# Patient Record
Sex: MALE | Race: WHITE | NOT HISPANIC OR LATINO | Employment: OTHER | URBAN - METROPOLITAN AREA
[De-identification: names, ages, dates, MRNs, and addresses within clinical notes are randomized per-mention and may not be internally consistent; named-entity substitution may affect disease eponyms.]

---

## 2020-02-14 ENCOUNTER — APPOINTMENT (OUTPATIENT)
Dept: RADIOLOGY | Facility: MEDICAL CENTER | Age: 70
End: 2020-02-14
Attending: INTERNAL MEDICINE
Payer: MEDICARE

## 2020-02-18 ENCOUNTER — HOSPITAL ENCOUNTER (OUTPATIENT)
Dept: RADIOLOGY | Facility: MEDICAL CENTER | Age: 70
End: 2020-02-18
Attending: INTERNAL MEDICINE
Payer: MEDICARE

## 2020-02-18 DIAGNOSIS — C20 RECTAL CANCER (HCC): ICD-10-CM

## 2020-02-18 PROCEDURE — A9576 INJ PROHANCE MULTIPACK: HCPCS | Performed by: INTERNAL MEDICINE

## 2020-02-18 PROCEDURE — 72197 MRI PELVIS W/O & W/DYE: CPT

## 2020-02-18 PROCEDURE — 700117 HCHG RX CONTRAST REV CODE 255: Performed by: INTERNAL MEDICINE

## 2020-02-18 PROCEDURE — 700111 HCHG RX REV CODE 636 W/ 250 OVERRIDE (IP): Mod: JG | Performed by: RADIOLOGY

## 2020-02-18 RX ORDER — MULTIVIT WITH MINERALS/LUTEIN
TABLET ORAL DAILY
COMMUNITY
End: 2020-12-11

## 2020-02-18 RX ORDER — LOSARTAN POTASSIUM 100 MG/1
100 TABLET ORAL EVERY EVENING
Status: ON HOLD | COMMUNITY
Start: 2020-02-09 | End: 2020-12-29

## 2020-02-18 RX ADMIN — GADOTERIDOL 20 ML: 279.3 INJECTION, SOLUTION INTRAVENOUS at 09:30

## 2020-02-18 RX ADMIN — GLUCAGON 1 MG: 1 INJECTION, POWDER, LYOPHILIZED, FOR SOLUTION INTRAMUSCULAR; INTRAVENOUS at 08:20

## 2020-02-18 NOTE — OR NURSING
"Preadmit appointment: \" Preparing for your Procedure information\" sheet reviewed with patient with verbal instructions. Patient instructed to continue prescribed medications through the day before surgery, instructed to take the following medications the day of surgery per anesthesia protocol: none.  Pt states he has not been given any instructions for prep or diet prior to procedure.  Pt has not eaten yet today because he just had an MRI, advised pt to be on clear liquids (like when he had his colonoscopy 2/4/20) until he hears from Dr. Gonsalez's office.  I informed pt I would call Dr. Gonsalez's office and have them call him with instructions.  Call placed to Dr. Gonsalez's office and spoke to Aylin procedure  for Dr. Gonsalez and informed her pt states he has not been instructed on diet or prep for tomorrow.  She said she will follow up and call patient  "

## 2020-02-19 ENCOUNTER — ANESTHESIA (OUTPATIENT)
Dept: SURGERY | Facility: MEDICAL CENTER | Age: 70
End: 2020-02-19
Payer: MEDICARE

## 2020-02-19 ENCOUNTER — ANESTHESIA EVENT (OUTPATIENT)
Dept: SURGERY | Facility: MEDICAL CENTER | Age: 70
End: 2020-02-19
Payer: MEDICARE

## 2020-02-19 ENCOUNTER — HOSPITAL ENCOUNTER (OUTPATIENT)
Facility: MEDICAL CENTER | Age: 70
End: 2020-02-19
Attending: INTERNAL MEDICINE | Admitting: INTERNAL MEDICINE
Payer: MEDICARE

## 2020-02-19 VITALS
BODY MASS INDEX: 28.44 KG/M2 | RESPIRATION RATE: 18 BRPM | TEMPERATURE: 97.3 F | WEIGHT: 192.02 LBS | OXYGEN SATURATION: 95 % | HEIGHT: 69 IN | DIASTOLIC BLOOD PRESSURE: 84 MMHG | SYSTOLIC BLOOD PRESSURE: 163 MMHG | HEART RATE: 65 BPM

## 2020-02-19 PROCEDURE — 160209 HCHG ENDO MINUTES - EA ADDL 1 MIN LEVEL 5: Performed by: INTERNAL MEDICINE

## 2020-02-19 PROCEDURE — 160035 HCHG PACU - 1ST 60 MINS PHASE I: Performed by: INTERNAL MEDICINE

## 2020-02-19 PROCEDURE — 160009 HCHG ANES TIME/MIN: Performed by: INTERNAL MEDICINE

## 2020-02-19 PROCEDURE — 160204 HCHG ENDO MINUTES - 1ST 30 MINS LEVEL 5: Performed by: INTERNAL MEDICINE

## 2020-02-19 PROCEDURE — 160046 HCHG PACU - 1ST 60 MINS PHASE II: Performed by: INTERNAL MEDICINE

## 2020-02-19 PROCEDURE — 160002 HCHG RECOVERY MINUTES (STAT): Performed by: INTERNAL MEDICINE

## 2020-02-19 PROCEDURE — 700101 HCHG RX REV CODE 250: Performed by: INTERNAL MEDICINE

## 2020-02-19 PROCEDURE — 700105 HCHG RX REV CODE 258: Performed by: INTERNAL MEDICINE

## 2020-02-19 PROCEDURE — 700111 HCHG RX REV CODE 636 W/ 250 OVERRIDE (IP): Performed by: ANESTHESIOLOGY

## 2020-02-19 PROCEDURE — 700101 HCHG RX REV CODE 250: Performed by: ANESTHESIOLOGY

## 2020-02-19 PROCEDURE — 160048 HCHG OR STATISTICAL LEVEL 1-5: Performed by: INTERNAL MEDICINE

## 2020-02-19 PROCEDURE — 160025 RECOVERY II MINUTES (STATS): Performed by: INTERNAL MEDICINE

## 2020-02-19 RX ORDER — HYDROMORPHONE HYDROCHLORIDE 1 MG/ML
0.1 INJECTION, SOLUTION INTRAMUSCULAR; INTRAVENOUS; SUBCUTANEOUS
Status: DISCONTINUED | OUTPATIENT
Start: 2020-02-19 | End: 2020-02-19 | Stop reason: HOSPADM

## 2020-02-19 RX ORDER — MEPERIDINE HYDROCHLORIDE 25 MG/ML
12.5 INJECTION INTRAMUSCULAR; INTRAVENOUS; SUBCUTANEOUS
Status: DISCONTINUED | OUTPATIENT
Start: 2020-02-19 | End: 2020-02-19 | Stop reason: HOSPADM

## 2020-02-19 RX ORDER — METOPROLOL TARTRATE 1 MG/ML
INJECTION, SOLUTION INTRAVENOUS PRN
Status: DISCONTINUED | OUTPATIENT
Start: 2020-02-19 | End: 2020-02-19 | Stop reason: SURG

## 2020-02-19 RX ORDER — MIDAZOLAM HYDROCHLORIDE 1 MG/ML
1 INJECTION INTRAMUSCULAR; INTRAVENOUS
Status: DISCONTINUED | OUTPATIENT
Start: 2020-02-19 | End: 2020-02-19 | Stop reason: HOSPADM

## 2020-02-19 RX ORDER — HYDROMORPHONE HYDROCHLORIDE 1 MG/ML
0.4 INJECTION, SOLUTION INTRAMUSCULAR; INTRAVENOUS; SUBCUTANEOUS
Status: DISCONTINUED | OUTPATIENT
Start: 2020-02-19 | End: 2020-02-19 | Stop reason: HOSPADM

## 2020-02-19 RX ORDER — ONDANSETRON 2 MG/ML
4 INJECTION INTRAMUSCULAR; INTRAVENOUS
Status: DISCONTINUED | OUTPATIENT
Start: 2020-02-19 | End: 2020-02-19 | Stop reason: HOSPADM

## 2020-02-19 RX ORDER — SODIUM CHLORIDE, SODIUM LACTATE, POTASSIUM CHLORIDE, CALCIUM CHLORIDE 600; 310; 30; 20 MG/100ML; MG/100ML; MG/100ML; MG/100ML
INJECTION, SOLUTION INTRAVENOUS CONTINUOUS
Status: DISCONTINUED | OUTPATIENT
Start: 2020-02-19 | End: 2020-02-19 | Stop reason: HOSPADM

## 2020-02-19 RX ORDER — OXYCODONE HCL 5 MG/5 ML
10 SOLUTION, ORAL ORAL
Status: DISCONTINUED | OUTPATIENT
Start: 2020-02-19 | End: 2020-02-19 | Stop reason: HOSPADM

## 2020-02-19 RX ORDER — HYDRALAZINE HYDROCHLORIDE 20 MG/ML
5 INJECTION INTRAMUSCULAR; INTRAVENOUS
Status: DISCONTINUED | OUTPATIENT
Start: 2020-02-19 | End: 2020-02-19 | Stop reason: HOSPADM

## 2020-02-19 RX ORDER — HYDROMORPHONE HYDROCHLORIDE 1 MG/ML
0.2 INJECTION, SOLUTION INTRAMUSCULAR; INTRAVENOUS; SUBCUTANEOUS
Status: DISCONTINUED | OUTPATIENT
Start: 2020-02-19 | End: 2020-02-19 | Stop reason: HOSPADM

## 2020-02-19 RX ORDER — DIPHENHYDRAMINE HYDROCHLORIDE 50 MG/ML
12.5 INJECTION INTRAMUSCULAR; INTRAVENOUS
Status: DISCONTINUED | OUTPATIENT
Start: 2020-02-19 | End: 2020-02-19 | Stop reason: HOSPADM

## 2020-02-19 RX ORDER — OXYCODONE HCL 5 MG/5 ML
5 SOLUTION, ORAL ORAL
Status: DISCONTINUED | OUTPATIENT
Start: 2020-02-19 | End: 2020-02-19 | Stop reason: HOSPADM

## 2020-02-19 RX ORDER — METOPROLOL TARTRATE 1 MG/ML
1 INJECTION, SOLUTION INTRAVENOUS
Status: DISCONTINUED | OUTPATIENT
Start: 2020-02-19 | End: 2020-02-19 | Stop reason: HOSPADM

## 2020-02-19 RX ORDER — HALOPERIDOL 5 MG/ML
1 INJECTION INTRAMUSCULAR
Status: DISCONTINUED | OUTPATIENT
Start: 2020-02-19 | End: 2020-02-19 | Stop reason: HOSPADM

## 2020-02-19 RX ADMIN — SODIUM CHLORIDE, POTASSIUM CHLORIDE, SODIUM LACTATE AND CALCIUM CHLORIDE 1000 ML: 600; 310; 30; 20 INJECTION, SOLUTION INTRAVENOUS at 14:28

## 2020-02-19 RX ADMIN — PROPOFOL 192 MCG/KG/MIN: 10 INJECTION, EMULSION INTRAVENOUS at 16:07

## 2020-02-19 RX ADMIN — METOPROLOL TARTRATE 2 MG: 5 INJECTION INTRAVENOUS at 16:12

## 2020-02-19 RX ADMIN — FENTANYL CITRATE 100 MCG: 50 INJECTION, SOLUTION INTRAMUSCULAR; INTRAVENOUS at 16:07

## 2020-02-19 RX ADMIN — METOPROLOL TARTRATE 1 MG: 5 INJECTION INTRAVENOUS at 16:25

## 2020-02-19 RX ADMIN — LIDOCAINE HYDROCHLORIDE 0.5 ML: 10 INJECTION, SOLUTION INFILTRATION; PERINEURAL at 14:28

## 2020-02-19 RX ADMIN — SODIUM CHLORIDE, POTASSIUM CHLORIDE, SODIUM LACTATE AND CALCIUM CHLORIDE: 600; 310; 30; 20 INJECTION, SOLUTION INTRAVENOUS at 15:19

## 2020-02-19 RX ADMIN — PROPOFOL 50 MG: 10 INJECTION, EMULSION INTRAVENOUS at 16:07

## 2020-02-19 RX ADMIN — MIDAZOLAM HYDROCHLORIDE 2 MG: 1 INJECTION, SOLUTION INTRAMUSCULAR; INTRAVENOUS at 16:07

## 2020-02-19 ASSESSMENT — PAIN SCALES - GENERAL: PAIN_LEVEL: 1

## 2020-02-19 NOTE — ANESTHESIA PREPROCEDURE EVALUATION
Relevant Problems   No relevant active problems       Physical Exam    Airway   Mallampati: II  TM distance: >3 FB  Neck ROM: full       Cardiovascular - normal exam  Rhythm: regular  Rate: normal  (-) murmur     Dental - normal exam         Pulmonary - normal exam  Breath sounds clear to auscultation     Abdominal    Neurological - normal exam                 Anesthesia Plan    ASA 2       Plan - general       Airway plan will be natural airway        Induction: intravenous    Postoperative Plan: Postoperative administration of opioids is intended.    Pertinent diagnostic labs and testing reviewed    Informed Consent:    Anesthetic plan and risks discussed with patient.

## 2020-02-19 NOTE — ANESTHESIA TIME REPORT
Anesthesia Start and Stop Event Times     Date Time Event    2/19/2020 1554 Ready for Procedure        Responsible Staff    No responsible staff documented.       Preop Diagnosis (Free Text):  Pre-op Diagnosis     MALIGNANT NEOPLASM OF RECTUM        Preop Diagnosis (Codes):    Post op Diagnosis  Malignant neoplasm of rectum      Premium Reason  Non-Premium    Comments:

## 2020-02-20 ENCOUNTER — APPOINTMENT (OUTPATIENT)
Dept: RADIOLOGY | Facility: MEDICAL CENTER | Age: 70
End: 2020-02-20
Attending: INTERNAL MEDICINE
Payer: MEDICARE

## 2020-02-20 NOTE — OP REPORT
Date of Procedure:  2/19/20   Referring Physicians:   1. Dr.sarmad ROMERO   2. Lulu Astudillo M.D.    Indication:   Rectal mass   ====================================  Procedure performed:  1.Flexible sigmoidoscopy with radial EUS evaluation    ====================================  Anesthesiologist: Giuseppe Paige M.D.  Surgeon: Carlyle Gonsalez M.D.  -----------------------------------------------------------------  Preoperative diagnosis:   1. Rectal mass     Postoperative diagnosis:  1. 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  Propofol sedation was administered under discretion of anesthesiology.  Please refer to their notes for more details.    Universal Precautions:  [place] Precautions were followed throughout the entire procedure.  The patient was continuously monitored on Blood pressure, pulse oximetry, and heart rate monitoring though out the procedure and afterwards.        A time out was taken prior to the start of the procedure to identify the patient's name, date of birth, ID badge, and appropriate procedure site/procedure type.    Informed Consent:  Informed consent was obtained from the patient after the risks, benefits, and indications were explained  to the patient in detail.  The alternatives to the procedure were explained as well.  Risks such as bleeding, infection, perforation, complications with sedation, and need for open surgery for complications were explained in detail prior to obtaining consent.  The patient was given opportunity to ask questions.  If need be these same risks, benefits, indication, and complications were explained to the patients family member prior to obtaining consent for the procedure.    Description of procedure:    Patient was brought to endoscopy operating room and met by staff and physicians.  After procedure was confirmed and appropriate procedure was identified preparations were made for sedation.  A time out was taken to identify the patients name, procedure site/type, and ID badge.  The patient was then sedated, and placed in the left lateral position.  When the patient was appropriately sedated the GIF Olympus colonoscope was used to perform the procedure.    The colonoscope was passed under direct visualization to the level of the cecum.  The cecum was identified by the ileocecal valve and the appendiceal orifice.  Photographs were obtained.        PREP QUALITY: Good     After examination of the rectum  the colonoscope was placed iat 30 cm from the anal verge ,and carefully withdrawn with attention directed to all folds, haustra, and mucosa.    FINDINGS:     The rectum was then evaluated in forward and retroflexion.      Rectum:  A carpeted polypoid lesion with a central ulceration extending from the 11- 16 cm from the anal verge                   The lesion involved 1/3rd of the circumference and is no obstructive      --The mucosa and evaluation was otherwise unremarkable aside from above.      After attempted retroflexion in the rectum, the insufflated air was remove from the colon.  The colonoscope was removed.  The Olympus colonoscope was taken for processing and cleaning    Patient remained in left lateral position  under sedation.  The Olympus UCT- radial echoendoscope was attached to the processor and prepped for endoscopic evaluation of the lesion.    ====Flexible sigmoidoscopy with Radial Endoscopic Ultrasound=======    -The Echoendoscope was advanced as proximal as possible up to 25-30cm max, and then carefully withdrawn with continuous ultrasound imaging.    Images were obtained beginning at the iliac vessels and then through to the anal sphincter.  He region of the lesion was carefully examined in order to   obtain definitve T-staging.  Additional attention was paid to the surrounding regions to evaluate for lymph nodes.    -Findings:     EUS revealed No enlarged perirectal Lymph nodes      EUS revealed Tstage to be T3      The tumor breaks in to perirectal fat         There was No  involvement of the anal sphincters or prostate

## 2020-02-20 NOTE — DISCHARGE INSTRUCTIONS
ENDOSCOPY HOME CARE INSTRUCTIONS      COLONOSCOPY OR FLEXIBLE SIGMOIDOSCOPY  1. If you received a barium enema, take a mild laxative such as dulcolax, Sushila's M.O., or Milk of Magnesia to clean out the barium.  2. Drink plenty of fluids. Eat a diet high in fiber (whole-grain breads, fresh fruit and vegetables).  3. You may notice a few drops of blood with your first bowel movement. If you develop any large amount of bleeding, black stools, a fever, or abdominal pain, call your doctor right away.  4. Don't drive or drink alcohol for 24 hours. The medication you received will make you too drowsy.  5. Additional instructions: Follow with Oncology for neoadjuvant chemoradiation     Dr. 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  If any questions arise, call your doctor. If your doctor is not available, please feel free to call [phone]. You can also call the HEALTH HOTLINE open 24 hours/day, 7 days/week and speak to a nurse at [phone], or toll free [phone].    Depression / Suicide Risk    As you are discharged from this [facility] facility, it is important to learn how to keep safe from harming yourself.    Recognize the warning signs:  · Abrupt changes in personality, positive or negative- including increase in energy   · Giving away possessions  · Change in eating patterns- significant weight changes-  positive or negative  · Change in sleeping  patterns- unable to sleep or sleeping all the time   · Unwillingness or inability to communicate  · Depression  · Unusual sadness, discouragement and loneliness  · Talk of wanting to die  · Neglect of personal appearance   · Rebelliousness- reckless behavior  · Withdrawal from people/activities they love  · Confusion- inability to concentrate     If you or a loved one observes any of these behaviors or has concerns about self-harm, here's what you can do:  · Talk about it- your feelings and reasons for harming yourself  · Remove any means that you might use to hurt yourself (examples: pills, rope, extension cords, firearm)  · Get professional help from the community (Mental Health, Substance Abuse, psychological counseling)  · Do not be alone:Call your Safe Contact- someone whom you trust who will be there for you.  · Call your local CRISIS HOTLINE 963-8310 or 999-680-8563  · Call your local Children's Mobile Crisis Response Team [place] (231) 970-3311 or www.[employer]  · Call the toll free National Suicide Prevention Hotlines   · National Suicide Prevention Lifeline 986-412-UNKC (1552)  · National Hope Line Network 800-SUICIDE (927-6294)    I acknowledge receipt and understanding of these Home Care Instructions.

## 2020-02-20 NOTE — ANESTHESIA QCDR
2019 [facility] Clinical Data Registry (for Quality Improvement)     Postoperative nausea/vomiting risk protocol (Adult = 18 yrs and Pediatric 3-17 yrs)- (430 and 463)  General inhalation anesthetic (NOT TIVA) with PONV risk factors: No  Provision of anti-emetic therapy with at least 2 different classes of agents: N/A  Patient DID NOT receive anti-emetic therapy and reason is documented in Medical Record: N/A    Multimodal Pain Management- (477)  Non-emergent surgery AND patient age >= 18: Yes  Use of Multimodal Pain Management, two or more drugs and/or interventions, NOT including systemic opioids: No  Exception: Documented allergy to multiple classes of analgesics: No       Smoking Abstinence (404)  Patient is current smoker (cigarette, pipe, e-cig, marijuanna): No  Elective Surgery:   Abstinence instructions provided prior to day of surgery:   Patient abstained from smoking on day of surgery:     Pre-Op Beta-Blocker in Isolated CABG (44)  Isolated CABG AND patient age >= 18: No  Beta-blocker admin within 24 hours of surgical incision:   Exception:of medical reason(s) for not administering beta blocker within 24 hours prior to surgical incision (e.g., not  indicated,other medical reason):     PACU assessment of acute postoperative pain prior to Anesthesia Care End- Applies to Patients Age = 18- (ABG7)  Initial PACU pain score is which of the following: < 7/10  Patient unable to report pain score: N/A    Post-anesthetic transfer of care checklist/protocol to PACU/ICU- (426 and 427)  Upon conclusion of case, patient transferred to which of the following locations: PACU/Non-ICU  Use of transfer checklist/protocol: Yes  Exclusion: Service Performed in Patient Hospital Room (and thus did not require transfer): N/A  Unplanned admission to ICU related to anesthesia service up through end of PACU care- (MD51)  Unplanned admission to ICU (not initially anticipated at anesthesia start time): No

## 2020-02-20 NOTE — OR NURSING
1637: Pt arrived to PACU s/p colonoscopy performed by Dr. Gonsalez in left lateral position in stretcher. Nasal airway in oral cavity. Pt aroused to verbal stimulation. Nasal airway removed and 3L NC in place. Sinus arrhythmia with occasional PVCs.  Dr. Paige aware of rhythm. No action requested at this time. Abdomen soft, non-tender. Pt oriented to self. Denies pain or nausea.     1647: Pt transferred to supine position independently. HOB increased to 30 degrees. Pt AOx4. Room air. Continues to deny pain or nausea. Pt declines sips of water at this time.     1655: Pt's wife Kaleb called and updated on pt's status. Pt resting comfortably in stretcher at this time.   1701: Pt tolerating cranberry juice without nausea.   1710: Pt denies pain or nausea. Abdomen remains soft. Tolerating PO. Pt meets phase I d/c criteria.   1715: Gave report to DEYSI Troy  1718: Dr. Gonsalez at bedside explaining procedure to pt.

## 2020-02-20 NOTE — ANESTHESIA POSTPROCEDURE EVALUATION
Patient: Casey Arroyo    Procedure Summary     Date:  02/19/20 Room / Location:   ENDOSCOPIC ULTRASOUND ROOM / SURGERY [facility]    Anesthesia Start:  1604 Anesthesia Stop:  1639    Procedures:       COLONOSCOPY      COLONOSCOPY, FLEXIBLE, WITH ENDOSCOPIC US-LOWER RADIAL Diagnosis:       Malignant neoplasm of rectum      (MALIGNANT NEOPLASM OF RECTUM)    Surgeon:  Carlyle Gonsalez M.D. Responsible Provider:  Giuseppe Paige M.D.    Anesthesia Type:  general ASA Status:  2          Final Anesthesia Type: general  Last vitals  BP   Blood Pressure : 154/78    Temp   36.4 °C (97.5 °F)    Pulse   Pulse: 65   Resp   18    SpO2   96 %      Anesthesia Post Evaluation    Patient location during evaluation: PACU  Patient participation: complete - patient participated  Level of consciousness: awake and alert  Pain score: 1    Airway patency: patent  Anesthetic complications: no  Cardiovascular status: hemodynamically stable  Respiratory status: acceptable  Hydration status: euvolemic    PONV: none           Nurse Pain Score: 0 (NPRS)

## 2020-09-16 ENCOUNTER — HOSPITAL ENCOUNTER (OUTPATIENT)
Dept: RADIOLOGY | Facility: MEDICAL CENTER | Age: 70
End: 2020-09-16
Attending: SURGERY
Payer: MEDICARE

## 2020-09-16 DIAGNOSIS — C20 RECTAL CANCER (HCC): ICD-10-CM

## 2020-09-16 PROCEDURE — A9576 INJ PROHANCE MULTIPACK: HCPCS | Performed by: SURGERY

## 2020-09-16 PROCEDURE — 72197 MRI PELVIS W/O & W/DYE: CPT

## 2020-09-16 PROCEDURE — 700111 HCHG RX REV CODE 636 W/ 250 OVERRIDE (IP): Mod: JG | Performed by: RADIOLOGY

## 2020-09-16 PROCEDURE — 700117 HCHG RX CONTRAST REV CODE 255: Performed by: SURGERY

## 2020-09-16 RX ORDER — HEPARIN SODIUM (PORCINE) LOCK FLUSH IV SOLN 100 UNIT/ML 100 UNIT/ML
300-500 SOLUTION INTRAVENOUS PRN
Status: DISCONTINUED | OUTPATIENT
Start: 2020-09-16 | End: 2020-09-17 | Stop reason: HOSPADM

## 2020-09-16 RX ADMIN — HEPARIN 500 UNITS: 100 SYRINGE at 16:00

## 2020-09-16 RX ADMIN — GADOTERIDOL 20 ML: 279.3 INJECTION, SOLUTION INTRAVENOUS at 16:43

## 2020-09-16 RX ADMIN — GLUCAGON 1 MG: 1 INJECTION, POWDER, LYOPHILIZED, FOR SOLUTION INTRAMUSCULAR; INTRAVENOUS at 14:50

## 2020-09-16 NOTE — PROGRESS NOTES
Port located right chest. Site clean, dry, non-tender. Port accessed using sterile technique, flushes easily with good blood return. After MRI, port flushed with 20 ml N.S. and heparin and de-accessed. No bleeding noted, band-aid applied.

## 2020-10-19 ENCOUNTER — APPOINTMENT (OUTPATIENT)
Dept: WOUND CARE | Facility: MEDICAL CENTER | Age: 70
End: 2020-10-19
Attending: SURGERY
Payer: MEDICARE

## 2020-10-20 ENCOUNTER — NON-PROVIDER VISIT (OUTPATIENT)
Dept: WOUND CARE | Facility: MEDICAL CENTER | Age: 70
End: 2020-10-20
Attending: SURGERY
Payer: MEDICARE

## 2020-10-20 PROCEDURE — 99211 OFF/OP EST MAY X REQ PHY/QHP: CPT

## 2020-10-20 NOTE — CERTIFICATION
"[facility]  [address], [address]  [place]  [address]  [phone] Fax#    Pre-surgical Ostomy Marking    Patient Name:  Casey Arroyo  Date of Marking:10/20/20    Date of Surgery:10/21/20  Surgeon:Dr. Aquino  Type of Surgery: Robotic assisted LAR with diverting ileostomy  Diagnosis:Rectal ca      SUBJECTIVE: \"The surgeon said I can get this reversed in about a month\"  HPI:Pt is a 69 year old gentleman with a hx of rectal ca, underwent chemo and radiation, referred by surgeon for pre-surgical stoma marking, undergoing LAR with diverting ileostomy at [facility] on 10/21/2020.       Medications: (.med)     Allergies:  Patient has no known allergies.      OBJECTIVE   Assist patient to identify potential stoma site within his/her line of site on a flat pouching surface, within the rectus muscle, away from belt line, bony prominences, existing scars and umbilicus.        Patient teaching:    __x__ Reviewed basic anatomy and function of the GI and/or  tract  __x__ Reviewed nature of surgical procedure.  __x__ Patient to view Ostomy videos on YouTube at home.  __x__ Pouching system demonstrated.      Reviewed/Provided patient with literature pertaining to:    ____ Colostomy   __x__ Ileostomy   ____ Urostomy   ____ Other__________________________________________________________.    Check marks indicated completion of the following:  __x__ Procedure explained to patient.  __x__ Rectus muscle identified with patient in supine position.  __x__ Appropriate abdominal quadrant for planned surgical procedure identified.  __x__ Existing scars identified.  ___x_ Potential sites evaluated with patient:  __x__ Sitting.  __x__ Bending forward/twisting at waist.  __x__ Site(s) selected with respect to skin folds, creases, abdominal contours & belt line.     Special considerations:   ____ Wheel chair bound  ____ Child  ____ Continent procedure  ____ Patient with multiple stomas  __x__ " "Ambulatory    Potential site (s) marked with an \"X\":   Skin prepped with skin protectant wipe.   Catarino applied with indelible marker.    Covered with a Tegaderm and picture framed with tape.            Abdomen Quadrant Marked:  _x___ URQ  ____ ULQ    ____ LRQ  ____ LLQ    Other: ___________________________________________________________________    ___________________________________________________________________      [facility] Team notified (ext. [phone]) :   N/A - surgery is at [facility]______       ASSESSMENT/PLAN  -Inpatient ostomy nurses to see patient while hospitalized.   -Patient to return to [facility] to see ostomy RN post-discharge        Clinician Signature:  _________________________________ Date:  __________    Physician Signature:  ________________________________ Date:  __________  "

## 2020-10-21 NOTE — PATIENT INSTRUCTIONS
Ostomy marking appointment today.   Change urostomies and ileostomies every 3-5 days. Change colostomies every 5-7 days. Change appliance immediately if it is leaking or peristomal skin feels irritated, has itching, or  burning.   To change the appliance, remove previous appliance, cleanse peristomal skin with warm water/washcloth, pat dry, make an ostomy template or use cardboard measuring guide and trace ostomy shape onto back of barrier, cut out barrier, apply a paste ring around barrier opening and apply appliance. Empty pouches when no more than ½ full. Check contents every 2 hours or as needed. Do not leave soap residue on tissue and do not use baby wipes or skin prep wipes.     Should you experience any significant changes in your condition, such as infection (redness, swelling, localized heat, increased pain, fever > 101 F, chills) or have any questions regarding your home care instructions, please contact the wound center at [phone]. If after hours, contact your primary care physician or go to the hospital emergency room.

## 2020-11-03 ENCOUNTER — NON-PROVIDER VISIT (OUTPATIENT)
Dept: WOUND CARE | Facility: MEDICAL CENTER | Age: 70
End: 2020-11-03
Attending: SURGERY
Payer: MEDICARE

## 2020-11-03 PROCEDURE — 99211 OFF/OP EST MAY X REQ PHY/QHP: CPT

## 2020-11-04 NOTE — PATIENT INSTRUCTIONS
Change ileostomies every 3-5 days.  Change appliance immediately if it is leaking or peristomal skin feels irritated, has itching, or  burning.   To change the appliance, remove previous appliance, cleanse peristomal skin with warm water/washcloth, pat dry, make an ostomy template or use cardboard measuring guide and trace ostomy shape onto back of barrier, cut out barrier, apply a paste ring around barrier opening and apply appliance. Empty pouches when no more than ½ full. Check contents every 2 hours or as needed. Do not leave soap residue on tissue and do not use baby wipes or skin prep wipes.     Should you experience any significant changes in your condition, such as infection (redness, swelling, localized heat, increased pain, fever > 101 F, chills) or have any questions regarding your home care instructions, please contact the wound center at [phone]. If after hours, contact your primary care physician or go to the hospital emergency room.

## 2020-11-10 ENCOUNTER — NON-PROVIDER VISIT (OUTPATIENT)
Dept: WOUND CARE | Facility: MEDICAL CENTER | Age: 70
End: 2020-11-10
Attending: SURGERY
Payer: MEDICARE

## 2020-11-10 PROCEDURE — 99211 OFF/OP EST MAY X REQ PHY/QHP: CPT

## 2020-11-11 NOTE — PATIENT INSTRUCTIONS
Change urostomies and ileostomies every 3-5 days. Change colostomies every 5-7 days. Change appliance immediately if it is leaking or peristomal skin feels irritated, has itching, or  burning.   To change the appliance, remove previous appliance, cleanse peristomal skin with warm water/washcloth, pat dry, make an ostomy template or use cardboard measuring guide and trace ostomy shape onto back of barrier, cut out barrier, apply a paste ring around barrier opening and apply appliance. Empty pouches when no more than ½ full. Check contents every 2 hours or as needed. Do not leave soap residue on tissue and do not use baby wipes or skin prep wipes.     Should you experience any significant changes in your condition, such as infection (redness, swelling, localized heat, increased pain, fever > 101 F, chills) or have any questions regarding your home care instructions, please contact the wound center at [phone]. If after hours, contact your primary care physician or go to the hospital emergency room.

## 2020-11-11 NOTE — NON-PROVIDER
"Advanced Wound Care  [facility]  [address]  [place] [address]  [phone] Fax#    Ostomy Evaluation  For 90 Day Certification Period:  11/03/2020 - 02/03/2021    Referring Provider:  Dr. Miles Peña MD  Primary Provider: Isidro Maldonado MD  Consulting Providers:  Surgeon: Dr. Miles Peña MD      Start of Care: 11/03/2020  Reason for referral: Post surgical f/u for teaching, support, supply/product selection      SUBJECTIVE:  \"We changed it over the weekend together\".    HPI: Pt is a 69 year old gentleman with a hx of rectal ca, underwent chemo and radiation, referred by surgeon s/p LAR with diverting ileostomy at [facility] on 10/21/2020.     Past Medical Hx:  Past Medical History:   Diagnosis Date   • Cancer (HCC) 02/04/2020    rectal cancer   • Hypertension      Surgical Hx:   Past Surgical History:   Procedure Laterality Date   • PB COLONOSCOPY,DIAGNOSTIC  2/19/2020    Procedure: COLONOSCOPY;  Surgeon: Carlyle Gonsalez M.D.;  Location: SURGERY [facility];  Service: Gastroenterology   • COLONOSCOPY FLEX W/ENDO US  2/19/2020    Procedure: COLONOSCOPY, FLEXIBLE, WITH ENDOSCOPIC US-LOWER RADIAL;  Surgeon: Carlyle Gonsalez M.D.;  Location: SURGERY [facility];  Service: Gastroenterology   • COLONOSCOPY  02/04/2020   • APPENDECTOMY  1966     Medications:   Current Outpatient Medications:   •  losartan (COZAAR) 100 MG Tab, Take  by mouth every evening. Take 1 tablet by mouth every day, Disp: , Rfl:   •  Ascorbic Acid (VITAMIN C) 1000 MG Tab, Take  by mouth every day., Disp: , Rfl:   Allergies: Patient has no known allergies.    Social Hx:  Social History     Socioeconomic History   • Marital status:      Spouse name: Not on file   • Number of children: Not on file   • Years of education: Not on file   • Highest education level: Not on file   Occupational History   • Not on file   Social Needs   • Financial resource strain: Not on file   • Food " "insecurity     Worry: Not on file     Inability: Not on file   • Transportation needs     Medical: Not on file     Non-medical: Not on file   Tobacco Use   • Smoking status: Never Smoker   • Smokeless tobacco: Never Used   Substance and Sexual Activity   • Alcohol use: Yes     Comment: 8 shots per day   • Drug use: Never   • Sexual activity: Not on file   Lifestyle   • Physical activity     Days per week: Not on file     Minutes per session: Not on file   • Stress: Not on file   Relationships   • Social connections     Talks on phone: Not on file     Gets together: Not on file     Attends [employer] service: Not on file     Active member of club or organization: Not on file     Attends meetings of clubs or organizations: Not on file     Relationship status: Not on file   • Intimate partner violence     Fear of current or ex partner: Not on file     Emotionally abused: Not on file     Physically abused: Not on file     Forced sexual activity: Not on file   Other Topics Concern   • Not on file   Social History Narrative   • Not on file         OBJECTIVE:     STOMA ASSESSMENT:   Type:  __x   loop__Ileostomy     ____Colostomy    ____Urostomy    ____Other     Location:  RUQ    Size:1 3/4\"    Shape:oval   Color:red, moist   Protrudes:      ___ >1 inch               __x_ <1 inch   [place]:  Patent, superior os   Mucocutaneous Junction:  separation at 6-10 o'clock   Skin indentations, creases or scar tissue:none noted   Effie-stomal Skin Problems: resolved with scar tissue    Effluent / Flatus: soft brown   Photo  __x__ Yes ____ No            Pouching Procedure:   Old appliance removed using [place] medical adhesive spray.    Peristomal skin cleansed with: warm water on a washcloth   Peristomal skin treated with: stoma powder to separation sealed with no sting skin prep   Ostomy appliance used: [place] flat 70mm CTF with a 2\" paste ring and corresponding 70mm pouch       Patient Education: Change urostomies and ileostomies " every 3-5 days. Change colostomies every 5-7 days. Change appliance immediately if it is leaking or peristomal skin feels irritated, has itching, or  burning.   To change the appliance, remove previous appliance, cleanse peristomal skin with warm water/washcloth, pat dry, make an ostomy template or use cardboard measuring guide and trace ostomy shape onto back of barrier, cut out barrier, apply a paste ring around barrier opening and apply appliance. Empty pouches when no more than ½ full. Check contents every 2 hours or as needed. Do not leave soap residue on tissue and do not use baby wipes or skin prep wipes.     Should you experience any significant changes in your condition, such as infection (redness, swelling, localized heat, increased pain, fever > 101 F, chills) or have any questions regarding your home care instructions, please contact the wound center at [phone]. If after hours, contact your primary care physician or go to the hospital emergency room.       Verbal/demonstration instructions of above pouching procedure provided to patient/family.      Response: pt and spouse with good comprehension of instr today      PLAN: see pt in 1 week, he and spouse will change appliance in the meantime.     Supplies Needed:   Appliance type:    Arsenio - as above             Other:      Accessories:         Supplier:  [employer]    Frequency:  1 x week       Professional Collaboration:  Placed order with [employer]      At the time of each visit, a thorough assessment of the patient is completed to assure appropriateness of our plan of care.  The plan of care may need to be adapted from the original plan of care to address any significant changes in patient status.    Clinician Signature:  _________________________________  Date:  ____________    Physician Signature:  ________________________________  Date:  ____________

## 2020-11-16 ENCOUNTER — NON-PROVIDER VISIT (OUTPATIENT)
Dept: WOUND CARE | Facility: MEDICAL CENTER | Age: 70
End: 2020-11-16
Attending: SURGERY
Payer: MEDICARE

## 2020-11-16 PROCEDURE — 99211 OFF/OP EST MAY X REQ PHY/QHP: CPT

## 2020-11-16 NOTE — PATIENT INSTRUCTIONS
Change urostomies and ileostomies every 3-5 days. Change colostomies every 5-7 days. Change appliance immediately if it is leaking or peristomal skin feels irritated, has itching, or  burning.   To change the appliance, remove previous appliance, cleanse peristomal skin with warm water/washcloth, pat dry, make an ostomy template or use cardboard measuring guide and trace ostomy shape onto back of barrier, cut out barrier, apply a paste ring around barrier opening and apply appliance. Empty pouches when no more than ½ full. Check contents every 2 hours or as needed. Do not leave soap residue on tissue and do not use baby wipes or skin prep wipes.     Should you experience any significant changes in your condition, such as infection (redness, swelling, localized heat, increased pain, fever > 101 F, chills) or have any questions regarding your home care instructions, please contact the wound center at [phone]. If after hours, contact your primary care physician or go to the hospital emergency room.

## 2020-11-16 NOTE — NON-PROVIDER
"Advanced Wound Care  [facility]  [address]  [place] [address]  [phone] Fax#    Ostomy Evaluation  For 90 Day Certification Period:  11/03/2020 - 02/03/2021    Referring Provider:  Dr. Miles Peña MD  Primary Provider: Isidro Maldonado MD  Consulting Providers:  Surgeon: Dr. Miles Peña MD      Start of Care: 11/03/2020  Reason for referral: Post surgical f/u for teaching, support, supply/product selection      SUBJECTIVE:  \"Its' the same one you put on\".    HPI: Pt is a 70 year old gentleman with a hx of rectal ca, underwent chemo and radiation, referred by surgeon s/p LAR with diverting ileostomy at [facility] on 10/21/2020.     Past Medical Hx:  Past Medical History:   Diagnosis Date   • Cancer (HCC) 02/04/2020    rectal cancer   • Hypertension      Surgical Hx:   Past Surgical History:   Procedure Laterality Date   • PB COLONOSCOPY,DIAGNOSTIC  2/19/2020    Procedure: COLONOSCOPY;  Surgeon: Carlyle Gonsalez M.D.;  Location: SURGERY [facility];  Service: Gastroenterology   • COLONOSCOPY FLEX W/ENDO US  2/19/2020    Procedure: COLONOSCOPY, FLEXIBLE, WITH ENDOSCOPIC US-LOWER RADIAL;  Surgeon: Carlyle Gonsalez M.D.;  Location: SURGERY [facility];  Service: Gastroenterology   • COLONOSCOPY  02/04/2020   • APPENDECTOMY  1966     Medications:   Current Outpatient Medications:   •  losartan (COZAAR) 100 MG Tab, Take  by mouth every evening. Take 1 tablet by mouth every day, Disp: , Rfl:   •  Ascorbic Acid (VITAMIN C) 1000 MG Tab, Take  by mouth every day., Disp: , Rfl:   Allergies: Patient has no known allergies.    Social Hx:  Social History     Socioeconomic History   • Marital status:      Spouse name: Not on file   • Number of children: Not on file   • Years of education: Not on file   • Highest education level: Not on file   Occupational History   • Not on file   Social Needs   • Financial resource strain: Not on file   • Food insecurity     " "Worry: Not on file     Inability: Not on file   • Transportation needs     Medical: Not on file     Non-medical: Not on file   Tobacco Use   • Smoking status: Never Smoker   • Smokeless tobacco: Never Used   Substance and Sexual Activity   • Alcohol use: Yes     Comment: 8 shots per day   • Drug use: Never   • Sexual activity: Not on file   Lifestyle   • Physical activity     Days per week: Not on file     Minutes per session: Not on file   • Stress: Not on file   Relationships   • Social connections     Talks on phone: Not on file     Gets together: Not on file     Attends Sikhism service: Not on file     Active member of club or organization: Not on file     Attends meetings of clubs or organizations: Not on file     Relationship status: Not on file   • Intimate partner violence     Fear of current or ex partner: Not on file     Emotionally abused: Not on file     Physically abused: Not on file     Forced sexual activity: Not on file   Other Topics Concern   • Not on file   Social History Narrative   • Not on file         OBJECTIVE:     STOMA ASSESSMENT:   Type:  __x   loop__Ileostomy     ____Colostomy    ____Urostomy    ____Other     Location:  RUQ    Size:1 3/4\"    Shape:oval   Color:red, moist   Protrudes:      ___ >1 inch               __x_ <1 inch   [place]:  Patent, superior os   Mucocutaneous Junction:  separation at 6-10 o'clock   Skin indentations, creases or scar tissue:none noted   Effie-stomal Skin Problems: resolved with scar tissue; small crust post possible blister lateral & inferior   Effluent / Flatus: soft brown   Photo  __x__ Yes ____ No            Pouching Procedure:   Old appliance removed using [place] medical adhesive spray.    Peristomal skin cleansed with: warm water on a washcloth   Peristomal skin treated with: stoma powder to separation sealed with no sting skin prep   Ostomy appliance used: [place] flat 70mm CTF with a 2\" paste ring and corresponding 70mm pouch, caulked with stoma " paste      Patient Education: Change urostomies and ileostomies every 3-5 days. Change colostomies every 5-7 days. Change appliance immediately if it is leaking or peristomal skin feels irritated, has itching, or  burning.   To change the appliance, remove previous appliance, cleanse peristomal skin with warm water/washcloth, pat dry, make an ostomy template or use cardboard measuring guide and trace ostomy shape onto back of barrier, cut out barrier, apply a paste ring around barrier opening and apply appliance. Empty pouches when no more than ½ full. Check contents every 2 hours or as needed. Do not leave soap residue on tissue and do not use baby wipes or skin prep wipes.     Should you experience any significant changes in your condition, such as infection (redness, swelling, localized heat, increased pain, fever > 101 F, chills) or have any questions regarding your home care instructions, please contact the wound center at [phone]. If after hours, contact your primary care physician or go to the hospital emergency room.       Verbal/demonstration instructions of above pouching procedure provided to patient/family.      Response: pt and spouse with good comprehension of instr today      PLAN: see pt in 1 week, he and spouse will change appliance in the meantime.     Supplies Needed:   Appliance type:    Arsenio - as above             Other:      Accessories:         Supplier:  [employer]    Frequency:  1 x week       Professional Collaboration:  order with [employer] received      At the time of each visit, a thorough assessment of the patient is completed to assure appropriateness of our plan of care.  The plan of care may need to be adapted from the original plan of care to address any significant changes in patient status.    Clinician Signature:  _________________________________  Date:  ____________    Physician Signature:  ________________________________  Date:  ____________

## 2020-11-24 ENCOUNTER — NON-PROVIDER VISIT (OUTPATIENT)
Dept: WOUND CARE | Facility: MEDICAL CENTER | Age: 70
End: 2020-11-24
Attending: SURGERY
Payer: MEDICARE

## 2020-11-24 PROCEDURE — 99211 OFF/OP EST MAY X REQ PHY/QHP: CPT

## 2020-11-24 NOTE — NON-PROVIDER
"Advanced Wound Care  [facility]  [address]  [place] [address]  [phone] Fax#    Ostomy Evaluation  For 90 Day Certification Period:  11/03/2020 - 02/03/2021    Referring Provider:  Dr. Milse Peña MD  Primary Provider: Isidro Maldonado MD  Consulting Providers:  Surgeon: Dr. Miles Peña MD      Start of Care: 11/03/2020  Reason for referral: Post surgical f/u for teaching, support, supply/product selection      SUBJECTIVE:  \"We put it on on Saturday and it hasn't leaked (Day 4)\".    HPI: Pt is a 70 year old gentleman with a hx of rectal ca, underwent chemo and radiation, referred by surgeon s/p LAR with diverting ileostomy at [facility] on 10/21/2020.     Past Medical Hx:  Past Medical History:   Diagnosis Date   • Cancer (HCC) 02/04/2020    rectal cancer   • Hypertension      Surgical Hx:   Past Surgical History:   Procedure Laterality Date   • PB COLONOSCOPY,DIAGNOSTIC  2/19/2020    Procedure: COLONOSCOPY;  Surgeon: Carlyle Gonsalez M.D.;  Location: SURGERY [facility];  Service: Gastroenterology   • COLONOSCOPY FLEX W/ENDO US  2/19/2020    Procedure: COLONOSCOPY, FLEXIBLE, WITH ENDOSCOPIC US-LOWER RADIAL;  Surgeon: Carlyle Gonsalez M.D.;  Location: SURGERY [facility];  Service: Gastroenterology   • COLONOSCOPY  02/04/2020   • APPENDECTOMY  1966     Medications:   Current Outpatient Medications:   •  losartan (COZAAR) 100 MG Tab, Take  by mouth every evening. Take 1 tablet by mouth every day, Disp: , Rfl:   •  Ascorbic Acid (VITAMIN C) 1000 MG Tab, Take  by mouth every day., Disp: , Rfl:   Allergies: Patient has no known allergies.    Social Hx:  Social History     Socioeconomic History   • Marital status:      Spouse name: Not on file   • Number of children: Not on file   • Years of education: Not on file   • Highest education level: Not on file   Occupational History   • Not on file   Social Needs   • Financial resource strain: Not on file   • " "Food insecurity     Worry: Not on file     Inability: Not on file   • Transportation needs     Medical: Not on file     Non-medical: Not on file   Tobacco Use   • Smoking status: Never Smoker   • Smokeless tobacco: Never Used   Substance and Sexual Activity   • Alcohol use: Yes     Comment: 8 shots per day   • Drug use: Never   • Sexual activity: Not on file   Lifestyle   • Physical activity     Days per week: Not on file     Minutes per session: Not on file   • Stress: Not on file   Relationships   • Social connections     Talks on phone: Not on file     Gets together: Not on file     Attends [employer] service: Not on file     Active member of club or organization: Not on file     Attends meetings of clubs or organizations: Not on file     Relationship status: Not on file   • Intimate partner violence     Fear of current or ex partner: Not on file     Emotionally abused: Not on file     Physically abused: Not on file     Forced sexual activity: Not on file   Other Topics Concern   • Not on file   Social History Narrative   • Not on file         OBJECTIVE:     STOMA ASSESSMENT:   Type:  __x   loop__Ileostomy     ____Colostomy    ____Urostomy    ____Other     Location:  RUQ    Size:1 3/4\"    Shape:oval   Color:red, moist   Protrudes:      ___ >1 inch               __x_ <1 inch   [place]:  Patent, superior os   Mucocutaneous Junction:  separation at 6-10 o'clock has granulated in to surface   Skin indentations, creases or scar tissue: none noted   Effie-stomal Skin Problems: new small intact blister just superior to naval, and dermatitis to outer areas, apparently contact dermatitis from tape border on ostomy flange.   Effluent / Flatus: soft brown   Photo  __x__ Yes ____ No                Pouching Procedure:   Old appliance removed using [place] medical adhesive spray.    Peristomal skin cleansed with: warm water on a washcloth   Peristomal skin treated with: stoma powder/ no sting skin prep crusting to MCJ open areas. " "Brava strips to dermatitis/blistered areas before placing flange   Ostomy appliance used: [place] flat 70mm CTF with a 2\" paste ring and corresponding 70mm pouch      Patient Education: Change urostomies and ileostomies every 3-5 days. Change colostomies every 5-7 days. Change appliance immediately if it is leaking or peristomal skin feels irritated, has itching, or  burning.   To change the appliance, remove previous appliance, cleanse peristomal skin with warm water/washcloth, pat dry, make an ostomy template or use cardboard measuring guide and trace ostomy shape onto back of barrier, cut out barrier, apply a paste ring around barrier opening and apply appliance. Empty pouches when no more than ½ full. Check contents every 2 hours or as needed. Do not leave soap residue on tissue and do not use baby wipes or skin prep wipes.     Should you experience any significant changes in your condition, such as infection (redness, swelling, localized heat, increased pain, fever > 101 F, chills) or have any questions regarding your home care instructions, please contact the wound center at [phone]. If after hours, contact your primary care physician or go to the hospital emergency room.       Verbal/demonstration instructions of above pouching procedure provided to patient/family.      Response: pt and spouse with good comprehension of instr today      PLAN: see pt in 1 week, he and spouse will change appliance in the meantime.     Supplies Needed:   Appliance type:    [place] - as above             Other:      Accessories:         Supplier:  [employer]    Frequency:  1 x week       Professional Collaboration:  None. Pt had questions about COVID-19 vaccine - I recommended he make an appointment with his PCP for this      At the time of each visit, a thorough assessment of the patient is completed to assure appropriateness of our plan of care.  The plan of care may need to be adapted from the original plan of care to " address any significant changes in patient status.    Clinician Signature:  _________________________________  Date:  ____________    Physician Signature:  ________________________________  Date:  ____________

## 2020-11-24 NOTE — PATIENT INSTRUCTIONS
Change urostomies and ileostomies every 3-5 days. Change colostomies every 5-7 days. Change appliance immediately if it is leaking or peristomal skin feels irritated, has itching, or  burning.   To change the appliance, remove previous appliance, cleanse peristomal skin with warm water/washcloth, pat dry, make an ostomy template or use cardboard measuring guide and trace ostomy shape onto back of barrier, cut out barrier, apply a paste ring around barrier opening and apply appliance. Empty pouches when no more than ½ full. Check contents every 2 hours or as needed. Do not leave soap residue on tissue and do not use baby wipes or skin prep wipes.     Should you experience any significant changes in your condition, such as infection (redness, swelling, localized heat, increased pain, fever > 101 F, chills) or have any questions regarding your home care instructions, please contact the wound center at [phone]. If after hours, contact your primary care physician or go to the hospital emergency room.

## 2020-12-01 ENCOUNTER — NON-PROVIDER VISIT (OUTPATIENT)
Dept: WOUND CARE | Facility: MEDICAL CENTER | Age: 70
End: 2020-12-01
Attending: SURGERY
Payer: MEDICARE

## 2020-12-01 PROCEDURE — 99211 OFF/OP EST MAY X REQ PHY/QHP: CPT

## 2020-12-01 NOTE — PATIENT INSTRUCTIONS
Change urostomies and ileostomies every 3-5 days. Change colostomies every 5-7 days. Change appliance immediately if it is leaking or peristomal skin feels irritated, has itching, or  burning.   To change the appliance, remove previous appliance, cleanse peristomal skin with warm water/washcloth, pat dry, make an ostomy template or use cardboard measuring guide and trace ostomy shape onto back of barrier, cut out barrier, apply a paste ring around barrier opening and apply appliance. Empty pouches when no more than ½ full. Check contents every 2 hours or as needed. Do not leave soap residue on tissue and do not use baby wipes or skin prep wipes.     Should you experience any significant changes in your condition, such as infection (redness, swelling, localized heat, increased pain, fever > 101 F, chills) or have any questions regarding your home care instructions, please contact the wound center at [phone]. If after hours, contact your primary care physician or go to the hospital emergency room.

## 2020-12-01 NOTE — NON-PROVIDER
"Advanced Wound Care  [facility]  [address]  [place] [address]  [phone] Fax#    Ostomy Evaluation  For 90 Day Certification Period:  11/03/2020 - 02/03/2021    Referring Provider:  Dr. Miles Peña MD  Primary Provider: Isidro Maldonado MD  Consulting Providers:  Surgeon: Dr. Miles Peña MD      Start of Care: 11/03/2020  Reason for referral: Post surgical f/u for teaching, support, supply/product selection      SUBJECTIVE:  \"We put it on on Saturday and it hasn't leaked (Day 4). I'm scheduled for reversal surgery on 12/15/2020\".    HPI: Pt is a 70 year old gentleman with a hx of rectal ca, underwent chemo and radiation, referred by surgeon s/p LAR with diverting ileostomy at [facility] on 10/21/2020.     Past Medical Hx:  Past Medical History:   Diagnosis Date   • Cancer (HCC) 02/04/2020    rectal cancer   • Hypertension      Surgical Hx:   Past Surgical History:   Procedure Laterality Date   • PB COLONOSCOPY,DIAGNOSTIC  2/19/2020    Procedure: COLONOSCOPY;  Surgeon: Carlyle Gonsalez M.D.;  Location: SURGERY [facility];  Service: Gastroenterology   • COLONOSCOPY FLEX W/ENDO US  2/19/2020    Procedure: COLONOSCOPY, FLEXIBLE, WITH ENDOSCOPIC US-LOWER RADIAL;  Surgeon: Carlyle Gonsalez M.D.;  Location: SURGERY [facility];  Service: Gastroenterology   • COLONOSCOPY  02/04/2020   • APPENDECTOMY  1966     Medications:   Current Outpatient Medications:   •  losartan (COZAAR) 100 MG Tab, Take  by mouth every evening. Take 1 tablet by mouth every day, Disp: , Rfl:   •  Ascorbic Acid (VITAMIN C) 1000 MG Tab, Take  by mouth every day., Disp: , Rfl:   Allergies: Patient has no known allergies.    Social Hx:  Social History     Socioeconomic History   • Marital status:      Spouse name: Not on file   • Number of children: Not on file   • Years of education: Not on file   • Highest education level: Not on file   Occupational History   • Not on file   Social " "Needs   • Financial resource strain: Not on file   • Food insecurity     Worry: Not on file     Inability: Not on file   • Transportation needs     Medical: Not on file     Non-medical: Not on file   Tobacco Use   • Smoking status: Never Smoker   • Smokeless tobacco: Never Used   Substance and Sexual Activity   • Alcohol use: Yes     Comment: 8 shots per day   • Drug use: Never   • Sexual activity: Not on file   Lifestyle   • Physical activity     Days per week: Not on file     Minutes per session: Not on file   • Stress: Not on file   Relationships   • Social connections     Talks on phone: Not on file     Gets together: Not on file     Attends Hindu service: Not on file     Active member of club or organization: Not on file     Attends meetings of clubs or organizations: Not on file     Relationship status: Not on file   • Intimate partner violence     Fear of current or ex partner: Not on file     Emotionally abused: Not on file     Physically abused: Not on file     Forced sexual activity: Not on file   Other Topics Concern   • Not on file   Social History Narrative   • Not on file         OBJECTIVE:     STOMA ASSESSMENT:   Type:  __x   loop__Ileostomy     ____Colostomy    ____Urostomy    ____Other     Location:  RUQ    Size:1 3/4\"    Shape:oval   Color:red, moist   Protrudes:      ___ >1 inch               __x_ <1 inch   [place]:  Patent, superior os   Mucocutaneous Junction:  separation at 6-10 o'clock has granulated in to surface; small granuloma developed which is bleeding - cauterized with silver nitrate   Skin indentations, creases or scar tissue: none noted   Effie-stomal Skin Problems: blister and dermatitis resolved   Effluent / Flatus: soft brown   Photo  __x__ Yes ____ No                    Pouching Procedure:   Old appliance removed using Arsenio medical adhesive spray.    Peristomal skin cleansed with: warm water on a washcloth   Peristomal skin treated with:  Granuloma cauterized with silver " "nitrate, flushed with NSS. Brava strips to fragile epithelial tissue at previously blistered areas before placing flange   Ostomy appliance used: Arsenio flat 70mm CTF with a 2\" paste ring and corresponding 70mm pouch      Patient Education: Change urostomies and ileostomies every 3-5 days. Change colostomies every 5-7 days. Change appliance immediately if it is leaking or peristomal skin feels irritated, has itching, or  burning.   To change the appliance, remove previous appliance, cleanse peristomal skin with warm water/washcloth, pat dry, make an ostomy template or use cardboard measuring guide and trace ostomy shape onto back of barrier, cut out barrier, apply a paste ring around barrier opening and apply appliance. Empty pouches when no more than ½ full. Check contents every 2 hours or as needed. Do not leave soap residue on tissue and do not use baby wipes or skin prep wipes.     Should you experience any significant changes in your condition, such as infection (redness, swelling, localized heat, increased pain, fever > 101 F, chills) or have any questions regarding your home care instructions, please contact the wound center at [phone]. If after hours, contact your primary care physician or go to the hospital emergency room.       Verbal/demonstration instructions of above pouching procedure provided to patient/family.      Response: pt and spouse with good comprehension of instr today      PLAN: see pt in 1 week to assess granuloma, he and spouse will change appliance in the meantime.     Supplies Needed:   Appliance type:    Cec[place] - as above             Other:      Accessories:         Supplier:  [employer]    Frequency:  1 x week       Professional Collaboration:  None.     At the time of each visit, a thorough assessment of the patient is completed to assure appropriateness of our plan of care.  The plan of care may need to be adapted from the original plan of care to address any significant " changes in patient status.    Clinician Signature:  _________________________________  Date:  ____________    Physician Signature:  ________________________________  Date:  ____________

## 2020-12-03 ENCOUNTER — HOSPITAL ENCOUNTER (OUTPATIENT)
Dept: RADIOLOGY | Facility: MEDICAL CENTER | Age: 70
End: 2020-12-03
Attending: SURGERY
Payer: MEDICARE

## 2020-12-03 DIAGNOSIS — Z93.2 ILEOSTOMY STATUS (HCC): ICD-10-CM

## 2020-12-03 PROCEDURE — 700117 HCHG RX CONTRAST REV CODE 255: Performed by: STUDENT IN AN ORGANIZED HEALTH CARE EDUCATION/TRAINING PROGRAM

## 2020-12-03 PROCEDURE — 74270 X-RAY XM COLON 1CNTRST STD: CPT

## 2020-12-03 PROCEDURE — 700117 HCHG RX CONTRAST REV CODE 255: Performed by: SURGERY

## 2020-12-03 RX ADMIN — IOHEXOL 200 ML: 300 INJECTION, SOLUTION INTRAVENOUS at 10:00

## 2020-12-08 ENCOUNTER — NON-PROVIDER VISIT (OUTPATIENT)
Dept: WOUND CARE | Facility: MEDICAL CENTER | Age: 70
End: 2020-12-08
Attending: SURGERY
Payer: MEDICARE

## 2020-12-08 PROCEDURE — 99211 OFF/OP EST MAY X REQ PHY/QHP: CPT

## 2020-12-08 NOTE — NON-PROVIDER
"Advanced Wound Care  [facility]  [address]  [place] [address]  [phone] Fax#    Ostomy Evaluation  For 90 Day Certification Period:  11/03/2020 - 02/03/2021    Referring Provider:  Dr. Miles Peña MD  Primary Provider: Isidro Maldonado MD  Consulting Providers:  Surgeon: Dr. Miles Peña MD      Start of Care: 11/03/2020  Reason for referral: Post surgical f/u for teaching, support, supply/product selection      SUBJECTIVE:  \"We put it on on Sunday and it hasn't leaked (Day 3). I'm scheduled for reversal surgery on 12/15/2020\".    HPI: Pt is a 70 year old gentleman with a hx of rectal ca, underwent chemo and radiation, referred by surgeon s/p LAR with diverting ileostomy at [facility] on 10/21/2020.     Past Medical Hx:  Past Medical History:   Diagnosis Date   • Cancer (HCC) 02/04/2020    rectal cancer   • Hypertension      Surgical Hx:   Past Surgical History:   Procedure Laterality Date   • PB COLONOSCOPY,DIAGNOSTIC  2/19/2020    Procedure: COLONOSCOPY;  Surgeon: Carlyle Gonsalez M.D.;  Location: SURGERY [facility];  Service: Gastroenterology   • COLONOSCOPY FLEX W/ENDO US  2/19/2020    Procedure: COLONOSCOPY, FLEXIBLE, WITH ENDOSCOPIC US-LOWER RADIAL;  Surgeon: Carlyle Gonsalez M.D.;  Location: SURGERY [facility];  Service: Gastroenterology   • COLONOSCOPY  02/04/2020   • APPENDECTOMY  1966     Medications:   Current Outpatient Medications:   •  losartan (COZAAR) 100 MG Tab, Take  by mouth every evening. Take 1 tablet by mouth every day, Disp: , Rfl:   •  Ascorbic Acid (VITAMIN C) 1000 MG Tab, Take  by mouth every day., Disp: , Rfl:   Allergies: Patient has no known allergies.    Social Hx:  Social History     Socioeconomic History   • Marital status:      Spouse name: Not on file   • Number of children: Not on file   • Years of education: Not on file   • Highest education level: Not on file   Occupational History   • Not on file   Social Needs " "  • Financial resource strain: Not on file   • Food insecurity     Worry: Not on file     Inability: Not on file   • Transportation needs     Medical: Not on file     Non-medical: Not on file   Tobacco Use   • Smoking status: Never Smoker   • Smokeless tobacco: Never Used   Substance and Sexual Activity   • Alcohol use: Yes     Comment: 8 shots per day   • Drug use: Never   • Sexual activity: Not on file   Lifestyle   • Physical activity     Days per week: Not on file     Minutes per session: Not on file   • Stress: Not on file   Relationships   • Social connections     Talks on phone: Not on file     Gets together: Not on file     Attends Orthodox service: Not on file     Active member of club or organization: Not on file     Attends meetings of clubs or organizations: Not on file     Relationship status: Not on file   • Intimate partner violence     Fear of current or ex partner: Not on file     Emotionally abused: Not on file     Physically abused: Not on file     Forced sexual activity: Not on file   Other Topics Concern   • Not on file   Social History Narrative   • Not on file         OBJECTIVE:     STOMA ASSESSMENT:   Type:  __x   loop__Ileostomy     ____Colostomy    ____Urostomy    ____Other     Location:  RU    Size:1 3/4\"    Shape:oval   Color:red, moist   Protrudes:      ___ >1 inch               __x_ <1 inch   [place]:  Patent, superior os   Mucocutaneous Junction:  separation at 6-10 o'clock has granulated in to surface; small granuloma has resolved   Skin indentations, creases or scar tissue: none noted   Effie-stomal Skin Problems: blister and dermatitis resolved   Effluent / Flatus: soft brown   Photo  __x__ Yes ____ No            Pouching Procedure:   Old appliance removed using Magnolia medical adhesive spray.    Peristomal skin cleansed with: warm water on a washcloth   Peristomal skin treated with:  Luxiq foam to inflamed areas peristoma   Ostomy appliance used: Magnolia soft convex 70mm CTF with " "a 2\" paste ring and corresponding 70mm pouch      Patient Education: Instr pt to call on Friday after he hears from surgery scheduling that the surgery is going to go ahead, to cx his further appointments. He understands and says he will do so.  Change urostomies and ileostomies every 3-5 days. Change colostomies every 5-7 days. Change appliance immediately if it is leaking or peristomal skin feels irritated, has itching, or  burning.   To change the appliance, remove previous appliance, cleanse peristomal skin with warm water/washcloth, pat dry, make an ostomy template or use cardboard measuring guide and trace ostomy shape onto back of barrier, cut out barrier, apply a paste ring around barrier opening and apply appliance. Empty pouches when no more than ½ full. Check contents every 2 hours or as needed. Do not leave soap residue on tissue and do not use baby wipes or skin prep wipes.     Should you experience any significant changes in your condition, such as infection (redness, swelling, localized heat, increased pain, fever > 101 F, chills) or have any questions regarding your home care instructions, please contact the wound center at [phone]. If after hours, contact your primary care physician or go to the hospital emergency room.       Verbal/demonstration instructions of above pouching procedure provided to patient/family.      Response: pt and spouse with good comprehension of instr today      PLAN: see pt in 1 week if surgery is cx, otherwise pt will call this Friday 12/11/2020 to cx his further appointments.     Supplies Needed:   Appliance type:    [place] - as above             Other:      Accessories:         Supplier:  [employer]    Frequency:  1 x week       Professional Collaboration:  None.     At the time of each visit, a thorough assessment of the patient is completed to assure appropriateness of our plan of care.  The plan of care may need to be adapted from the original plan of care to " address any significant changes in patient status.    Clinician Signature:  _________________________________  Date:  ____________    Physician Signature:  ________________________________  Date:  ____________

## 2020-12-08 NOTE — PATIENT INSTRUCTIONS
Change urostomies and ileostomies every 3-5 days. Change colostomies every 5-7 days. Change appliance immediately if it is leaking or peristomal skin feels irritated, has itching, or  burning.   To change the appliance, remove previous appliance, cleanse peristomal skin with warm water/washcloth, pat dry, make an ostomy template or use cardboard measuring guide and trace ostomy shape onto back of barrier, cut out barrier, apply a paste ring around barrier opening and apply appliance. Empty pouches when no more than ½ full. Check contents every 2 hours or as needed. Do not leave soap residue on tissue and do not use baby wipes or skin prep wipes.     Should you experience any significant changes in your condition, such as infection (redness, swelling, localized heat, increased pain, fever > 101 F, chills) or have any questions regarding your home care instructions, please contact the wound center at [phone]. If after hours, contact your primary care physician or go to the hospital emergency room.

## 2020-12-11 ENCOUNTER — PRE-ADMISSION TESTING (OUTPATIENT)
Dept: ADMISSIONS | Facility: MEDICAL CENTER | Age: 70
DRG: 331 | End: 2020-12-11
Attending: SURGERY
Payer: MEDICARE

## 2020-12-11 DIAGNOSIS — Z01.810 PRE-OPERATIVE CARDIOVASCULAR EXAMINATION: ICD-10-CM

## 2020-12-11 DIAGNOSIS — Z01.812 PRE-OPERATIVE LABORATORY EXAMINATION: ICD-10-CM

## 2020-12-11 LAB
ANION GAP SERPL CALC-SCNC: 6 MMOL/L (ref 7–16)
BUN SERPL-MCNC: 17 MG/DL (ref 8–22)
CALCIUM SERPL-MCNC: 10.1 MG/DL (ref 8.5–10.5)
CHLORIDE SERPL-SCNC: 100 MMOL/L (ref 96–112)
CO2 SERPL-SCNC: 28 MMOL/L (ref 20–33)
COVID ORDER STATUS COVID19: NORMAL
CREAT SERPL-MCNC: 1.13 MG/DL (ref 0.5–1.4)
EKG IMPRESSION: NORMAL
ERYTHROCYTE [DISTWIDTH] IN BLOOD BY AUTOMATED COUNT: 49.6 FL (ref 35.9–50)
GLUCOSE SERPL-MCNC: 90 MG/DL (ref 65–99)
HCT VFR BLD AUTO: 43.1 % (ref 42–52)
HGB BLD-MCNC: 14.1 G/DL (ref 14–18)
MCH RBC QN AUTO: 34.3 PG (ref 27–33)
MCHC RBC AUTO-ENTMCNC: 32.7 G/DL (ref 33.7–35.3)
MCV RBC AUTO: 104.9 FL (ref 81.4–97.8)
PLATELET # BLD AUTO: 126 K/UL (ref 164–446)
PMV BLD AUTO: 9.8 FL (ref 9–12.9)
POTASSIUM SERPL-SCNC: 4.4 MMOL/L (ref 3.6–5.5)
RBC # BLD AUTO: 4.11 M/UL (ref 4.7–6.1)
SARS-COV-2 RNA RESP QL NAA+PROBE: NOTDETECTED
SODIUM SERPL-SCNC: 134 MMOL/L (ref 135–145)
SPECIMEN SOURCE: NORMAL
WBC # BLD AUTO: 6.5 K/UL (ref 4.8–10.8)

## 2020-12-11 PROCEDURE — 80048 BASIC METABOLIC PNL TOTAL CA: CPT

## 2020-12-11 PROCEDURE — 85027 COMPLETE CBC AUTOMATED: CPT

## 2020-12-11 PROCEDURE — 93010 ELECTROCARDIOGRAM REPORT: CPT | Performed by: INTERNAL MEDICINE

## 2020-12-11 PROCEDURE — 93005 ELECTROCARDIOGRAM TRACING: CPT

## 2020-12-11 PROCEDURE — 36415 COLL VENOUS BLD VENIPUNCTURE: CPT

## 2020-12-11 PROCEDURE — U0003 INFECTIOUS AGENT DETECTION BY NUCLEIC ACID (DNA OR RNA); SEVERE ACUTE RESPIRATORY SYNDROME CORONAVIRUS 2 (SARS-COV-2) (CORONAVIRUS DISEASE [COVID-19]), AMPLIFIED PROBE TECHNIQUE, MAKING USE OF HIGH THROUGHPUT TECHNOLOGIES AS DESCRIBED BY CMS-2020-01-R: HCPCS

## 2020-12-11 RX ORDER — GABAPENTIN 100 MG/1
200 CAPSULE ORAL 3 TIMES DAILY
Status: ON HOLD | COMMUNITY
End: 2020-12-29

## 2020-12-11 RX ORDER — HYDROXYZINE PAMOATE 25 MG/1
25 CAPSULE ORAL EVERY EVENING
COMMUNITY
End: 2022-10-11

## 2020-12-11 SDOH — HEALTH STABILITY: MENTAL HEALTH: HOW OFTEN DO YOU HAVE A DRINK CONTAINING ALCOHOL?: 4 OR MORE TIMES A WEEK

## 2020-12-11 SDOH — HEALTH STABILITY: MENTAL HEALTH: HOW MANY STANDARD DRINKS CONTAINING ALCOHOL DO YOU HAVE ON A TYPICAL DAY?: 3 OR 4

## 2020-12-15 ENCOUNTER — APPOINTMENT (OUTPATIENT)
Dept: WOUND CARE | Facility: MEDICAL CENTER | Age: 70
End: 2020-12-15
Attending: SURGERY
Payer: MEDICARE

## 2020-12-15 ENCOUNTER — HOSPITAL ENCOUNTER (INPATIENT)
Facility: MEDICAL CENTER | Age: 70
LOS: 1 days | DRG: 331 | End: 2020-12-16
Attending: SURGERY | Admitting: SURGERY
Payer: MEDICARE

## 2020-12-15 ENCOUNTER — ANESTHESIA EVENT (OUTPATIENT)
Dept: SURGERY | Facility: MEDICAL CENTER | Age: 70
DRG: 331 | End: 2020-12-15
Payer: MEDICARE

## 2020-12-15 ENCOUNTER — ANESTHESIA (OUTPATIENT)
Dept: SURGERY | Facility: MEDICAL CENTER | Age: 70
DRG: 331 | End: 2020-12-15
Payer: MEDICARE

## 2020-12-15 LAB
GLUCOSE BLD-MCNC: 90 MG/DL (ref 65–99)
PATHOLOGY CONSULT NOTE: NORMAL

## 2020-12-15 PROCEDURE — 160041 HCHG SURGERY MINUTES - EA ADDL 1 MIN LEVEL 4: Performed by: SURGERY

## 2020-12-15 PROCEDURE — 700105 HCHG RX REV CODE 258: Performed by: SURGERY

## 2020-12-15 PROCEDURE — 502704 HCHG DEVICE, LIGASURE IMPACT: Performed by: SURGERY

## 2020-12-15 PROCEDURE — 82962 GLUCOSE BLOOD TEST: CPT

## 2020-12-15 PROCEDURE — 770006 HCHG ROOM/CARE - MED/SURG/GYN SEMI*

## 2020-12-15 PROCEDURE — A9270 NON-COVERED ITEM OR SERVICE: HCPCS | Performed by: ANESTHESIOLOGY

## 2020-12-15 PROCEDURE — 501452 HCHG STAPLES, GIA MULTIFIRE 60/80: Performed by: SURGERY

## 2020-12-15 PROCEDURE — 160009 HCHG ANES TIME/MIN: Performed by: SURGERY

## 2020-12-15 PROCEDURE — 0DBB0ZZ EXCISION OF ILEUM, OPEN APPROACH: ICD-10-PCS | Performed by: SURGERY

## 2020-12-15 PROCEDURE — 700105 HCHG RX REV CODE 258: Performed by: ANESTHESIOLOGY

## 2020-12-15 PROCEDURE — 700101 HCHG RX REV CODE 250: Performed by: SURGERY

## 2020-12-15 PROCEDURE — 160029 HCHG SURGERY MINUTES - 1ST 30 MINS LEVEL 4: Performed by: SURGERY

## 2020-12-15 PROCEDURE — 700111 HCHG RX REV CODE 636 W/ 250 OVERRIDE (IP): Performed by: ANESTHESIOLOGY

## 2020-12-15 PROCEDURE — 500380 HCHG DRAIN, PENROSE 1/4X12: Performed by: SURGERY

## 2020-12-15 PROCEDURE — 160002 HCHG RECOVERY MINUTES (STAT): Performed by: SURGERY

## 2020-12-15 PROCEDURE — 160036 HCHG PACU - EA ADDL 30 MINS PHASE I: Performed by: SURGERY

## 2020-12-15 PROCEDURE — C1765 ADHESION BARRIER: HCPCS | Performed by: SURGERY

## 2020-12-15 PROCEDURE — 501838 HCHG SUTURE GENERAL: Performed by: SURGERY

## 2020-12-15 PROCEDURE — 700101 HCHG RX REV CODE 250: Performed by: ANESTHESIOLOGY

## 2020-12-15 PROCEDURE — A9270 NON-COVERED ITEM OR SERVICE: HCPCS | Performed by: SURGERY

## 2020-12-15 PROCEDURE — 88304 TISSUE EXAM BY PATHOLOGIST: CPT

## 2020-12-15 PROCEDURE — 700102 HCHG RX REV CODE 250 W/ 637 OVERRIDE(OP): Performed by: ANESTHESIOLOGY

## 2020-12-15 PROCEDURE — 160048 HCHG OR STATISTICAL LEVEL 1-5: Performed by: SURGERY

## 2020-12-15 PROCEDURE — 160035 HCHG PACU - 1ST 60 MINS PHASE I: Performed by: SURGERY

## 2020-12-15 PROCEDURE — 700102 HCHG RX REV CODE 250 W/ 637 OVERRIDE(OP): Performed by: SURGERY

## 2020-12-15 PROCEDURE — 501435 HCHG STAPLER, LINEAR 60: Performed by: SURGERY

## 2020-12-15 PROCEDURE — 0JPT3WZ REMOVAL OF TOTALLY IMPLANTABLE VASCULAR ACCESS DEVICE FROM TRUNK SUBCUTANEOUS TISSUE AND FASCIA, PERCUTANEOUS APPROACH: ICD-10-PCS | Performed by: SURGERY

## 2020-12-15 PROCEDURE — 501433 HCHG STAPLER, GIA MULTIFIRE 60/80: Performed by: SURGERY

## 2020-12-15 RX ORDER — DEXAMETHASONE SODIUM PHOSPHATE 4 MG/ML
INJECTION, SOLUTION INTRA-ARTICULAR; INTRALESIONAL; INTRAMUSCULAR; INTRAVENOUS; SOFT TISSUE PRN
Status: DISCONTINUED | OUTPATIENT
Start: 2020-12-15 | End: 2020-12-15 | Stop reason: SURG

## 2020-12-15 RX ORDER — ROCURONIUM BROMIDE 10 MG/ML
INJECTION, SOLUTION INTRAVENOUS PRN
Status: DISCONTINUED | OUTPATIENT
Start: 2020-12-15 | End: 2020-12-15 | Stop reason: SURG

## 2020-12-15 RX ORDER — CALCIUM CARBONATE 500 MG/1
500 TABLET, CHEWABLE ORAL
Status: DISCONTINUED | OUTPATIENT
Start: 2020-12-15 | End: 2020-12-16 | Stop reason: HOSPADM

## 2020-12-15 RX ORDER — SCOLOPAMINE TRANSDERMAL SYSTEM 1 MG/1
1 PATCH, EXTENDED RELEASE TRANSDERMAL
Status: DISCONTINUED | OUTPATIENT
Start: 2020-12-15 | End: 2020-12-16 | Stop reason: HOSPADM

## 2020-12-15 RX ORDER — OXYCODONE HCL 5 MG/5 ML
5 SOLUTION, ORAL ORAL
Status: COMPLETED | OUTPATIENT
Start: 2020-12-15 | End: 2020-12-15

## 2020-12-15 RX ORDER — CEFOTETAN DISODIUM 2 G/20ML
INJECTION, POWDER, FOR SOLUTION INTRAMUSCULAR; INTRAVENOUS PRN
Status: DISCONTINUED | OUTPATIENT
Start: 2020-12-15 | End: 2020-12-15 | Stop reason: SURG

## 2020-12-15 RX ORDER — CELECOXIB 200 MG/1
200 CAPSULE ORAL 2 TIMES DAILY
Status: DISCONTINUED | OUTPATIENT
Start: 2020-12-15 | End: 2020-12-16 | Stop reason: HOSPADM

## 2020-12-15 RX ORDER — LIDOCAINE HYDROCHLORIDE 40 MG/ML
SOLUTION TOPICAL PRN
Status: DISCONTINUED | OUTPATIENT
Start: 2020-12-15 | End: 2020-12-15 | Stop reason: SURG

## 2020-12-15 RX ORDER — ONDANSETRON 2 MG/ML
INJECTION INTRAMUSCULAR; INTRAVENOUS PRN
Status: DISCONTINUED | OUTPATIENT
Start: 2020-12-15 | End: 2020-12-15 | Stop reason: SURG

## 2020-12-15 RX ORDER — MAGNESIUM SULFATE HEPTAHYDRATE 500 MG/ML
INJECTION, SOLUTION INTRAMUSCULAR; INTRAVENOUS PRN
Status: DISCONTINUED | OUTPATIENT
Start: 2020-12-15 | End: 2020-12-15 | Stop reason: SURG

## 2020-12-15 RX ORDER — ACETAMINOPHEN 500 MG
1000 TABLET ORAL ONCE
Status: COMPLETED | OUTPATIENT
Start: 2020-12-15 | End: 2020-12-15

## 2020-12-15 RX ORDER — PHENYLEPHRINE HYDROCHLORIDE 10 MG/ML
INJECTION, SOLUTION INTRAMUSCULAR; INTRAVENOUS; SUBCUTANEOUS PRN
Status: DISCONTINUED | OUTPATIENT
Start: 2020-12-15 | End: 2020-12-15 | Stop reason: SURG

## 2020-12-15 RX ORDER — DIPHENHYDRAMINE HYDROCHLORIDE 50 MG/ML
25 INJECTION INTRAMUSCULAR; INTRAVENOUS EVERY 6 HOURS PRN
Status: DISCONTINUED | OUTPATIENT
Start: 2020-12-15 | End: 2020-12-16 | Stop reason: HOSPADM

## 2020-12-15 RX ORDER — SODIUM CHLORIDE, SODIUM LACTATE, POTASSIUM CHLORIDE, CALCIUM CHLORIDE 600; 310; 30; 20 MG/100ML; MG/100ML; MG/100ML; MG/100ML
INJECTION, SOLUTION INTRAVENOUS CONTINUOUS
Status: ACTIVE | OUTPATIENT
Start: 2020-12-15 | End: 2020-12-15

## 2020-12-15 RX ORDER — HYDRALAZINE HYDROCHLORIDE 20 MG/ML
5 INJECTION INTRAMUSCULAR; INTRAVENOUS
Status: DISCONTINUED | OUTPATIENT
Start: 2020-12-15 | End: 2020-12-15 | Stop reason: HOSPADM

## 2020-12-15 RX ORDER — GABAPENTIN 300 MG/1
300 CAPSULE ORAL
Status: COMPLETED | OUTPATIENT
Start: 2020-12-15 | End: 2020-12-15

## 2020-12-15 RX ORDER — LOSARTAN POTASSIUM 50 MG/1
100 TABLET ORAL EVERY EVENING
Status: DISCONTINUED | OUTPATIENT
Start: 2020-12-15 | End: 2020-12-16 | Stop reason: HOSPADM

## 2020-12-15 RX ORDER — HALOPERIDOL 5 MG/ML
1 INJECTION INTRAMUSCULAR EVERY 6 HOURS PRN
Status: DISCONTINUED | OUTPATIENT
Start: 2020-12-15 | End: 2020-12-16 | Stop reason: HOSPADM

## 2020-12-15 RX ORDER — MEPERIDINE HYDROCHLORIDE 25 MG/ML
12.5 INJECTION INTRAMUSCULAR; INTRAVENOUS; SUBCUTANEOUS
Status: DISCONTINUED | OUTPATIENT
Start: 2020-12-15 | End: 2020-12-15 | Stop reason: HOSPADM

## 2020-12-15 RX ORDER — KETAMINE HYDROCHLORIDE 50 MG/ML
INJECTION, SOLUTION INTRAMUSCULAR; INTRAVENOUS PRN
Status: DISCONTINUED | OUTPATIENT
Start: 2020-12-15 | End: 2020-12-15 | Stop reason: SURG

## 2020-12-15 RX ORDER — HALOPERIDOL 5 MG/ML
1 INJECTION INTRAMUSCULAR
Status: DISCONTINUED | OUTPATIENT
Start: 2020-12-15 | End: 2020-12-15 | Stop reason: HOSPADM

## 2020-12-15 RX ORDER — ACETAMINOPHEN 500 MG
1000 TABLET ORAL EVERY 6 HOURS
Status: DISCONTINUED | OUTPATIENT
Start: 2020-12-15 | End: 2020-12-16 | Stop reason: HOSPADM

## 2020-12-15 RX ORDER — HYDROXYZINE PAMOATE 25 MG/1
25 CAPSULE ORAL DAILY
Status: DISCONTINUED | OUTPATIENT
Start: 2020-12-15 | End: 2020-12-15

## 2020-12-15 RX ORDER — DIPHENHYDRAMINE HYDROCHLORIDE 50 MG/ML
6.25 INJECTION INTRAMUSCULAR; INTRAVENOUS
Status: DISCONTINUED | OUTPATIENT
Start: 2020-12-15 | End: 2020-12-15 | Stop reason: HOSPADM

## 2020-12-15 RX ORDER — GABAPENTIN 100 MG/1
200 CAPSULE ORAL 3 TIMES DAILY
Status: DISCONTINUED | OUTPATIENT
Start: 2020-12-15 | End: 2020-12-16 | Stop reason: HOSPADM

## 2020-12-15 RX ORDER — ONDANSETRON 2 MG/ML
4 INJECTION INTRAMUSCULAR; INTRAVENOUS
Status: DISCONTINUED | OUTPATIENT
Start: 2020-12-15 | End: 2020-12-15 | Stop reason: HOSPADM

## 2020-12-15 RX ORDER — HYDROMORPHONE HYDROCHLORIDE 1 MG/ML
0.4 INJECTION, SOLUTION INTRAMUSCULAR; INTRAVENOUS; SUBCUTANEOUS
Status: DISCONTINUED | OUTPATIENT
Start: 2020-12-15 | End: 2020-12-15 | Stop reason: HOSPADM

## 2020-12-15 RX ORDER — HYDROMORPHONE HYDROCHLORIDE 1 MG/ML
0.2 INJECTION, SOLUTION INTRAMUSCULAR; INTRAVENOUS; SUBCUTANEOUS
Status: DISCONTINUED | OUTPATIENT
Start: 2020-12-15 | End: 2020-12-15 | Stop reason: HOSPADM

## 2020-12-15 RX ORDER — SODIUM CHLORIDE, SODIUM LACTATE, POTASSIUM CHLORIDE, CALCIUM CHLORIDE 600; 310; 30; 20 MG/100ML; MG/100ML; MG/100ML; MG/100ML
INJECTION, SOLUTION INTRAVENOUS CONTINUOUS
Status: DISCONTINUED | OUTPATIENT
Start: 2020-12-15 | End: 2020-12-15 | Stop reason: HOSPADM

## 2020-12-15 RX ORDER — LIDOCAINE HYDROCHLORIDE 20 MG/ML
INJECTION, SOLUTION EPIDURAL; INFILTRATION; INTRACAUDAL; PERINEURAL PRN
Status: DISCONTINUED | OUTPATIENT
Start: 2020-12-15 | End: 2020-12-15 | Stop reason: SURG

## 2020-12-15 RX ORDER — HYDROXYZINE HYDROCHLORIDE 25 MG/1
25 TABLET, FILM COATED ORAL DAILY
Status: DISCONTINUED | OUTPATIENT
Start: 2020-12-16 | End: 2020-12-16 | Stop reason: HOSPADM

## 2020-12-15 RX ORDER — OXYCODONE HCL 5 MG/5 ML
10 SOLUTION, ORAL ORAL
Status: COMPLETED | OUTPATIENT
Start: 2020-12-15 | End: 2020-12-15

## 2020-12-15 RX ORDER — TRAZODONE HYDROCHLORIDE 50 MG/1
50 TABLET ORAL NIGHTLY PRN
Status: DISCONTINUED | OUTPATIENT
Start: 2020-12-15 | End: 2020-12-16 | Stop reason: HOSPADM

## 2020-12-15 RX ORDER — HYDROMORPHONE HYDROCHLORIDE 1 MG/ML
0.1 INJECTION, SOLUTION INTRAMUSCULAR; INTRAVENOUS; SUBCUTANEOUS
Status: DISCONTINUED | OUTPATIENT
Start: 2020-12-15 | End: 2020-12-15 | Stop reason: HOSPADM

## 2020-12-15 RX ORDER — ONDANSETRON 2 MG/ML
4 INJECTION INTRAMUSCULAR; INTRAVENOUS EVERY 4 HOURS PRN
Status: DISCONTINUED | OUTPATIENT
Start: 2020-12-15 | End: 2020-12-16 | Stop reason: HOSPADM

## 2020-12-15 RX ORDER — DIPHENHYDRAMINE HCL 25 MG
25 TABLET ORAL EVERY 6 HOURS PRN
Status: DISCONTINUED | OUTPATIENT
Start: 2020-12-15 | End: 2020-12-16 | Stop reason: HOSPADM

## 2020-12-15 RX ORDER — DEXAMETHASONE SODIUM PHOSPHATE 4 MG/ML
4 INJECTION, SOLUTION INTRA-ARTICULAR; INTRALESIONAL; INTRAMUSCULAR; INTRAVENOUS; SOFT TISSUE
Status: DISCONTINUED | OUTPATIENT
Start: 2020-12-15 | End: 2020-12-16 | Stop reason: HOSPADM

## 2020-12-15 RX ADMIN — EPHEDRINE SULFATE 10 MG: 50 INJECTION, SOLUTION INTRAVENOUS at 10:53

## 2020-12-15 RX ADMIN — FENTANYL CITRATE 50 MCG: 50 INJECTION, SOLUTION INTRAMUSCULAR; INTRAVENOUS at 14:31

## 2020-12-15 RX ADMIN — ROCURONIUM BROMIDE 50 MG: 10 INJECTION, SOLUTION INTRAVENOUS at 10:31

## 2020-12-15 RX ADMIN — KETAMINE HYDROCHLORIDE 40 MG: 50 INJECTION INTRAMUSCULAR; INTRAVENOUS at 10:31

## 2020-12-15 RX ADMIN — MAGNESIUM SULFATE HEPTAHYDRATE 3 G: 500 INJECTION, SOLUTION INTRAMUSCULAR; INTRAVENOUS at 10:36

## 2020-12-15 RX ADMIN — CEFOTETAN DISODIUM 2 G: 2 INJECTION, POWDER, FOR SOLUTION INTRAMUSCULAR; INTRAVENOUS at 10:31

## 2020-12-15 RX ADMIN — FENTANYL CITRATE 50 MCG: 50 INJECTION, SOLUTION INTRAMUSCULAR; INTRAVENOUS at 11:22

## 2020-12-15 RX ADMIN — PROPOFOL 150 MG: 10 INJECTION, EMULSION INTRAVENOUS at 10:31

## 2020-12-15 RX ADMIN — GABAPENTIN 200 MG: 100 CAPSULE ORAL at 20:57

## 2020-12-15 RX ADMIN — GABAPENTIN 300 MG: 300 CAPSULE ORAL at 09:56

## 2020-12-15 RX ADMIN — ROCURONIUM BROMIDE 20 MG: 10 INJECTION, SOLUTION INTRAVENOUS at 10:59

## 2020-12-15 RX ADMIN — SODIUM CHLORIDE, POTASSIUM CHLORIDE, SODIUM LACTATE AND CALCIUM CHLORIDE: 600; 310; 30; 20 INJECTION, SOLUTION INTRAVENOUS at 09:56

## 2020-12-15 RX ADMIN — LOSARTAN POTASSIUM 100 MG: 50 TABLET, FILM COATED ORAL at 17:20

## 2020-12-15 RX ADMIN — OXYCODONE HYDROCHLORIDE 10 MG: 5 SOLUTION ORAL at 13:29

## 2020-12-15 RX ADMIN — FENTANYL CITRATE 100 MCG: 50 INJECTION, SOLUTION INTRAMUSCULAR; INTRAVENOUS at 10:31

## 2020-12-15 RX ADMIN — EPHEDRINE SULFATE 10 MG: 50 INJECTION, SOLUTION INTRAVENOUS at 10:36

## 2020-12-15 RX ADMIN — LIDOCAINE HYDROCHLORIDE 4 ML: 40 SOLUTION TOPICAL at 10:31

## 2020-12-15 RX ADMIN — EPHEDRINE SULFATE 10 MG: 50 INJECTION, SOLUTION INTRAVENOUS at 10:42

## 2020-12-15 RX ADMIN — LIDOCAINE HYDROCHLORIDE 60 MG: 20 INJECTION, SOLUTION EPIDURAL; INFILTRATION; INTRACAUDAL at 10:31

## 2020-12-15 RX ADMIN — SUGAMMADEX 200 MG: 100 INJECTION, SOLUTION INTRAVENOUS at 11:26

## 2020-12-15 RX ADMIN — CELECOXIB 200 MG: 200 CAPSULE ORAL at 17:20

## 2020-12-15 RX ADMIN — POVIDONE IODINE 15 ML: 100 SOLUTION TOPICAL at 09:56

## 2020-12-15 RX ADMIN — SODIUM CHLORIDE, POTASSIUM CHLORIDE, SODIUM LACTATE AND CALCIUM CHLORIDE: 600; 310; 30; 20 INJECTION, SOLUTION INTRAVENOUS at 13:31

## 2020-12-15 RX ADMIN — ACETAMINOPHEN 1000 MG: 500 TABLET ORAL at 17:20

## 2020-12-15 RX ADMIN — DEXAMETHASONE SODIUM PHOSPHATE 8 MG: 4 INJECTION, SOLUTION INTRA-ARTICULAR; INTRALESIONAL; INTRAMUSCULAR; INTRAVENOUS; SOFT TISSUE at 10:36

## 2020-12-15 RX ADMIN — FENTANYL CITRATE 50 MCG: 50 INJECTION, SOLUTION INTRAMUSCULAR; INTRAVENOUS at 13:16

## 2020-12-15 RX ADMIN — PHENYLEPHRINE HYDROCHLORIDE 50 MCG: 10 INJECTION INTRAVENOUS at 10:53

## 2020-12-15 RX ADMIN — ACETAMINOPHEN 1000 MG: 500 TABLET ORAL at 23:29

## 2020-12-15 RX ADMIN — ACETAMINOPHEN 1000 MG: 500 TABLET ORAL at 09:56

## 2020-12-15 RX ADMIN — ONDANSETRON 4 MG: 2 INJECTION INTRAMUSCULAR; INTRAVENOUS at 11:15

## 2020-12-15 ASSESSMENT — COGNITIVE AND FUNCTIONAL STATUS - GENERAL
SUGGESTED CMS G CODE MODIFIER DAILY ACTIVITY: CH
DAILY ACTIVITIY SCORE: 24
SUGGESTED CMS G CODE MODIFIER MOBILITY: CH
MOBILITY SCORE: 24

## 2020-12-15 ASSESSMENT — PAIN DESCRIPTION - PAIN TYPE
TYPE: SURGICAL PAIN
TYPE: ACUTE PAIN;SURGICAL PAIN

## 2020-12-15 ASSESSMENT — LIFESTYLE VARIABLES
TOTAL SCORE: 0
DOES PATIENT WANT TO STOP DRINKING: NO
EVER HAD A DRINK FIRST THING IN THE MORNING TO STEADY YOUR NERVES TO GET RID OF A HANGOVER: NO
TOTAL SCORE: 0
EVER FELT BAD OR GUILTY ABOUT YOUR DRINKING: NO
TOTAL SCORE: 0
AVERAGE NUMBER OF DAYS PER WEEK YOU HAVE A DRINK CONTAINING ALCOHOL: 7
ON A TYPICAL DAY WHEN YOU DRINK ALCOHOL HOW MANY DRINKS DO YOU HAVE: 3
ALCOHOL_USE: YES
HAVE PEOPLE ANNOYED YOU BY CRITICIZING YOUR DRINKING: NO
CONSUMPTION TOTAL: POSITIVE
HAVE YOU EVER FELT YOU SHOULD CUT DOWN ON YOUR DRINKING: NO
HOW MANY TIMES IN THE PAST YEAR HAVE YOU HAD 5 OR MORE DRINKS IN A DAY: 0

## 2020-12-15 ASSESSMENT — PATIENT HEALTH QUESTIONNAIRE - PHQ9
1. LITTLE INTEREST OR PLEASURE IN DOING THINGS: NOT AT ALL
SUM OF ALL RESPONSES TO PHQ9 QUESTIONS 1 AND 2: 0
2. FEELING DOWN, DEPRESSED, IRRITABLE, OR HOPELESS: NOT AT ALL

## 2020-12-15 NOTE — OP REPORT
Operative Report    Date: 12/15/2020    Surgeon: Casey Aquino M.D.    Assistant: Brandon Vences    Anesthesiologist: Dr. Pérez    Pre-operative Diagnosis: ileostomy in place    Post-operative Diagnosis: same     Procedure: ileostomy reversal    Indications: The patient is a 70 y.o. male who has an ileostomy and desires reversal and removal of Mediport.  Preoperative workup including a flexible sigmoidoscopy and contrast enema evealed no contraindications to reversal.    An extensive PARQ conference was held with the patient and his family, in regard to risks of ileostomy reversal low anterior resection syndrome. The patient was counseled regarding the benefits of the operation, namely, restoration of incontinence. The patient was made aware of the alternatives, including operative and non-operative: continued ostomy care. The risks of bleeding, infection, damage to surrounding structures, need for reoperation, hernia, fistula, leak, stroke, MI, and death were discussed with the patient. The patient was given a chance to ask questions, and all his questions were answered. The patient demonstrates adequate understanding, seems pleased with the plan, and wishes to proceed.    Findings:  Ileostomy in place    Procedure in detail: The patient was identified in the pre-operative holding area and brought to the operating room. Correct side and site were identified. Pre-operative antibiotics of cefotetan were administered prior to the procedure. GETA was smoothly induced. The patient was prepped and draped in the usual sterile fashion.  I made an incision overlying the port and cut the sutures anchoring the port into position.  I removed the port and its catheter in its entirety.  A closed the incision with interrupted 3-0 Vicryl sutures and closed the skin with 4-0 Monocryl in subcuticular fashion and placed Dermabond is applied and dressing cleared and then sutured the ileostomy closed and prepped and draped the  abdomen.    I began by incising the mucocutaneous junction with 15 blade scalpel.  Traction was placed on the skin stabilizing the area and everting the stoma.   Using Allis forceps applied to the rim of the skin attached the stoma and countertraction with right angle retractors in the subcutaneous space the dissection proceeded until the stoma was free of the surrounding abdominal wall fat.  Using a combination of sharp dissection with Metzenbaum and electrocautery avoiding damage to the bowel  And mesentery the peritoneal cavity was entered.   The surgeon's index finger was inserted through the opening sweeping around the anterior peritoneal surface freeing the filmy adhesions between the bowel and peritoneum.  Fibrous adhesions were hooked under the finger and divided with electrocautery under direct visualization.  Once the stoma was fully mobilized I reversed the eversion of the distal stoma and excised all of the skin and scarring back to healthy bowel.    A stapled closure was then performed  As an adequate length of small bowel was able to be delivered from the abdominal cavity.   The open ends of the stoma were stapled closed with a linear cutting stapler and the antimesenteric staple line was opened allowing entry of either limb of the linear cutting stapler.  The mesenteric aspect of the bowel was rotated out of the staple line using DeBakey forceps.  The stapler was fired and the staple line was inspected for hemostasis. A 3-0 Vicryl suture was placed at the joining of the 2 limbs of bowel at the proximal most staple line.  The common enterotomy was then closed with the linear stapler.  The staple line was inspected for hemostasis and oversewn with a 3-0 Vicryl suture.  The bowel was then placed in the abdomen  And the peritoneal sac was excised before closing the fascial and muscle layers using #1 PDS. This and the space was closed with 3-0 Vicryl sutures in an interrupted fashion.  Skin was loosely  approximated with a 4-0 Monocryl. A dry sterile dressing was applied.  The patient was awakened from general anesthetic, and was taken to the recovery room in stable condition.    Sponge and needle counts were correct at the end of the case.     Specimen: ileostomy ends    EBL: 50 mL    UOP: not recorded    Drains: none    Dispo: to PACU    Casey Aquino MD PhD  [facility]  Colon and Rectal Surgeon  [phone]

## 2020-12-15 NOTE — ANESTHESIA POSTPROCEDURE EVALUATION
Patient: Casey Arroyo    Procedure Summary     Date: 12/15/20 Room / Location: [facility] 09 / SURGERY [facility]    Anesthesia Start: 1027 Anesthesia Stop: 1135    Procedures:       CREATION, ILEOSTOMY - FOR CLOSURE OF DIVERTING LOOP ILEOSTOMY (Abdomen)      INSERTION, CATHETER/Medi Port removal (Right Chest) Diagnosis: (ILEOSOTOMY IN PLACE, Medi Port in place)    Surgeons: Casey Aquino M.D. Responsible Provider: Abhi Peguero M.D.    Anesthesia Type: general ASA Status: 2          Final Anesthesia Type: general  Last vitals  BP   Blood Pressure : 113/73    Temp   36.4 °C (97.5 °F)    Pulse   Pulse: (!) 55   Resp   18    SpO2   99 %      Anesthesia Post Evaluation    Patient location during evaluation: PACU  Patient participation: complete - patient participated  Level of consciousness: awake and alert    Airway patency: patent  Anesthetic complications: no  Cardiovascular status: hemodynamically stable  Respiratory status: acceptable  Hydration status: euvolemic    PONV: none           Nurse Pain Score: 0 (NPRS)

## 2020-12-15 NOTE — ANESTHESIA PREPROCEDURE EVALUATION
Past Medical History:   Diagnosis Date   • Cancer (HCC) 02/04/2020    rectal cancer   • Cataract 2019    bilat iol   • Hypertension    • Snoring          Relevant Problems   No relevant active problems       Physical Exam    Airway   Mallampati: II  TM distance: >3 FB  Neck ROM: full       Cardiovascular - normal exam  Rhythm: regular  Rate: normal  (-) murmur     Dental - normal exam           Pulmonary - normal exam  Breath sounds clear to auscultation     Abdominal    Neurological - normal exam                 Anesthesia Plan    ASA 2       Plan - general       Airway plan will be ETT        Induction: intravenous    Postoperative Plan: Postoperative administration of opioids is intended.    Pertinent diagnostic labs and testing reviewed    Informed Consent:    Anesthetic plan and risks discussed with patient.

## 2020-12-15 NOTE — ANESTHESIA PROCEDURE NOTES
Airway    Date/Time: 12/15/2020 10:31 AM  Performed by: Abhi Peguero M.D.  Authorized by: Abhi Peguero M.D.     Location:  OR  Urgency:  Elective  Difficult Airway: No    Indications for Airway Management:  Anesthesia      Spontaneous Ventilation: absent    Sedation Level:  Deep  Preoxygenated: Yes    Patient Position:  Sniffing  Mask Difficulty Assessment:  1 - vent by mask  Final Airway Type:  Endotracheal airway  Final Endotracheal Airway:  ETT  Cuffed: Yes    Technique Used for Successful ETT Placement:  Direct laryngoscopy    Insertion Site:  Oral  Blade Type:  Navi  Laryngoscope Blade/Videolaryngoscope Blade Size:  3  ETT Size (mm):  7.5  Measured from:  Teeth  ETT to Teeth (cm):  23  Placement Verified by: auscultation and capnometry    Cormack-Lehane Classification:  Grade I - full view of glottis  Number of Attempts at Approach:  1

## 2020-12-15 NOTE — ANESTHESIA TIME REPORT
Anesthesia Start and Stop Event Times     Date Time Event    12/15/2020 1007 Ready for Procedure     1027 Anesthesia Start     1135 Anesthesia Stop        Responsible Staff  12/15/20    Name Role Begin End    Abhi Peguero M.D. Anesth 1027 1135        Preop Diagnosis (Free Text):  Pre-op Diagnosis     ILEOSOTOMY IN PLACE, Medi Port in place        Preop Diagnosis (Codes):    Post op Diagnosis  Ileostomy in place (HCC)      Premium Reason  Non-Premium    Comments:

## 2020-12-15 NOTE — OR NURSING
The pt is awake and oriented. Respirations are regular and easy. Pain is controlled, the pt is comfortable. Dressing dry and intact.    Juice taken. Oxymask on. Pt will be ambulated.

## 2020-12-15 NOTE — OR NURSING
The pt was dangeled then stood at bedside. C/O dizzyness-back to [facility].    The pt ate 2 pudding cups.

## 2020-12-16 VITALS
OXYGEN SATURATION: 96 % | HEIGHT: 69 IN | RESPIRATION RATE: 18 BRPM | HEART RATE: 67 BPM | DIASTOLIC BLOOD PRESSURE: 75 MMHG | SYSTOLIC BLOOD PRESSURE: 132 MMHG | TEMPERATURE: 97.8 F | BODY MASS INDEX: 29.52 KG/M2 | WEIGHT: 199.3 LBS

## 2020-12-16 LAB
ANION GAP SERPL CALC-SCNC: 12 MMOL/L (ref 7–16)
BASOPHILS # BLD AUTO: 0.1 % (ref 0–1.8)
BASOPHILS # BLD: 0.01 K/UL (ref 0–0.12)
BUN SERPL-MCNC: 27 MG/DL (ref 8–22)
CALCIUM SERPL-MCNC: 9.5 MG/DL (ref 8.5–10.5)
CHLORIDE SERPL-SCNC: 98 MMOL/L (ref 96–112)
CO2 SERPL-SCNC: 24 MMOL/L (ref 20–33)
CREAT SERPL-MCNC: 1.19 MG/DL (ref 0.5–1.4)
EOSINOPHIL # BLD AUTO: 0 K/UL (ref 0–0.51)
EOSINOPHIL NFR BLD: 0 % (ref 0–6.9)
ERYTHROCYTE [DISTWIDTH] IN BLOOD BY AUTOMATED COUNT: 51.9 FL (ref 35.9–50)
GLUCOSE SERPL-MCNC: 135 MG/DL (ref 65–99)
HCT VFR BLD AUTO: 41.5 % (ref 42–52)
HGB BLD-MCNC: 13.1 G/DL (ref 14–18)
IMM GRANULOCYTES # BLD AUTO: 0.04 K/UL (ref 0–0.11)
IMM GRANULOCYTES NFR BLD AUTO: 0.4 % (ref 0–0.9)
LYMPHOCYTES # BLD AUTO: 0.87 K/UL (ref 1–4.8)
LYMPHOCYTES NFR BLD: 8.4 % (ref 22–41)
MCH RBC QN AUTO: 33.4 PG (ref 27–33)
MCHC RBC AUTO-ENTMCNC: 31.6 G/DL (ref 33.7–35.3)
MCV RBC AUTO: 105.9 FL (ref 81.4–97.8)
MONOCYTES # BLD AUTO: 0.95 K/UL (ref 0–0.85)
MONOCYTES NFR BLD AUTO: 9.2 % (ref 0–13.4)
NEUTROPHILS # BLD AUTO: 8.51 K/UL (ref 1.82–7.42)
NEUTROPHILS NFR BLD: 81.9 % (ref 44–72)
NRBC # BLD AUTO: 0 K/UL
NRBC BLD-RTO: 0 /100 WBC
PLATELET # BLD AUTO: 145 K/UL (ref 164–446)
PMV BLD AUTO: 10 FL (ref 9–12.9)
POTASSIUM SERPL-SCNC: 4.5 MMOL/L (ref 3.6–5.5)
RBC # BLD AUTO: 3.92 M/UL (ref 4.7–6.1)
SODIUM SERPL-SCNC: 134 MMOL/L (ref 135–145)
WBC # BLD AUTO: 10.4 K/UL (ref 4.8–10.8)

## 2020-12-16 PROCEDURE — 80048 BASIC METABOLIC PNL TOTAL CA: CPT

## 2020-12-16 PROCEDURE — A9270 NON-COVERED ITEM OR SERVICE: HCPCS | Performed by: SURGERY

## 2020-12-16 PROCEDURE — 700102 HCHG RX REV CODE 250 W/ 637 OVERRIDE(OP): Performed by: SURGERY

## 2020-12-16 PROCEDURE — 85025 COMPLETE CBC W/AUTO DIFF WBC: CPT

## 2020-12-16 PROCEDURE — 700111 HCHG RX REV CODE 636 W/ 250 OVERRIDE (IP): Performed by: SURGERY

## 2020-12-16 PROCEDURE — 36415 COLL VENOUS BLD VENIPUNCTURE: CPT

## 2020-12-16 RX ADMIN — ENOXAPARIN SODIUM 40 MG: 40 INJECTION SUBCUTANEOUS at 05:50

## 2020-12-16 RX ADMIN — HYDROXYZINE HYDROCHLORIDE 25 MG: 25 TABLET, FILM COATED ORAL at 05:51

## 2020-12-16 RX ADMIN — ACETAMINOPHEN 1000 MG: 500 TABLET ORAL at 05:51

## 2020-12-16 RX ADMIN — CELECOXIB 200 MG: 200 CAPSULE ORAL at 05:51

## 2020-12-16 RX ADMIN — GABAPENTIN 200 MG: 100 CAPSULE ORAL at 05:51

## 2020-12-16 ASSESSMENT — PAIN DESCRIPTION - PAIN TYPE: TYPE: ACUTE PAIN

## 2020-12-16 NOTE — PROGRESS NOTES
Pt arrived to T407-2 from PACU   Pt ambulated from wheelchair and is sitting on chair   On 10L oxygen mask per ERAS, to stop at 1830   LR 50 ml/h infusing, to stop at 1830   Incision over (R) chest noted, old port site, with dermabond in place   RLQ incision with scant drainage noted, otherwise CDI

## 2020-12-16 NOTE — OR NURSING
The pt is awake and oriented. Respirations are regular and easy. Pain is controlled, the pt is comfortable. Dressing dry and intact.    Family updated by text

## 2020-12-16 NOTE — DISCHARGE SUMMARY
Discharge Summary    CHIEF COMPLAINT ON ADMISSION  History of robotic-assisted laparoscopic low anterior resection with creation of a diverting loop ileostomy and desires ileostomy reversal.    Reason for Admission  ILEOSOTOMY IN PLACE     Admission Date  12/15/2020    CODE STATUS  Full Code    HPI & HOSPITAL COURSE  This is a 70 y.o. male here with history of diverting loop ileostomy creation after low anterior resection without evidence of contraindications to reversal after flexible sigmoidoscopy and barium enema.  He was transferred to the floor he recovered quickly.  At time of discharge was emanating without assistance, tolerating a diet with good pain control and bowel function.  He declined narcotic prescriptions.  He was given a postoperative appointment on 12/28/2020. He was given all of his postoperative instructions with him.  Discharge planning.     Therefore, he is discharged in good and stable condition to home with close outpatient follow-up.    The patient recovered much more quickly than anticipated on admission.    Discharge Date  12/16/2020    FOLLOW UP ITEMS POST DISCHARGE  pathology    DISCHARGE DIAGNOSES  Active Problems:    * No active hospital problems. *  Resolved Problems:    * No resolved hospital problems. *      FOLLOW UP  No future appointments.  Casey Aquino M.D.  [address] [place] [id number]  [phone]    In 1 week        MEDICATIONS ON DISCHARGE     Medication List      ASK your doctor about these medications      Instructions   gabapentin 100 MG Caps  Commonly known as: NEURONTIN   Take 200 mg by mouth 3 times a day.  Dose: 200 mg     losartan 100 MG Tabs  Commonly known as: COZAAR   Take  by mouth every evening. Take 1 tablet by mouth every day     Vistaril 25 MG Caps  Generic drug: hydrOXYzine pamoate   Take 25 mg by mouth every day.  Dose: 25 mg            Allergies  No Known Allergies    DIET  Orders Placed This Encounter   Procedures   • Diet Order  Diet: Regular     Standing Status:   Standing     Number of Occurrences:   1     Order Specific Question:   ERAS     Answer:   Yes     Order Specific Question:   Diet:     Answer:   Regular [1]       ACTIVITY  As tolerated.  Exercise encouraged.  10-lb lifting restriction    CONSULTATIONS  pathology    PROCEDURES  Ileostomy closure with resection    LABORATORY  Lab Results   Component Value Date    SODIUM 134 (L) 12/16/2020    POTASSIUM 4.5 12/16/2020    CHLORIDE 98 12/16/2020    CO2 24 12/16/2020    GLUCOSE 135 (H) 12/16/2020    BUN 27 (H) 12/16/2020    CREATININE 1.19 12/16/2020        Lab Results   Component Value Date    WBC 10.4 12/16/2020    HEMOGLOBIN 13.1 (L) 12/16/2020    HEMATOCRIT 41.5 (L) 12/16/2020    PLATELETCT 145 (L) 12/16/2020        Total time of the discharge process exceeds 25 minutes.

## 2020-12-16 NOTE — PROGRESS NOTES
Patient discharged     IV removed prior to discharge.   Signed prescriptions given to patient.  Discharge education provided, all questions answered.   Verbalized understanding of discharge education.   Wheeled out with all personal belongings collected from room.

## 2020-12-16 NOTE — OR NURSING
To room in wheel chair    Pt transported to floor with oxygen The oxygen tank is greater than 50% full upon inspection.

## 2020-12-16 NOTE — DISCHARGE INSTRUCTIONS
Discharge Instructions    Discharged to home by car with relative. Discharged via wheelchair, hospital escort: Yes.  Special equipment needed: Not Applicable    Be sure to schedule a follow-up appointment with your primary care doctor or any specialists as instructed.     Discharge Plan:   Diet Plan: Discussed  Activity Level: Discussed  Confirmed Follow up Appointment: Patient to Call and Schedule Appointment  Confirmed Symptoms Management: Discussed  Medication Reconciliation Updated: Yes  Influenza Vaccine Indication: Not indicated: Previously immunized this influenza season and > 8 years of age(October 2020)    I understand that a diet low in cholesterol, fat, and sodium is recommended for good health. Unless I have been given specific instructions below for another diet, I accept this instruction as my diet prescription.   Other diet: Regular    Special Instructions: None    · Is patient discharged on Warfarin / Coumadin?   No     Depression / Suicide Risk    As you are discharged from this [facility] facility, it is important to learn how to keep safe from harming yourself.    Recognize the warning signs:  · Abrupt changes in personality, positive or negative- including increase in energy   · Giving away possessions  · Change in eating patterns- significant weight changes-  positive or negative  · Change in sleeping patterns- unable to sleep or sleeping all the time   · Unwillingness or inability to communicate  · Depression  · Unusual sadness, discouragement and loneliness  · Talk of wanting to die  · Neglect of personal appearance   · Rebelliousness- reckless behavior  · Withdrawal from people/activities they love  · Confusion- inability to concentrate     If you or a loved one observes any of these behaviors or has concerns about self-harm, here's what you can do:  · Talk about it- your feelings and reasons for harming yourself  · Remove any means that you might use to hurt yourself (examples: pills,  rope, extension cords, firearm)  · Get professional help from the community (Mental Health, Substance Abuse, psychological counseling)  · Do not be alone:Call your Safe Contact- someone whom you trust who will be there for you.  · Call your local CRISIS HOTLINE 124-9001 or 289-831-8378  · Call your local Children's Mobile Crisis Response Team [place] (160) 700-8334 or www.[employer]  · Call the toll free National Suicide Prevention Hotlines   · National Suicide Prevention Lifeline 793-032-ZIVA (3357)  · National Hope Line Network 800-SUICIDE (817-2887)      Ileostomy Reversal, Care After  This sheet gives you information about how to care for yourself after your procedure. Your health care provider may also give you more specific instructions. If you have problems or questions, contact your health care provider.  What can I expect after the procedure?  After the procedure, it is common to have:  · A small amount of blood or clear fluid coming from your incision.  · Pain and discomfort in your abdomen, especially near your incision.  · Irregular bowel movements for several days.  Follow these instructions at home:  Medicines  · Take over-the-counter and prescription medicines only as told by your health care provider.  · Ask your health care provider if the medicine prescribed to you requires you to avoid driving or using heavy machinery.  Incision care    · Keep your incision area clean and dry.  · Follow instructions from your health care provider about how to take care of your incision. Make sure you:  ? Wash your hands with soap and water before and after you change your bandage (dressing). If soap and water are not available, use hand .  ? Change your dressing as told by your health care provider.  ? Leave stitches (sutures), skin glue, or adhesive strips in place. These skin closures may need to stay in place for 2 weeks or longer. If adhesive strip edges start to loosen and curl up, you  may trim the loose edges. Do not remove adhesive strips completely unless your health care provider tells you to do that.  · Check your incision area every day for signs of infection. Check for:  ? Redness, swelling, or pain.  ? Fluid or blood.  ? Warmth.  ? Pus or a bad smell.  Eating and drinking    · Follow instructions from your health care provider about eating and drinking.  · If you have pain or discomfort after you eat, try eating soft foods until you do not experience these symptoms any more.  · Eat meals and snacks at regular intervals.  · Drink enough fluid to keep your urine pale yellow.  Activity    · Rest as much as possible while you are healing.  · Return to your normal activities as told by your health care provider. Ask your health care provider what activities are safe for you.  · Avoid intense physical activity for as long as you are told by your health care provider.  · Do not lift anything that is heavier than 10 lb (4.5 kg), or the limit that you are told, for 6 weeks or until your health care provider says that it is safe.  General instructions  · Wear compression stockings as told by your health care provider. These stockings help to prevent blood clots and reduce swelling in your legs.  · Do not take baths, swim, or use a hot tub until your health care provider approves. Ask your health care provider if you may take showers. You may only be allowed to take sponge baths.  · Do not use any products that contain nicotine or tobacco, such as cigarettes, e-cigarettes, and chewing tobacco. If you need help quitting, ask your health care provider.  · Keep track of your bowel movements.  · Keep all follow-up visits as told by your health care provider. This is important.  Contact a health care provider if:  · You have redness, swelling, or pain around your incision.  · You have fluid or blood coming from your incision.  · Your incision feels warm to the touch.  · You have pus or a bad smell coming  from your incision.  · You have a fever.  · You have abdominal pain, bloating, pressure, or cramping.  · You are having difficulty with bowel movements, such as:  ? Diarrhea.  ? Constipation.  ? Pain during bowel movements.  · You feel nauseous.  · You vomit.  · You feel dizzy or light-headed.  · You have shortness of breath.  · You have an unusual lack of energy (fatigue).  Get help right away if you:  · Have abdominal pain or cramps that do not go away with medicine or get worse.  · Vomit more than once.  · Have an irregular heartbeat.  · Have chest pain.  Summary  · Take over-the-counter and prescription medicines only as told by your health care provider.  · Keep track of your bowel movements.  · Contact a health care provider if you are having difficulty with bowel movements, such as diarrhea, constipation, or pain during bowel movements.  · Contact a health care provider if you have redness, swelling, or pain around your incision.  · Keep all follow-up visits as told by your health care provider. This is important.  This information is not intended to replace advice given to you by your health care provider. Make sure you discuss any questions you have with your health care provider.  Document Released: 12/06/2012 Document Revised: 08/12/2019 Document Reviewed: 08/13/2019  [employer] Patient Education © 2020 [employer]      Implanted Port Removal, Care After  This sheet gives you information about how to care for yourself after your procedure. Your health care provider may also give you more specific instructions. If you have problems or questions, contact your health care provider.  What can I expect after the procedure?  After the procedure, it is common to have:  · Soreness or pain near your incision.  · Some swelling or bruising near your incision.  Follow these instructions at home:  Medicines  · Take over-the-counter and prescription medicines only as told by your health care provider.  · If you were  prescribed an antibiotic medicine, take it as told by your health care provider. Do not stop taking the antibiotic even if you start to feel better.  Bathing  · Do not take baths, swim, or use a hot tub until your health care provider approves. Ask your health care provider if you can take showers. You may only be allowed to take sponge baths.  Incision care    · Follow instructions from your health care provider about how to take care of your incision. Make sure you:  ? Wash your hands with soap and water before you change your bandage (dressing). If soap and water are not available, use hand .  ? Change your dressing as told by your health care provider.  ? Keep your dressing dry.  ? Leave stitches (sutures), skin glue, or adhesive strips in place. These skin closures may need to stay in place for 2 weeks or longer. If adhesive strip edges start to loosen and curl up, you may trim the loose edges. Do not remove adhesive strips completely unless your health care provider tells you to do that.  · Check your incision area every day for signs of infection. Check for:  ? More redness, swelling, or pain.  ? More fluid or blood.  ? Warmth.  ? Pus or a bad smell.  Driving    · Do not drive for 24 hours if you were given a medicine to help you relax (sedative) during your procedure.  · If you did not receive a sedative, ask your health care provider when it is safe to drive.  Activity  · Return to your normal activities as told by your health care provider. Ask your health care provider what activities are safe for you.  · Do not lift anything that is heavier than 10 lb (4.5 kg), or the limit that you are told, until your health care provider says that it is safe.  · Do not do activities that involve lifting your arms over your head.  General instructions  · Do not use any products that contain nicotine or tobacco, such as cigarettes and e-cigarettes. These can delay healing. If you need help quitting, ask your  health care provider.  · Keep all follow-up visits as told by your health care provider. This is important.  Contact a health care provider if:  · You have more redness, swelling, or pain around your incision.  · You have more fluid or blood coming from your incision.  · Your incision feels warm to the touch.  · You have pus or a bad smell coming from your incision.  · You have pain that is not relieved by your pain medicine.  Get help right away if you have:  · A fever or chills.  · Chest pain.  · Difficulty breathing.  Summary  · After the procedure, it is common to have pain, soreness, swelling, or bruising near your incision.  · If you were prescribed an antibiotic medicine, take it as told by your health care provider. Do not stop taking the antibiotic even if you start to feel better.  · Do not drive for 24 hours if you were given a sedative during your procedure.  · Return to your normal activities as told by your health care provider. Ask your health care provider what activities are safe for you.  This information is not intended to replace advice given to you by your health care provider. Make sure you discuss any questions you have with your health care provider.  Document Released: 11/28/2016 Document Revised: 01/31/2019 Document Reviewed: 01/31/2019  [employer] Patient Education © 2020 [employer]      Incision Care, Adult  An incision is a cut that a doctor makes in your skin for surgery (for a procedure). Most times, these cuts are closed after surgery. Your cut from surgery may be closed with stitches (sutures), staples, skin glue, or skin tape (adhesive strips). You may need to return to your doctor to have stitches or staples taken out. This may happen many days or many weeks after your surgery. The cut needs to be well cared for so it does not get infected.  How to care for your cut  Cut care    · Follow instructions from your doctor about how to take care of your cut. Make sure you:  ? Wash your  hands with soap and water before you change your bandage (dressing). If you cannot use soap and water, use hand .  ? Change your bandage as told by your doctor.  ? Leave stitches, skin glue, or skin tape in place. They may need to stay in place for 2 weeks or longer. If tape strips get loose and curl up, you may trim the loose edges. Do not remove tape strips completely unless your doctor says it is okay.  · Check your cut area every day for signs of infection. Check for:  ? More redness, swelling, or pain.  ? More fluid or blood.  ? Warmth.  ? Pus or a bad smell.  · Ask your doctor how to clean the cut. This may include:  ? Using mild soap and water.  ? Using a clean towel to pat the cut dry after you clean it.  ? Putting a cream or ointment on the cut. Do this only as told by your doctor.  ? Covering the cut with a clean bandage.  · Ask your doctor when you can leave the cut uncovered.  · Do not take baths, swim, or use a hot tub until your doctor says it is okay. Ask your doctor if you can take showers. You may only be allowed to take sponge baths for bathing.  Medicines  · If you were prescribed an antibiotic medicine, cream, or ointment, take the antibiotic or put it on the cut as told by your doctor. Do not stop taking or putting on the antibiotic even if your condition gets better.  · Take over-the-counter and prescription medicines only as told by your doctor.  General instructions  · Limit movement around your cut. This helps healing.  ? Avoid straining, lifting, or exercise for the first month, or for as long as told by your doctor.  ? Follow instructions from your doctor about going back to your normal activities.  ? Ask your doctor what activities are safe.  · Protect your cut from the sun when you are outside for the first 6 months, or for as long as told by your doctor. Put on sunscreen around the scar or cover up the scar.  · Keep all follow-up visits as told by your doctor. This is  important.  Contact a doctor if:  · Your have more redness, swelling, or pain around the cut.  · You have more fluid or blood coming from the cut.  · Your cut feels warm to the touch.  · You have pus or a bad smell coming from the cut.  · You have a fever or shaking chills.  · You feel sick to your stomach (nauseous) or you throw up (vomit).  · You are dizzy.  · Your stitches or staples come undone.  Get help right away if:  · You have a red streak coming from your cut.  · Your cut bleeds through the bandage and the bleeding does not stop with gentle pressure.  · The edges of your cut open up and separate.  · You have very bad (severe) pain.  · You have a rash.  · You are confused.  · You pass out (faint).  · You have trouble breathing and you have a fast heartbeat.  This information is not intended to replace advice given to you by your health care provider. Make sure you discuss any questions you have with your health care provider.  Document Released: 03/11/2013 Document Revised: 05/07/2018 Document Reviewed: 08/25/2017  [employer] Patient Education © 2020 [employer]

## 2020-12-16 NOTE — PROGRESS NOTES
Report received from RN, assumed care at 0700  Pt is A0X4, and responds appropriately   Pt declines any SOB, chest pain, new onset of numbness/ tingiling  Pt rates pain at 0/10, on a scale of 1-10, pt declines pain and pain medication needs at this time   Pt is voiding adequatly and without hesitancy  Pt has + flatus, + bowel sounds, - BM pt reports passing some mucus but no stool yet  Pt ambulates with a steady gait up self   Pt is tolerating a regular diet, pt denies any nausea/vomiting  Pt has a RLQ old ileostomy reversal site, dressing is clean, dry and intact   Pt has right chest port removal site, approximated with derma bond, open to air, no drainage noted at this time         Plan of care discussed, all questions answered. Explained importance of calling before getting OOB and pt verbalizes understanding. Explained importance of oral care. Call light is within reach, treaded slipper socks on, bed in lowest/ locked position, hourly rounding in place, all needs met at this time

## 2020-12-16 NOTE — PROGRESS NOTES
Bedside report received.  Assessment completed.  A&O x 4.   On 0L O2. Denies SOB.  Denies N/V and new numbness or tingling.  Patient ambulates with standby assist. Bed alarm n/a.   Pain managed with prescribed medications.  Tolerating regular diet.  + void, + flatus, last BM pre surgery, reported mucus in toilet.  Surgical incisions: RLQ incision, R upper chest port removal site    Reviewed plan with of care with patient. Call light and personal belongings with in reach. Hourly rounding in place. All needs met at this time.

## 2020-12-18 ENCOUNTER — APPOINTMENT (OUTPATIENT)
Dept: RADIOLOGY | Facility: MEDICAL CENTER | Age: 70
DRG: 862 | End: 2020-12-18
Attending: EMERGENCY MEDICINE
Payer: MEDICARE

## 2020-12-18 ENCOUNTER — HOSPITAL ENCOUNTER (INPATIENT)
Facility: MEDICAL CENTER | Age: 70
LOS: 11 days | DRG: 862 | End: 2020-12-29
Attending: EMERGENCY MEDICINE | Admitting: INTERNAL MEDICINE
Payer: MEDICARE

## 2020-12-18 DIAGNOSIS — L03.311 ABDOMINAL WALL CELLULITIS: ICD-10-CM

## 2020-12-18 DIAGNOSIS — L03.90 CELLULITIS, UNSPECIFIED CELLULITIS SITE: ICD-10-CM

## 2020-12-18 DIAGNOSIS — R18.8 PELVIC FLUID COLLECTION: ICD-10-CM

## 2020-12-18 DIAGNOSIS — L02.91 ABSCESS: ICD-10-CM

## 2020-12-18 PROBLEM — R10.84 GENERALIZED ABDOMINAL PAIN: Status: ACTIVE | Noted: 2020-12-18

## 2020-12-18 PROBLEM — I10 HYPERTENSION: Status: ACTIVE | Noted: 2020-12-18

## 2020-12-18 PROBLEM — K59.00 CN (CONSTIPATION): Status: ACTIVE | Noted: 2020-12-18

## 2020-12-18 PROBLEM — D64.9 ANEMIA: Status: ACTIVE | Noted: 2020-12-18

## 2020-12-18 LAB
ALBUMIN SERPL BCP-MCNC: 4.4 G/DL (ref 3.2–4.9)
ALBUMIN/GLOB SERPL: 1.4 G/DL
ALP SERPL-CCNC: 93 U/L (ref 30–99)
ALT SERPL-CCNC: 21 U/L (ref 2–50)
ANION GAP SERPL CALC-SCNC: 11 MMOL/L (ref 7–16)
AST SERPL-CCNC: 21 U/L (ref 12–45)
BASOPHILS # BLD AUTO: 0.2 % (ref 0–1.8)
BASOPHILS # BLD: 0.02 K/UL (ref 0–0.12)
BILIRUB SERPL-MCNC: 0.8 MG/DL (ref 0.1–1.5)
BUN SERPL-MCNC: 17 MG/DL (ref 8–22)
CALCIUM SERPL-MCNC: 9.9 MG/DL (ref 8.5–10.5)
CHLORIDE SERPL-SCNC: 99 MMOL/L (ref 96–112)
CO2 SERPL-SCNC: 25 MMOL/L (ref 20–33)
COVID ORDER STATUS COVID19: NORMAL
CREAT SERPL-MCNC: 1.09 MG/DL (ref 0.5–1.4)
EOSINOPHIL # BLD AUTO: 0.14 K/UL (ref 0–0.51)
EOSINOPHIL NFR BLD: 1.5 % (ref 0–6.9)
ERYTHROCYTE [DISTWIDTH] IN BLOOD BY AUTOMATED COUNT: 52.5 FL (ref 35.9–50)
GLOBULIN SER CALC-MCNC: 3.1 G/DL (ref 1.9–3.5)
GLUCOSE SERPL-MCNC: 115 MG/DL (ref 65–99)
HCT VFR BLD AUTO: 41.8 % (ref 42–52)
HGB BLD-MCNC: 13.7 G/DL (ref 14–18)
HGB RETIC QN AUTO: 36.3 PG/CELL (ref 29–35)
IMM GRANULOCYTES # BLD AUTO: 0.05 K/UL (ref 0–0.11)
IMM GRANULOCYTES NFR BLD AUTO: 0.5 % (ref 0–0.9)
IMM RETICS NFR: 20.8 % (ref 9.3–17.4)
LIPASE SERPL-CCNC: 34 U/L (ref 11–82)
LYMPHOCYTES # BLD AUTO: 0.61 K/UL (ref 1–4.8)
LYMPHOCYTES NFR BLD: 6.6 % (ref 22–41)
MCH RBC QN AUTO: 34.6 PG (ref 27–33)
MCHC RBC AUTO-ENTMCNC: 32.8 G/DL (ref 33.7–35.3)
MCV RBC AUTO: 105.6 FL (ref 81.4–97.8)
MONOCYTES # BLD AUTO: 0.91 K/UL (ref 0–0.85)
MONOCYTES NFR BLD AUTO: 9.8 % (ref 0–13.4)
NEUTROPHILS # BLD AUTO: 7.55 K/UL (ref 1.82–7.42)
NEUTROPHILS NFR BLD: 81.4 % (ref 44–72)
NRBC # BLD AUTO: 0 K/UL
NRBC BLD-RTO: 0 /100 WBC
PLATELET # BLD AUTO: 137 K/UL (ref 164–446)
PMV BLD AUTO: 9.6 FL (ref 9–12.9)
POTASSIUM SERPL-SCNC: 3.7 MMOL/L (ref 3.6–5.5)
PROT SERPL-MCNC: 7.5 G/DL (ref 6–8.2)
RBC # BLD AUTO: 3.96 M/UL (ref 4.7–6.1)
RETICS # AUTO: 0.1 M/UL (ref 0.04–0.06)
RETICS/RBC NFR: 2.6 % (ref 0.8–2.1)
SODIUM SERPL-SCNC: 135 MMOL/L (ref 135–145)
WBC # BLD AUTO: 9.3 K/UL (ref 4.8–10.8)

## 2020-12-18 PROCEDURE — 82746 ASSAY OF FOLIC ACID SERUM: CPT

## 2020-12-18 PROCEDURE — 700105 HCHG RX REV CODE 258: Performed by: EMERGENCY MEDICINE

## 2020-12-18 PROCEDURE — 82728 ASSAY OF FERRITIN: CPT

## 2020-12-18 PROCEDURE — 700117 HCHG RX CONTRAST REV CODE 255: Performed by: EMERGENCY MEDICINE

## 2020-12-18 PROCEDURE — 85046 RETICYTE/HGB CONCENTRATE: CPT

## 2020-12-18 PROCEDURE — 99233 SBSQ HOSP IP/OBS HIGH 50: CPT | Mod: AI | Performed by: INTERNAL MEDICINE

## 2020-12-18 PROCEDURE — 83690 ASSAY OF LIPASE: CPT

## 2020-12-18 PROCEDURE — 87040 BLOOD CULTURE FOR BACTERIA: CPT

## 2020-12-18 PROCEDURE — 82607 VITAMIN B-12: CPT

## 2020-12-18 PROCEDURE — 770006 HCHG ROOM/CARE - MED/SURG/GYN SEMI*

## 2020-12-18 PROCEDURE — 700111 HCHG RX REV CODE 636 W/ 250 OVERRIDE (IP): Performed by: EMERGENCY MEDICINE

## 2020-12-18 PROCEDURE — 96365 THER/PROPH/DIAG IV INF INIT: CPT

## 2020-12-18 PROCEDURE — 99285 EMERGENCY DEPT VISIT HI MDM: CPT

## 2020-12-18 PROCEDURE — 85025 COMPLETE CBC W/AUTO DIFF WBC: CPT

## 2020-12-18 PROCEDURE — 83550 IRON BINDING TEST: CPT

## 2020-12-18 PROCEDURE — C9803 HOPD COVID-19 SPEC COLLECT: HCPCS | Performed by: EMERGENCY MEDICINE

## 2020-12-18 PROCEDURE — U0003 INFECTIOUS AGENT DETECTION BY NUCLEIC ACID (DNA OR RNA); SEVERE ACUTE RESPIRATORY SYNDROME CORONAVIRUS 2 (SARS-COV-2) (CORONAVIRUS DISEASE [COVID-19]), AMPLIFIED PROBE TECHNIQUE, MAKING USE OF HIGH THROUGHPUT TECHNOLOGIES AS DESCRIBED BY CMS-2020-01-R: HCPCS

## 2020-12-18 PROCEDURE — 74177 CT ABD & PELVIS W/CONTRAST: CPT

## 2020-12-18 PROCEDURE — 80053 COMPREHEN METABOLIC PANEL: CPT

## 2020-12-18 PROCEDURE — 83540 ASSAY OF IRON: CPT

## 2020-12-18 RX ORDER — MORPHINE SULFATE 4 MG/ML
4 INJECTION, SOLUTION INTRAMUSCULAR; INTRAVENOUS ONCE
Status: COMPLETED | OUTPATIENT
Start: 2020-12-19 | End: 2020-12-19

## 2020-12-18 RX ORDER — GABAPENTIN 100 MG/1
200 CAPSULE ORAL 3 TIMES DAILY
Status: DISCONTINUED | OUTPATIENT
Start: 2020-12-19 | End: 2020-12-22

## 2020-12-18 RX ORDER — SODIUM CHLORIDE 9 MG/ML
1000 INJECTION, SOLUTION INTRAVENOUS ONCE
Status: COMPLETED | OUTPATIENT
Start: 2020-12-18 | End: 2020-12-19

## 2020-12-18 RX ORDER — SODIUM CHLORIDE 9 MG/ML
INJECTION, SOLUTION INTRAVENOUS CONTINUOUS
Status: DISCONTINUED | OUTPATIENT
Start: 2020-12-19 | End: 2020-12-25

## 2020-12-18 RX ORDER — AMOXICILLIN 250 MG
2 CAPSULE ORAL 2 TIMES DAILY
Status: DISCONTINUED | OUTPATIENT
Start: 2020-12-19 | End: 2020-12-18

## 2020-12-18 RX ORDER — BISACODYL 10 MG
10 SUPPOSITORY, RECTAL RECTAL
Status: DISCONTINUED | OUTPATIENT
Start: 2020-12-18 | End: 2020-12-18

## 2020-12-18 RX ORDER — KETOROLAC TROMETHAMINE 30 MG/ML
15 INJECTION, SOLUTION INTRAMUSCULAR; INTRAVENOUS EVERY 6 HOURS PRN
Status: DISCONTINUED | OUTPATIENT
Start: 2020-12-18 | End: 2020-12-20

## 2020-12-18 RX ORDER — POLYETHYLENE GLYCOL 3350 17 G/17G
1 POWDER, FOR SOLUTION ORAL
Status: DISCONTINUED | OUTPATIENT
Start: 2020-12-18 | End: 2020-12-21

## 2020-12-18 RX ORDER — LOSARTAN POTASSIUM 50 MG/1
25 TABLET ORAL EVERY EVENING
Status: DISCONTINUED | OUTPATIENT
Start: 2020-12-19 | End: 2020-12-22

## 2020-12-18 RX ORDER — ONDANSETRON 2 MG/ML
4 INJECTION INTRAMUSCULAR; INTRAVENOUS ONCE
Status: COMPLETED | OUTPATIENT
Start: 2020-12-19 | End: 2020-12-19

## 2020-12-18 RX ORDER — HEPARIN SODIUM 5000 [USP'U]/ML
5000 INJECTION, SOLUTION INTRAVENOUS; SUBCUTANEOUS EVERY 8 HOURS
Status: DISCONTINUED | OUTPATIENT
Start: 2020-12-19 | End: 2020-12-24

## 2020-12-18 RX ORDER — ACETAMINOPHEN 325 MG/1
650 TABLET ORAL EVERY 6 HOURS PRN
Status: DISCONTINUED | OUTPATIENT
Start: 2020-12-18 | End: 2020-12-29 | Stop reason: HOSPADM

## 2020-12-18 RX ADMIN — SODIUM CHLORIDE 1000 ML: 9 INJECTION, SOLUTION INTRAVENOUS at 20:50

## 2020-12-18 RX ADMIN — PIPERACILLIN AND TAZOBACTAM 4.5 G: 4; .5 INJECTION, POWDER, LYOPHILIZED, FOR SOLUTION INTRAVENOUS; PARENTERAL at 20:56

## 2020-12-18 RX ADMIN — IOHEXOL 100 ML: 350 INJECTION, SOLUTION INTRAVENOUS at 21:30

## 2020-12-18 ASSESSMENT — ENCOUNTER SYMPTOMS
BRUISES/BLEEDS EASILY: 0
DOUBLE VISION: 0
HEADACHES: 0
WEIGHT LOSS: 0
COUGH: 0
DEPRESSION: 0
DIZZINESS: 0
NAUSEA: 0
SORE THROAT: 0
VOMITING: 0
FEVER: 0
NECK PAIN: 0
BLURRED VISION: 0
INSOMNIA: 0
ABDOMINAL PAIN: 1
STRIDOR: 0
MYALGIAS: 0
PALPITATIONS: 0
CONSTIPATION: 1
HEMOPTYSIS: 0
DIARRHEA: 1

## 2020-12-19 PROBLEM — D53.9 MACROCYTIC ANEMIA: Status: ACTIVE | Noted: 2020-12-18

## 2020-12-19 PROBLEM — A41.9 SEPSIS (HCC): Status: ACTIVE | Noted: 2020-12-19

## 2020-12-19 PROBLEM — E87.6 HYPOKALEMIA: Status: ACTIVE | Noted: 2020-12-19

## 2020-12-19 LAB
ANION GAP SERPL CALC-SCNC: 7 MMOL/L (ref 7–16)
APPEARANCE UR: CLEAR
BASOPHILS # BLD AUTO: 0.1 % (ref 0–1.8)
BASOPHILS # BLD: 0.01 K/UL (ref 0–0.12)
BILIRUB UR QL STRIP.AUTO: NEGATIVE
BUN SERPL-MCNC: 14 MG/DL (ref 8–22)
CALCIUM SERPL-MCNC: 7.6 MG/DL (ref 8.5–10.5)
CHLORIDE SERPL-SCNC: 105 MMOL/L (ref 96–112)
CO2 SERPL-SCNC: 22 MMOL/L (ref 20–33)
COLOR UR: YELLOW
CREAT SERPL-MCNC: 0.97 MG/DL (ref 0.5–1.4)
EOSINOPHIL # BLD AUTO: 0.15 K/UL (ref 0–0.51)
EOSINOPHIL NFR BLD: 2.1 % (ref 0–6.9)
ERYTHROCYTE [DISTWIDTH] IN BLOOD BY AUTOMATED COUNT: 52.5 FL (ref 35.9–50)
FERRITIN SERPL-MCNC: 277 NG/ML (ref 22–322)
FOLATE SERPL-MCNC: 20.1 NG/ML
GLUCOSE SERPL-MCNC: 106 MG/DL (ref 65–99)
GLUCOSE UR STRIP.AUTO-MCNC: NEGATIVE MG/DL
HCT VFR BLD AUTO: 34.9 % (ref 42–52)
HGB BLD-MCNC: 11.2 G/DL (ref 14–18)
IMM GRANULOCYTES # BLD AUTO: 0.04 K/UL (ref 0–0.11)
IMM GRANULOCYTES NFR BLD AUTO: 0.6 % (ref 0–0.9)
IRON SATN MFR SERPL: 16 % (ref 15–55)
IRON SERPL-MCNC: 43 UG/DL (ref 50–180)
KETONES UR STRIP.AUTO-MCNC: NEGATIVE MG/DL
LACTATE BLD-SCNC: 1 MMOL/L (ref 0.5–2)
LACTATE BLD-SCNC: 1.5 MMOL/L (ref 0.5–2)
LACTATE BLD-SCNC: 1.6 MMOL/L (ref 0.5–2)
LEUKOCYTE ESTERASE UR QL STRIP.AUTO: NEGATIVE
LYMPHOCYTES # BLD AUTO: 0.22 K/UL (ref 1–4.8)
LYMPHOCYTES NFR BLD: 3.1 % (ref 22–41)
MCH RBC QN AUTO: 33.8 PG (ref 27–33)
MCHC RBC AUTO-ENTMCNC: 32.1 G/DL (ref 33.7–35.3)
MCV RBC AUTO: 105.4 FL (ref 81.4–97.8)
MICRO URNS: NORMAL
MONOCYTES # BLD AUTO: 0.71 K/UL (ref 0–0.85)
MONOCYTES NFR BLD AUTO: 10.1 % (ref 0–13.4)
NEUTROPHILS # BLD AUTO: 5.89 K/UL (ref 1.82–7.42)
NEUTROPHILS NFR BLD: 84 % (ref 44–72)
NITRITE UR QL STRIP.AUTO: NEGATIVE
NRBC # BLD AUTO: 0 K/UL
NRBC BLD-RTO: 0 /100 WBC
PH UR STRIP.AUTO: 5 [PH] (ref 5–8)
PLATELET # BLD AUTO: 99 K/UL (ref 164–446)
PMV BLD AUTO: 9.7 FL (ref 9–12.9)
POTASSIUM SERPL-SCNC: 3.2 MMOL/L (ref 3.6–5.5)
PROT UR QL STRIP: NEGATIVE MG/DL
RBC # BLD AUTO: 3.31 M/UL (ref 4.7–6.1)
RBC UR QL AUTO: NEGATIVE
SARS-COV-2 RNA RESP QL NAA+PROBE: NOTDETECTED
SODIUM SERPL-SCNC: 134 MMOL/L (ref 135–145)
SP GR UR REFRACTOMETRY: 1.04
SPECIMEN SOURCE: NORMAL
TIBC SERPL-MCNC: 266 UG/DL (ref 250–450)
UIBC SERPL-MCNC: 223 UG/DL (ref 110–370)
UROBILINOGEN UR STRIP.AUTO-MCNC: 0.2 MG/DL
VIT B12 SERPL-MCNC: 495 PG/ML (ref 211–911)
WBC # BLD AUTO: 7 K/UL (ref 4.8–10.8)

## 2020-12-19 PROCEDURE — 96372 THER/PROPH/DIAG INJ SC/IM: CPT

## 2020-12-19 PROCEDURE — 83605 ASSAY OF LACTIC ACID: CPT

## 2020-12-19 PROCEDURE — 700111 HCHG RX REV CODE 636 W/ 250 OVERRIDE (IP): Performed by: INTERNAL MEDICINE

## 2020-12-19 PROCEDURE — 80048 BASIC METABOLIC PNL TOTAL CA: CPT

## 2020-12-19 PROCEDURE — 700105 HCHG RX REV CODE 258: Performed by: NURSE PRACTITIONER

## 2020-12-19 PROCEDURE — 700105 HCHG RX REV CODE 258: Performed by: INTERNAL MEDICINE

## 2020-12-19 PROCEDURE — 700102 HCHG RX REV CODE 250 W/ 637 OVERRIDE(OP): Performed by: INTERNAL MEDICINE

## 2020-12-19 PROCEDURE — 36415 COLL VENOUS BLD VENIPUNCTURE: CPT

## 2020-12-19 PROCEDURE — 770006 HCHG ROOM/CARE - MED/SURG/GYN SEMI*

## 2020-12-19 PROCEDURE — 700111 HCHG RX REV CODE 636 W/ 250 OVERRIDE (IP): Performed by: NURSE PRACTITIONER

## 2020-12-19 PROCEDURE — A9270 NON-COVERED ITEM OR SERVICE: HCPCS | Performed by: INTERNAL MEDICINE

## 2020-12-19 PROCEDURE — 99233 SBSQ HOSP IP/OBS HIGH 50: CPT | Performed by: STUDENT IN AN ORGANIZED HEALTH CARE EDUCATION/TRAINING PROGRAM

## 2020-12-19 PROCEDURE — 85025 COMPLETE CBC W/AUTO DIFF WBC: CPT

## 2020-12-19 PROCEDURE — 81003 URINALYSIS AUTO W/O SCOPE: CPT

## 2020-12-19 PROCEDURE — 700111 HCHG RX REV CODE 636 W/ 250 OVERRIDE (IP): Performed by: EMERGENCY MEDICINE

## 2020-12-19 PROCEDURE — 700105 HCHG RX REV CODE 258: Performed by: EMERGENCY MEDICINE

## 2020-12-19 PROCEDURE — 96375 TX/PRO/DX INJ NEW DRUG ADDON: CPT

## 2020-12-19 PROCEDURE — 96366 THER/PROPH/DIAG IV INF ADDON: CPT

## 2020-12-19 RX ORDER — POTASSIUM CHLORIDE 20 MEQ/1
20 TABLET, EXTENDED RELEASE ORAL ONCE
Status: ACTIVE | OUTPATIENT
Start: 2020-12-19 | End: 2020-12-20

## 2020-12-19 RX ORDER — METRONIDAZOLE 500 MG/1
1000 TABLET ORAL 3 TIMES DAILY
Status: ON HOLD | COMMUNITY
Start: 2020-12-01 | End: 2020-12-29

## 2020-12-19 RX ORDER — MORPHINE SULFATE 4 MG/ML
1 INJECTION, SOLUTION INTRAMUSCULAR; INTRAVENOUS EVERY 4 HOURS PRN
Status: DISCONTINUED | OUTPATIENT
Start: 2020-12-19 | End: 2020-12-29 | Stop reason: HOSPADM

## 2020-12-19 RX ORDER — NEOMYCIN SULFATE 500 MG/1
500 TABLET ORAL 3 TIMES DAILY
Status: ON HOLD | COMMUNITY
Start: 2020-12-01 | End: 2020-12-29

## 2020-12-19 RX ADMIN — MORPHINE SULFATE 4 MG: 4 INJECTION INTRAVENOUS at 01:36

## 2020-12-19 RX ADMIN — ONDANSETRON 4 MG: 2 INJECTION INTRAMUSCULAR; INTRAVENOUS at 01:35

## 2020-12-19 RX ADMIN — SODIUM CHLORIDE 1000 ML: 9 INJECTION, SOLUTION INTRAVENOUS at 08:02

## 2020-12-19 RX ADMIN — ACETAMINOPHEN 650 MG: 325 TABLET, FILM COATED ORAL at 16:00

## 2020-12-19 RX ADMIN — HEPARIN SODIUM 5000 UNITS: 5000 INJECTION, SOLUTION INTRAVENOUS; SUBCUTANEOUS at 10:02

## 2020-12-19 RX ADMIN — HEPARIN SODIUM 5000 UNITS: 5000 INJECTION, SOLUTION INTRAVENOUS; SUBCUTANEOUS at 01:41

## 2020-12-19 RX ADMIN — GABAPENTIN 200 MG: 100 CAPSULE ORAL at 17:48

## 2020-12-19 RX ADMIN — GABAPENTIN 200 MG: 100 CAPSULE ORAL at 05:55

## 2020-12-19 RX ADMIN — VANCOMYCIN HYDROCHLORIDE 2250 MG: 500 INJECTION, POWDER, LYOPHILIZED, FOR SOLUTION INTRAVENOUS at 12:53

## 2020-12-19 RX ADMIN — PIPERACILLIN AND TAZOBACTAM 4.5 G: 4; .5 INJECTION, POWDER, LYOPHILIZED, FOR SOLUTION INTRAVENOUS; PARENTERAL at 10:02

## 2020-12-19 RX ADMIN — PIPERACILLIN AND TAZOBACTAM 4.5 G: 4; .5 INJECTION, POWDER, LYOPHILIZED, FOR SOLUTION INTRAVENOUS; PARENTERAL at 01:45

## 2020-12-19 RX ADMIN — KETOROLAC TROMETHAMINE 15 MG: 30 INJECTION, SOLUTION INTRAMUSCULAR at 18:28

## 2020-12-19 RX ADMIN — MORPHINE SULFATE 1 MG: 4 INJECTION INTRAVENOUS at 21:03

## 2020-12-19 RX ADMIN — LOSARTAN POTASSIUM 25 MG: 50 TABLET, FILM COATED ORAL at 17:48

## 2020-12-19 RX ADMIN — PIPERACILLIN AND TAZOBACTAM 4.5 G: 4; .5 INJECTION, POWDER, LYOPHILIZED, FOR SOLUTION INTRAVENOUS; PARENTERAL at 17:48

## 2020-12-19 RX ADMIN — SODIUM CHLORIDE: 9 INJECTION, SOLUTION INTRAVENOUS at 19:52

## 2020-12-19 ASSESSMENT — LIFESTYLE VARIABLES
EVER FELT BAD OR GUILTY ABOUT YOUR DRINKING: NO
DOES PATIENT WANT TO STOP DRINKING: NO
HOW MANY TIMES IN THE PAST YEAR HAVE YOU HAD 5 OR MORE DRINKS IN A DAY: 12
HAVE YOU EVER FELT YOU SHOULD CUT DOWN ON YOUR DRINKING: NO
EVER HAD A DRINK FIRST THING IN THE MORNING TO STEADY YOUR NERVES TO GET RID OF A HANGOVER: NO
AVERAGE NUMBER OF DAYS PER WEEK YOU HAVE A DRINK CONTAINING ALCOHOL: 7
ALCOHOL_USE: YES
HAVE PEOPLE ANNOYED YOU BY CRITICIZING YOUR DRINKING: NO
CONSUMPTION TOTAL: POSITIVE
TOTAL SCORE: 0
ON A TYPICAL DAY WHEN YOU DRINK ALCOHOL HOW MANY DRINKS DO YOU HAVE: 3

## 2020-12-19 ASSESSMENT — PATIENT HEALTH QUESTIONNAIRE - PHQ9
2. FEELING DOWN, DEPRESSED, IRRITABLE, OR HOPELESS: NOT AT ALL
1. LITTLE INTEREST OR PLEASURE IN DOING THINGS: NOT AT ALL
SUM OF ALL RESPONSES TO PHQ9 QUESTIONS 1 AND 2: 0

## 2020-12-19 ASSESSMENT — ENCOUNTER SYMPTOMS
HEADACHES: 0
FOCAL WEAKNESS: 0
FEVER: 1
WHEEZING: 0
NECK PAIN: 0
FLANK PAIN: 0
ABDOMINAL PAIN: 1
BLOOD IN STOOL: 0
CONSTIPATION: 1
VOMITING: 0
PALPITATIONS: 0
DIZZINESS: 0
SHORTNESS OF BREATH: 0
CHILLS: 1
COUGH: 0
BACK PAIN: 1
WEAKNESS: 0
NAUSEA: 1

## 2020-12-19 ASSESSMENT — COGNITIVE AND FUNCTIONAL STATUS - GENERAL
SUGGESTED CMS G CODE MODIFIER DAILY ACTIVITY: CH
SUGGESTED CMS G CODE MODIFIER MOBILITY: CH
DAILY ACTIVITIY SCORE: 24
MOBILITY SCORE: 24

## 2020-12-19 ASSESSMENT — PAIN DESCRIPTION - PAIN TYPE
TYPE: ACUTE PAIN
TYPE: ACUTE PAIN;SURGICAL PAIN
TYPE: ACUTE PAIN

## 2020-12-19 NOTE — PROGRESS NOTES
Attending Hospitalist today is Kanwal ADAMSON starting at 0700. Please call this Physician for orders, updates and questions.

## 2020-12-19 NOTE — ED PROVIDER NOTES
CHIEF COMPLAINT  Chief Complaint   Patient presents with   • Post-Op Complications       HPI  Casey Arroyo is a 70 y.o. male here for evaluation of abdominal distention and pain.  Patient recently had a resection and diverting loop ileostomy done with reversal.  This was done on 15 December, patient did not have any issues since that time.  He then went home and was doing fine.  Over the last couple of days has developed some abdominal distention, and discomfort.  States it is in the anterior abdomen, does not go to the back, and he has no associated chest pain or shortness of breath.  He states he has had some redness around the front of the abdomen, notes earlier today, and when we looked at this area again tonight he had increasing redness extending past his umbilicus down into the left.  Patient states he has had fevers of 102 as well, earlier today.  He was last tested for Covid 1 week ago.  He has no other medical concerns at this time.      ROS;  Please see HPI  O/W negative     PAST MEDICAL HISTORY   has a past medical history of Cancer (HCC) (02/04/2020), Cataract (2019), Hypertension, and Snoring.    SOCIAL HISTORY  Social History     Tobacco Use   • Smoking status: Never Smoker   • Smokeless tobacco: Never Used   Substance and Sexual Activity   • Alcohol use: Yes     Frequency: 4 or more times a week     Drinks per session: 3 or 4     Comment: 3/day   • Drug use: Never   • Sexual activity: Not on file       SURGICAL HISTORY   has a past surgical history that includes colonoscopy (02/04/2020); appendectomy (1966); colonoscopy,diagnostic (2/19/2020); colonoscopy flex w/endo us (2/19/2020); cataract extraction with iol (Bilateral); ileostomy (2020); other (2020); ileostomy (12/15/2020); and cath placement (Right, 12/15/2020).    CURRENT MEDICATIONS  Home Medications    **Home medications have not yet been reviewed for this encounter**         ALLERGIES  No Known Allergies    REVIEW OF SYSTEMS  See HPI  "for further details. Review of systems as above, otherwise all other systems are negative.     PHYSICAL EXAM  VITAL SIGNS: /72   Pulse 96   Temp 37.6 °C (99.7 °F) (Oral)   Resp (!) 23   Ht 1.753 m (5' 9\")   Wt 93.1 kg (205 lb 4 oz)   SpO2 93%   BMI 30.31 kg/m²     Constitutional: Well developed, well nourished. No acute distress.  HEENT: Normocephalic, atraumatic. MMM  Neck: Supple, Full range of motion   Chest/Pulmonary:  No respiratory distress.  Equal expansion   Abdomen; mildly distended, dressing in place, surrounding erythema extending around the dressing and now moving towards the midline past the umbilicus.  Warmth noted.  Musculoskeletal: No deformity, no edema, neurovascular intact.   Neuro: Clear speech, appropriate, cooperative, cranial nerves II-XII grossly intact.  Psych: Normal mood and affect  Skin; erythema noted periumbilically, warm to touch.  No crepitus.      Results for orders placed or performed during the hospital encounter of 12/18/20   CBC WITH DIFFERENTIAL   Result Value Ref Range    WBC 9.3 4.8 - 10.8 K/uL    RBC 3.96 (L) 4.70 - 6.10 M/uL    Hemoglobin 13.7 (L) 14.0 - 18.0 g/dL    Hematocrit 41.8 (L) 42.0 - 52.0 %    .6 (H) 81.4 - 97.8 fL    MCH 34.6 (H) 27.0 - 33.0 pg    MCHC 32.8 (L) 33.7 - 35.3 g/dL    RDW 52.5 (H) 35.9 - 50.0 fL    Platelet Count 137 (L) 164 - 446 K/uL    MPV 9.6 9.0 - 12.9 fL    Neutrophils-Polys 81.40 (H) 44.00 - 72.00 %    Lymphocytes 6.60 (L) 22.00 - 41.00 %    Monocytes 9.80 0.00 - 13.40 %    Eosinophils 1.50 0.00 - 6.90 %    Basophils 0.20 0.00 - 1.80 %    Immature Granulocytes 0.50 0.00 - 0.90 %    Nucleated RBC 0.00 /100 WBC    Neutrophils (Absolute) 7.55 (H) 1.82 - 7.42 K/uL    Lymphs (Absolute) 0.61 (L) 1.00 - 4.80 K/uL    Monos (Absolute) 0.91 (H) 0.00 - 0.85 K/uL    Eos (Absolute) 0.14 0.00 - 0.51 K/uL    Baso (Absolute) 0.02 0.00 - 0.12 K/uL    Immature Granulocytes (abs) 0.05 0.00 - 0.11 K/uL    NRBC (Absolute) 0.00 K/uL   COMP " METABOLIC PANEL   Result Value Ref Range    Sodium 135 135 - 145 mmol/L    Potassium 3.7 3.6 - 5.5 mmol/L    Chloride 99 96 - 112 mmol/L    Co2 25 20 - 33 mmol/L    Anion Gap 11.0 7.0 - 16.0    Glucose 115 (H) 65 - 99 mg/dL    Bun 17 8 - 22 mg/dL    Creatinine 1.09 0.50 - 1.40 mg/dL    Calcium 9.9 8.5 - 10.5 mg/dL    AST(SGOT) 21 12 - 45 U/L    ALT(SGPT) 21 2 - 50 U/L    Alkaline Phosphatase 93 30 - 99 U/L    Total Bilirubin 0.8 0.1 - 1.5 mg/dL    Albumin 4.4 3.2 - 4.9 g/dL    Total Protein 7.5 6.0 - 8.2 g/dL    Globulin 3.1 1.9 - 3.5 g/dL    A-G Ratio 1.4 g/dL   LIPASE   Result Value Ref Range    Lipase 34 11 - 82 U/L   URINALYSIS,CULTURE IF INDICATED    Specimen: Urine, Clean Catch   Result Value Ref Range    Color Yellow     Character Clear     Ph 5.0 5.0 - 8.0    Glucose Negative Negative mg/dL    Ketones Negative Negative mg/dL    Protein Negative Negative mg/dL    Bilirubin Negative Negative    Urobilinogen, Urine 0.2 Negative    Nitrite Negative Negative    Leukocyte Esterase Negative Negative    Occult Blood Negative Negative    Micro Urine Req see below    COVID/SARS CoV-2 PCR    Specimen: Nasopharyngeal; Respirate   Result Value Ref Range    COVID Order Status Received    ESTIMATED GFR   Result Value Ref Range    GFR If African American >60 >60 mL/min/1.73 m 2    GFR If Non African American >60 >60 mL/min/1.73 m 2   IRON/TOTAL IRON BIND   Result Value Ref Range    Iron 43 (L) 50 - 180 ug/dL    Total Iron Binding 266 250 - 450 ug/dL    Unsat Iron Binding 223 110 - 370 ug/dL    % Saturation 16 15 - 55 %   VITAMIN B12   Result Value Ref Range    Vitamin B12 -True Cobalamin 495 211 - 911 pg/mL   FOLATE   Result Value Ref Range    Folate -Folic Acid 20.1 >4.0 ng/mL   FERRITIN   Result Value Ref Range    Ferritin 277.0 22.0 - 322.0 ng/mL   RETICULOCYTES COUNT   Result Value Ref Range    Reticulocyte Count 2.6 (H) 0.8 - 2.1 %    Retic, Absolute 0.10 (H) 0.04 - 0.06 M/uL    Imm. Reticulocyte Fraction 20.8 (H) 9.3  - 17.4 %    Retic Hgb Equivalent 36.3 (H) 29.0 - 35.0 pg/cell   REFRACTOMETER SG   Result Value Ref Range    Specific Gravity 1.037      CT-ABDOMEN-PELVIS WITH   Final Result         1.  Presacral mildly enhancing fluid collection, appearance suggests abscess.   2.  Large quantity of stool in the cecum favors changes of constipation. Component of evolving Natalia's or ileus not excluded. Recommend radiographic follow-up to resolution   3.  Bladder wall thickening, consider changes of cystitis, correlate with urinalysis   4.  Bilateral fat-containing inguinal hernias   5.  Small layering left pleural effusion   6.  Fat-containing umbilical hernia   7.  Atherosclerosis and atherosclerotic coronary artery disease.          PROCEDURES     MEDICAL RECORD  I have reviewed patient's medical record and pertinent results are listed.    COURSE & MEDICAL DECISION MAKING  I have reviewed any medical record information, laboratory studies and radiographic results as noted above.    10:49 PM  I spoke to Dr. Sparks, on for Dr. Aquino. He will consult, but would like the hospitalist to admit the pt for iv abx. He would do iv abx first, before IR consult for drain.     the pt will be admitted to the hospitalist   IV abx have been started, and he is comfortable.      FINAL IMPRESSION  Cellulitis   Abscess       Electronically signed by: Van Lacey D.O., 12/18/2020 9:22 PM

## 2020-12-19 NOTE — ED NOTES
Report given to Elida QUIÑONEZ RN. Upon shift change pt is resting in bed with even and unlabored breaths, in no apparent distress.

## 2020-12-19 NOTE — PROGRESS NOTES
"Pt Casey Arroyo admitted to room T407-2 via transport in bed from ED at 1200.  Pt Blood Pressure 135/67   Pulse (Abnormal) 104   Temperature (Abnormal) 38.3 °C (101 °F) (Temporal)   Respiration 20   Height 1.753 m (5' 9\")   Weight 93.1 kg (205 lb 4 oz)   Oxygen Saturation 95%   Body Mass Index 30.31 kg/m²    .vs pain reported at 5 on a scale of 0-10. Oriented to room call light and smoking policy.  Reviewed plan of care (equipment, incentive spirometer, sequential compression devices, medications, activity, diet, fall precautions, skin care, and pain) with patient and family. Welcome packet given and reviewed with patient , all questions answered. Education provided on oral hygiene program.     "

## 2020-12-19 NOTE — PROGRESS NOTES
"Pharmacy Kinetics 70 y.o. male on vancomycin day # 1 2020    New start. Received loading dose of 2250 mg at 1300  Provider specified end date: TBD    Indication for Treatment: IAI    Pertinent history per medical record: Admitted on 2020 for abdominal pain and fevers. Patient with a significant hx of colorectal cancer s/p hemicolectomy, post op day 3 for ostomy reversal. Now presents with abdominal distention and pain. CT positive for abdominal wall cellulitis and presacral abscess. Surgery consulted.     Other antibiotics: Zosyn 4.5 g IV q8hr    Allergies: Patient has no known allergies.     List concerns for renal function: body habitus, age    Pertinent cultures to date:    blood cultures preliminary no growth    MRSA nares swab if pneumonia is a concern (ordered/positive/negative/n-a): n/a    Recent Labs     20  0549   WBC 9.3 7.0   NEUTSPOLYS 81.40* 84.00*     Recent Labs     20  0549   BUN 17 14   CREATININE 1.09 0.97   ALBUMIN 4.4  --      No results for input(s): VANCOTROUGH, VANCOPEAK, VANCORANDOM in the last 72 hours.    Intake/Output Summary (Last 24 hours) at 2020 1241  Last data filed at 2020 0556  Gross per 24 hour   Intake 200 ml   Output --   Net 200 ml      /67   Pulse (!) 104   Temp (!) 38.3 °C (101 °F) (Temporal)   Resp 20   Ht 1.753 m (5' 9\")   Wt 93.1 kg (205 lb 4 oz)   SpO2 95%  Temp (24hrs), Av °C (100.4 °F), Min:37.6 °C (99.7 °F), Max:38.3 °C (101 °F)      A/P   1. Vancomycin dose change: 1500 mg IV q12hr  2. Next vancomycin level: 2-3 days (not ordered)  3. Goal trough: 10-15 mcg/ml  4. Comments: Patient started on empiric abx for IAI with presacral abscess. Blood cultures currently in process. Patient has been febrile and tachycardic in ED otherwise HD stable with no leukocytosis. CT noted as having abdominal wall cellulitis. No hx of vanco dosing at facility to assess clearance. Concerns for accumulation " given age and body habitus however, do anticipate that patient will clear vanco adequately. Patient to be initiated on vanco q12 dosing with level in 2-3 days or sooner if clinically indicated. Will follow and adjust dosing based on levels and cultures.    He Horan, RickyD

## 2020-12-19 NOTE — CONSULTS
General Surgery Consult    Date of Service: 12/19/2020    Consulting Physician: Fred Sparks M.D. [facility]    -------------------------------------------------------------------------------------------------    Chief complaint: postoperative cellulitis and distention    HPI: This is a 70 y.o. male who underwent reversal of a loop ileostomy by Dr. Aquino on 12/15 and was discharged in good condition on 12/16.  He developed increasing abdominal distention and discomfort on 12/18 as well as a fever to 101.4 and came to ER for evaluation.  His WBC is normal but he has erythema around his ostomy closure site and CT showed a 3.5cm presacral fluid collection with mild enhancement concerning for abscess.  He is alert and conversant and says his erythema is improving and his distention is better. No N/V. Positive flatus and liquid bowel movements. Does not appear toxic.      Past Medical History:   Diagnosis Date   • Cancer (HCC) 02/04/2020    rectal cancer   • Cataract 2019    bilat iol   • Hypertension    • Snoring        Past Surgical History:   Procedure Laterality Date   • ILEOSTOMY  12/15/2020    Procedure: CREATION, ILEOSTOMY - FOR CLOSURE OF DIVERTING LOOP ILEOSTOMY;  Surgeon: Casey Aquino M.D.;  Location: [facility];  Service: General   • CATH PLACEMENT Right 12/15/2020    Procedure: INSERTION, CATHETER/Medi Port removal;  Surgeon: Casey Aquino M.D.;  Location: [facility];  Service: General   • PB COLONOSCOPY,DIAGNOSTIC  2/19/2020    Procedure: COLONOSCOPY;  Surgeon: Carlyle Gonsalez M.D.;  Location: [facility];  Service: Gastroenterology   • COLONOSCOPY FLEX W/ENDO US  2/19/2020    Procedure: COLONOSCOPY, FLEXIBLE, WITH ENDOSCOPIC US-LOWER RADIAL;  Surgeon: Carlyle Gonsalez M.D.;  Location: [facility];  Service: Gastroenterology   • COLONOSCOPY  02/04/2020   • ILEOSTOMY  2020   • OTHER  2020    port placement R chest   • APPENDECTOMY  1966    • CATARACT EXTRACTION WITH IOL Bilateral        Current Facility-Administered Medications   Medication Dose Route Frequency Provider Last Rate Last Admin   • piperacillin-tazobactam (ZOSYN) 4.5 g in  mL IVPB  4.5 g Intravenous Q8HR Giuseppe Koo M.D. 25 mL/hr at 12/19/20 1002 4.5 g at 12/19/20 1002   • potassium chloride SA (Kdur) tablet 20 mEq  20 mEq Oral Once Kanwal ESE RiveraNull, A.P.R.N.       • morphine (pf) 4 mg/mL injection 1 mg  1 mg Intravenous Q4HRS PRN Kanwal ESE RiveraNull, A.P.R.N.       • MD Alert...Vancomycin per Pharmacy   Other PHARMACY TO DOSE Kanwal ESE RiveraNull, A.P.R.N.       • vancomycin (VANCOCIN) 2,250 mg in  mL IVPB  25 mg/kg Intravenous Once Kanwal ESE RiveraNull, A.P.R.N.       • ketorolac (TORADOL) injection 15 mg  15 mg Intravenous Q6HRS PRN Giuseppe Koo M.D.       • gabapentin (NEURONTIN) capsule 200 mg  200 mg Oral TID Giuseppe Koo M.D.   200 mg at 12/19/20 0555   • losartan (COZAAR) tablet 25 mg  25 mg Oral Q EVENING Giuseppe Koo M.D.       • polyethylene glycol/lytes (MIRALAX) PACKET 1 Packet  1 Packet Oral QDAY PRN Giuseppe Koo M.D.        And   • magnesium hydroxide (MILK OF MAGNESIA) suspension 30 mL  30 mL Oral QDAY PRN Giuseppe Koo M.D.       • NS infusion   Intravenous Continuous Giuseppe Koo M.D.       • heparin injection 5,000 Units  5,000 Units Subcutaneous Q8HRS Giuseppe Koo M.D.   5,000 Units at 12/19/20 1002   • acetaminophen (Tylenol) tablet 650 mg  650 mg Oral Q6HRS PRN Giuseppe Koo M.D.           Social History     Socioeconomic History   • Marital status:      Spouse name: Not on file   • Number of children: Not on file   • Years of education: Not on file   • Highest education level: Not on file   Occupational History   • Not on file   Social Needs   • Financial resource strain: Not on file   • Food insecurity     Worry: Not on file     Inability: Not on file   • Transportation needs     Medical: Not on file     Non-medical: Not  "on file   Tobacco Use   • Smoking status: Never Smoker   • Smokeless tobacco: Never Used   Substance and Sexual Activity   • Alcohol use: Yes     Frequency: 4 or more times a week     Drinks per session: 3 or 4     Comment: 3/day   • Drug use: Never   • Sexual activity: Not on file   Lifestyle   • Physical activity     Days per week: Not on file     Minutes per session: Not on file   • Stress: Not on file   Relationships   • Social connections     Talks on phone: Not on file     Gets together: Not on file     Attends Jain service: Not on file     Active member of club or organization: Not on file     Attends meetings of clubs or organizations: Not on file     Relationship status: Not on file   • Intimate partner violence     Fear of current or ex partner: Not on file     Emotionally abused: Not on file     Physically abused: Not on file     Forced sexual activity: Not on file   Other Topics Concern   • Not on file   Social History Narrative   • Not on file       No family history on file.    Allergies:  Patient has no known allergies.    Review of Systems:  Noncontributory except as per HPI      Physical Exam:  /67   Pulse (!) 104   Temp (!) 38.3 °C (101 °F) (Temporal)   Resp 20   Ht 1.753 m (5' 9\")   Wt 93.1 kg (205 lb 4 oz)   SpO2 95%     Constitutional: Alert, oriented, no acute distress  HEENT:  Normocephalic and atraumatic, EOMI  Neck:   Supple, no JVD,   Cardiovascular: Regular rate and rhythm,   Pulmonary:  Good air entry bilaterally,   Abdominal:  Soft,  Moderately distended     minimally tender to palpation     No rebound/guarding     Erythema for 8-10 cm around the ostomy closure site, I removed two middle staples and probed the wound and released a 3-4cc hematoma with no purulence, packed with gauze.  Musculoskeletal: No edema, no tenderness  Neurological:  CN II-XII grossly intact, no focal deficits  Psychiatric:  Normal mood and affect.    Labs:  Recent Labs     12/18/20 2028 " 12/19/20  0549   WBC 9.3 7.0   RBC 3.96* 3.31*   HEMOGLOBIN 13.7* 11.2*   HEMATOCRIT 41.8* 34.9*   .6* 105.4*   MCH 34.6* 33.8*   MCHC 32.8* 32.1*   RDW 52.5* 52.5*   PLATELETCT 137* 99*   MPV 9.6 9.7     Recent Labs     12/18/20 2028 12/19/20  0549   SODIUM 135 134*   POTASSIUM 3.7 3.2*   CHLORIDE 99 105   CO2 25 22   GLUCOSE 115* 106*   BUN 17 14   CREATININE 1.09 0.97   CALCIUM 9.9 7.6*         Recent Labs     12/18/20 2028   ASTSGOT 21   ALTSGPT 21   TBILIRUBIN 0.8   ALKPHOSPHAT 93   GLOBULIN 3.1       Radiology:  CT:  1.  Presacral mildly enhancing fluid collection, appearance suggests abscess.  2.  Large quantity of stool in the cecum favors changes of constipation. Component of evolving Natalia's or ileus not excluded. Recommend radiographic follow-up to resolution  3.  Bladder wall thickening, consider changes of cystitis, correlate with urinalysis  4.  Bilateral fat-containing inguinal hernias  5.  Small layering left pleural effusion  6.  Fat-containing umbilical hernia  7.  Atherosclerosis and atherosclerotic coronary artery disease.    Assessment:  -  Cellulitis of surgical incision  -  Pelvic fluid collection, possible abscess  -  Postoperative fever  -  History of colorectal cancer  -  Hypertension      Postoperative fever and cellulitis. Will treat with IVF and IV antibiotics. Once vitals normalize and abdominal distention improves we can transition to oral ABx and discharge home, likely 1-2 days but maybe more. If his symptoms persist will need to reimage the pelvis to evaluate for progression of the fluid collection/abscess.    Appreciate hospitalist service's support managing  Caitlins    Fred Sparks M.D.  [facility]  [phone]  Cell: [phone]

## 2020-12-19 NOTE — ED NOTES
Received report from DEYSI López. At this time pt is resting in bed with even and unlabored breaths, in no apparent distress. Will continue to monitor.

## 2020-12-19 NOTE — ED NOTES
Pt sleeping in bed in no apparent distress. Pt's breaths are even and unlabored at this time. Will continue to monitor pt while awaiting bed assignment upstairs.

## 2020-12-19 NOTE — ASSESSMENT & PLAN NOTE
Secondary to abd cellulitis and/or recent post-op  Continue Tylenol; toradol switched to percocet due to CRISTHIAN  IV morphine for severe,breakthrough pain

## 2020-12-19 NOTE — ASSESSMENT & PLAN NOTE
This is Sepsis Present on admission  SIRS criteria identified on admission include: Fever, with temperature greater than 101 deg F and Tachypnea, with respirations greater than 20 per minute  Source is Cellulitis, Abscess  Sepsis protocol initiated  Fluid resuscitation ordered per protocol  IV antibiotics as appropriate for source of sepsis  While organ dysfunction may be noted elsewhere in this problem list or in the chart, degree of organ dysfunction does not meet CMS criteria for severe sepsis

## 2020-12-19 NOTE — PROGRESS NOTES
Hospital Medicine Daily Progress Note    Date of Service  12/19/2020    Chief Complaint  70 y.o. male admitted 12/18/2020 with abdominal pain and fevers     Hospital Course  70 y.o. male who presented 12/18/2020 with history of hypertension and colorectal cancer status post hemicolectomy, postop day 3 for ostomy reversal presents with abdominal distention and pain. CT reveals abdominal wall cellulitis, cecal constipation and presacral abscess. Surgery was consulted and recommended Zosyn.    Interval Problem Update  12/19: Patient reports abdominal pain improved to 7/10 this morning. Abdominal wall erythema decreased . He reports his last BM was this morning. K 3.2, replaced PO. Blood cx are NGTD. VSS on room air and afebrile.     Consultants/Specialty  General Surgery     Code Status  Full Code    Disposition  Continue inpatient     Review of Systems  Review of Systems   Constitutional: Positive for chills, fever and malaise/fatigue.   Respiratory: Negative for cough, shortness of breath and wheezing.    Cardiovascular: Negative for chest pain and palpitations.   Gastrointestinal: Positive for abdominal pain, constipation and nausea. Negative for blood in stool, melena and vomiting.   Genitourinary: Positive for frequency. Negative for flank pain and hematuria.   Musculoskeletal: Positive for back pain. Negative for joint pain and neck pain.   Neurological: Negative for dizziness, focal weakness, weakness and headaches.        Physical Exam  Temp:  [37.6 °C (99.7 °F)] 37.6 °C (99.7 °F)  Pulse:  [] 92  Resp:  [14-30] 30  BP: (117-158)/(58-87) 134/63  SpO2:  [91 %-100 %] 98 %    Physical Exam  Vitals signs and nursing note reviewed.   Constitutional:       General: He is not in acute distress.     Appearance: He is not toxic-appearing.   HENT:      Head: Normocephalic.      Mouth/Throat:      Mouth: Mucous membranes are moist.      Pharynx: Oropharynx is clear.   Eyes:      Pupils: Pupils are equal, round, and  reactive to light.   Neck:      Musculoskeletal: Normal range of motion.   Cardiovascular:      Rate and Rhythm: Normal rate.      Heart sounds: Normal heart sounds.   Pulmonary:      Effort: Pulmonary effort is normal. No respiratory distress.      Breath sounds: Normal breath sounds. No wheezing or rales.   Abdominal:      General: Bowel sounds are normal. There is distension.      Palpations: Abdomen is rigid.      Tenderness: There is abdominal tenderness in the right upper quadrant and right lower quadrant. There is guarding. There is no rebound.      Hernia: A hernia is present. Hernia is present in the umbilical area.      Comments: Erythema to right side of abdomen improving. Incision covered with dressing which is c/d/i    Musculoskeletal:      Right lower leg: No edema.      Left lower leg: No edema.   Skin:     General: Skin is warm and dry.   Neurological:      Mental Status: He is alert and oriented to person, place, and time. Mental status is at baseline.      Sensory: No sensory deficit.      Motor: No weakness.      Coordination: Coordination normal.   Psychiatric:         Thought Content: Thought content normal.         Judgment: Judgment normal.         Fluids    Intake/Output Summary (Last 24 hours) at 12/19/2020 0935  Last data filed at 12/19/2020 0556  Gross per 24 hour   Intake 200 ml   Output --   Net 200 ml       Laboratory  Recent Labs     12/18/20 2028 12/19/20  0549   WBC 9.3 7.0   RBC 3.96* 3.31*   HEMOGLOBIN 13.7* 11.2*   HEMATOCRIT 41.8* 34.9*   .6* 105.4*   MCH 34.6* 33.8*   MCHC 32.8* 32.1*   RDW 52.5* 52.5*   PLATELETCT 137* 99*   MPV 9.6 9.7     Recent Labs     12/18/20 2028 12/19/20  0549   SODIUM 135 134*   POTASSIUM 3.7 3.2*   CHLORIDE 99 105   CO2 25 22   GLUCOSE 115* 106*   BUN 17 14   CREATININE 1.09 0.97   CALCIUM 9.9 7.6*                   Imaging  CT-ABDOMEN-PELVIS WITH   Final Result         1.  Presacral mildly enhancing fluid collection, appearance suggests  abscess.   2.  Large quantity of stool in the cecum favors changes of constipation. Component of evolving [place]'s or ileus not excluded. Recommend radiographic follow-up to resolution   3.  Bladder wall thickening, consider changes of cystitis, correlate with urinalysis   4.  Bilateral fat-containing inguinal hernias   5.  Small layering left pleural effusion   6.  Fat-containing umbilical hernia   7.  Atherosclerosis and atherosclerotic coronary artery disease.           Assessment/Plan  * Abdominal wall cellulitis  Assessment & Plan  General surgery consulted by ERP, recommended Zosyn  Borders of cellulitis outlined   Follow CBC and cx     Hypokalemia  Assessment & Plan  Mild   Repleted PO   Follow BMP     Macrocytic anemia  Assessment & Plan  May be related to recent surgery  Iron studies normal   H/H stable   Will check b12 and folate   Follow CBC     Hypertension  Assessment & Plan  Outpatient Cozaar    CN (constipation)  Assessment & Plan  He does report last BM this morning   Advance diet as tolerated     Generalized abdominal pain  Assessment & Plan  Secondary to #1  Continue Tylenol and Toradol as needed  IV morphine for severe,breakthrough pain        VTE prophylaxis: Heparin

## 2020-12-19 NOTE — H&P
Hospital Medicine History & Physical Note    Date of Service  12/18/2020    Primary Care Physician  Isidro Maldonado M.D.    Consultants  General surgery Dr. Aquino    Code Status  Full Code    Chief Complaint  Chief Complaint   Patient presents with   • Post-Op Complications       History of Presenting Illness  70 y.o. male who presented 12/18/2020 with history of hypertension and colorectal cancer status post hemicolectomy, postop day 3 for ostomy reversal presents with abdominal distention and pain.  CT reveals abdominal wall cellulitis, cecal constipation and presacral abscess.    Surgery consulted recommending Zosyn.        Review of Systems  Review of Systems   Constitutional: Negative for fever, malaise/fatigue and weight loss.   HENT: Negative for sore throat and tinnitus.    Eyes: Negative for blurred vision and double vision.   Respiratory: Negative for cough, hemoptysis and stridor.    Cardiovascular: Negative for chest pain and palpitations.   Gastrointestinal: Positive for abdominal pain, constipation and diarrhea. Negative for nausea and vomiting.   Genitourinary: Negative for dysuria and urgency.   Musculoskeletal: Negative for myalgias and neck pain.   Skin: Negative for itching and rash.   Neurological: Negative for dizziness and headaches.   Endo/Heme/Allergies: Does not bruise/bleed easily.   Psychiatric/Behavioral: Negative for depression. The patient does not have insomnia.        Past Medical History   has a past medical history of Cancer (HCC) (02/04/2020), Cataract (2019), Hypertension, and Snoring.    Surgical History   has a past surgical history that includes colonoscopy (02/04/2020); appendectomy (1966); pr colonoscopy,diagnostic (2/19/2020); colonoscopy flex w/endo us (2/19/2020); cataract extraction with iol (Bilateral); ileostomy (2020); other (2020); ileostomy (12/15/2020); and cath placement (Right, 12/15/2020).     Family History  family history is not on file.     Social History    reports that he has never smoked. He has never used smokeless tobacco. He reports current alcohol use. He reports that he does not use drugs.    Allergies  No Known Allergies    Medications  Prior to Admission Medications   Prescriptions Last Dose Informant Patient Reported? Taking?   gabapentin (NEURONTIN) 100 MG Cap  Patient Yes No   Sig: Take 200 mg by mouth 3 times a day.   hydrOXYzine pamoate (VISTARIL) 25 MG Cap  Patient Yes No   Sig: Take 25 mg by mouth every day.   losartan (COZAAR) 100 MG Tab  Patient Yes No   Sig: Take  by mouth every evening. Take 1 tablet by mouth every day      Facility-Administered Medications: None       Physical Exam  Temp:  [37.6 °C (99.7 °F)] 37.6 °C (99.7 °F)  Pulse:  [] 96  Resp:  [20-23] 22  BP: (131-158)/(62-87) 131/62  SpO2:  [92 %-93 %] 93 %    Physical Exam    Laboratory:  Recent Labs     12/16/20  0631 12/18/20 2028   WBC 10.4 9.3   RBC 3.92* 3.96*   HEMOGLOBIN 13.1* 13.7*   HEMATOCRIT 41.5* 41.8*   .9* 105.6*   MCH 33.4* 34.6*   MCHC 31.6* 32.8*   RDW 51.9* 52.5*   PLATELETCT 145* 137*   MPV 10.0 9.6     Recent Labs     12/16/20  0631 12/18/20 2028   SODIUM 134* 135   POTASSIUM 4.5 3.7   CHLORIDE 98 99   CO2 24 25   GLUCOSE 135* 115*   BUN 27* 17   CREATININE 1.19 1.09   CALCIUM 9.5 9.9     Recent Labs     12/16/20  0631 12/18/20 2028   ALTSGPT  --  21   ASTSGOT  --  21   ALKPHOSPHAT  --  93   TBILIRUBIN  --  0.8   LIPASE  --  34   GLUCOSE 135* 115*         No results for input(s): NTPROBNP in the last 72 hours.      No results for input(s): TROPONINT in the last 72 hours.    Imaging:  CT-ABDOMEN-PELVIS WITH   Final Result         1.  Presacral mildly enhancing fluid collection, appearance suggests abscess.   2.  Large quantity of stool in the cecum favors changes of constipation. Component of evolving [place]'s or ileus not excluded. Recommend radiographic follow-up to resolution   3.  Bladder wall thickening, consider changes of cystitis, correlate with  urinalysis   4.  Bilateral fat-containing inguinal hernias   5.  Small layering left pleural effusion   6.  Fat-containing umbilical hernia   7.  Atherosclerosis and atherosclerotic coronary artery disease.            Assessment/Plan:  I anticipate this patient will require at least two midnights for appropriate medical management, necessitating inpatient admission.      Abdominal wall cellulitis  Assessment & Plan  Discussed with general surgery recommending Zosyn,   follow-up CBC, blood culture, outlined borders of cellulitis and general surgery consult    Anemia  Assessment & Plan  Likely secondary to recent surgery.  Follow-up anemia labs    Hypertension  Assessment & Plan  Outpatient Cozaar, labetalol as needed    CN (constipation)  Assessment & Plan  Clear liquid diet otherwise defer to surgery    Generalized abdominal pain  Assessment & Plan  Secondary to #1, Tylenol and Toradol as needed

## 2020-12-19 NOTE — DISCHARGE PLANNING
PC to wife. Pt lives with wife. He is normally independent with ADLs and IADLs. He was still working. However, pt always says he is fine as per wife even when he wasn't fine. Wife is worried that pt ignores symptoms like pain. So wife is agreeable that if pt qualifies for Homehealth then she thinks it would be beneficial for pt to have a nurse to check on the wound every few days, check on vital signs. Explained to wife that we will have to see if he is homebound because that's a qualification by Medicare. Wife agreed.    Pt had been set up with outpt woundcare clinic but it is here in [place] and they live in [place].     Care Transition Team Assessment    Information Source  Information Given By: Spouse  Informant's Name: Kaleb    Readmission Evaluation  Is this a readmission?: Yes - unplanned readmission  Why do you think you were readmitted?: wound is not healing  Was an appointment arranged for you prior to discharge?: Yes, attended appointment  Were there new prescriptions you were supposed to fill after you were discharged?: Yes, prescriptions filled  Did you understand your discharge instructions?: Yes  Did you have enough support after your last discharge?: Yes         Interdisciplinary Discharge Planning  Does Admitting Nurse Feel This Could be a Complex Discharge?: No  Primary Care Physician: Dr. Maldonado  Lives with - Patient's Self Care Capacity: Spouse  Support Systems: Spouse / Significant Other  Housing / Facility: 1 Story House(no steps)  Do You Take your Prescribed Medications Regularly: Yes([facility])  Able to Return to Previous ADL's: Yes  Mobility Issues: No  Prior Services: None, Home-Independent(Woundcare on [address],[facility])  Patient Prefers to be Discharged to:: Home  Assistance Needed: No  Durable Medical Equipment: Not Applicable    Discharge Preparedness  What is your plan after discharge?: Home with help, Other (comment)(outpt clinic)  What are your discharge  supports?: Spouse  Prior Functional Level: Ambulatory, Drives Self, Independent with Activities of Daily Living, Independent with Medication Management  Difficulity with ADLs: None  Difficulity with IADLs: None    Functional Assesment  Prior Functional Level: Ambulatory, Drives Self, Independent with Activities of Daily Living, Independent with Medication Management    Finances  Financial Barriers to Discharge: No  Prescription Coverage: Yes    Vision / Hearing Impairment  Vision Impairment : No  Hearing Impairment : No    Domestic Abuse  Have you ever been the victim of abuse or violence?: No    Discharge Risks or Barriers  Discharge risks or barriers?: No    Anticipated Discharge Information  Discharge Disposition: Still a Patient (30)

## 2020-12-19 NOTE — ED NOTES
Pt called out c/o pain in left abdomen/side. Pt states he isn't sure if he has to go to the bathroom but he wants to try. Pt ambulatory to bathroom without assistance.

## 2020-12-19 NOTE — CARE PLAN
Problem: Safety  Goal: Will remain free from injury  Outcome: PROGRESSING AS EXPECTED   Bed in lowest position, call light within reach, IV pole on same side of bed as bathroom, upper 2 side rails in use, non slip  socks on.    Problem: Bowel/Gastric:  Goal: Normal bowel function is maintained or improved  Outcome: PROGRESSING AS EXPECTED   Assess bowel sounds, any bowel movements, up to ambulate in hallway.

## 2020-12-19 NOTE — ED NOTES
Pt given water per admission orders, tolerated well. Pt also c/o 7/10 pain. Will check orders for PRN pain meds.

## 2020-12-19 NOTE — ED NOTES
Pt medicated for pain per MAR and pain appears to be improved. Pt sleeping in bed with even and unlabored breaths at this time. Will continue to monitor.

## 2020-12-19 NOTE — ED NOTES
Pharmacy Medication Reconciliation    Med rec updated and complete per pt at bedside  Allergies have been verified   Patient home pharmacy:[facility]      Patient reports that he finished a 1 day course of Flagyl & Mycifradin on 12/14/2020 the day before surgery

## 2020-12-19 NOTE — ED TRIAGE NOTES
Intermittent fevers started this morning.  High around 102.  Patient had an ostomy reversal and was discharged 2 days.  Started having diarrhea, last night and passing gas.  Reports increased distention, pain has remained the same since the procedure.  Hx of colon cancer which lead to the ostomy.

## 2020-12-20 ENCOUNTER — APPOINTMENT (OUTPATIENT)
Dept: RADIOLOGY | Facility: MEDICAL CENTER | Age: 70
DRG: 862 | End: 2020-12-20
Attending: STUDENT IN AN ORGANIZED HEALTH CARE EDUCATION/TRAINING PROGRAM
Payer: MEDICARE

## 2020-12-20 PROBLEM — N17.9 AKI (ACUTE KIDNEY INJURY) (HCC): Status: ACTIVE | Noted: 2020-12-20

## 2020-12-20 LAB
ANION GAP SERPL CALC-SCNC: 10 MMOL/L (ref 7–16)
BUN SERPL-MCNC: 21 MG/DL (ref 8–22)
CALCIUM SERPL-MCNC: 8.2 MG/DL (ref 8.5–10.5)
CHLORIDE SERPL-SCNC: 104 MMOL/L (ref 96–112)
CO2 SERPL-SCNC: 22 MMOL/L (ref 20–33)
CREAT SERPL-MCNC: 1.44 MG/DL (ref 0.5–1.4)
CRP SERPL HS-MCNC: 15.8 MG/DL (ref 0–0.75)
ERYTHROCYTE [DISTWIDTH] IN BLOOD BY AUTOMATED COUNT: 51.9 FL (ref 35.9–50)
GLUCOSE SERPL-MCNC: 128 MG/DL (ref 65–99)
HCT VFR BLD AUTO: 33.2 % (ref 42–52)
HGB BLD-MCNC: 10.9 G/DL (ref 14–18)
MCH RBC QN AUTO: 34.5 PG (ref 27–33)
MCHC RBC AUTO-ENTMCNC: 32.8 G/DL (ref 33.7–35.3)
MCV RBC AUTO: 105.1 FL (ref 81.4–97.8)
PLATELET # BLD AUTO: 98 K/UL (ref 164–446)
PMV BLD AUTO: 9.6 FL (ref 9–12.9)
POTASSIUM SERPL-SCNC: 3.4 MMOL/L (ref 3.6–5.5)
RBC # BLD AUTO: 3.16 M/UL (ref 4.7–6.1)
SODIUM SERPL-SCNC: 136 MMOL/L (ref 135–145)
VANCOMYCIN TROUGH SERPL-MCNC: 14.2 UG/ML (ref 10–20)
WBC # BLD AUTO: 7.3 K/UL (ref 4.8–10.8)

## 2020-12-20 PROCEDURE — 700102 HCHG RX REV CODE 250 W/ 637 OVERRIDE(OP): Performed by: STUDENT IN AN ORGANIZED HEALTH CARE EDUCATION/TRAINING PROGRAM

## 2020-12-20 PROCEDURE — 71045 X-RAY EXAM CHEST 1 VIEW: CPT

## 2020-12-20 PROCEDURE — 700105 HCHG RX REV CODE 258: Performed by: STUDENT IN AN ORGANIZED HEALTH CARE EDUCATION/TRAINING PROGRAM

## 2020-12-20 PROCEDURE — 36415 COLL VENOUS BLD VENIPUNCTURE: CPT

## 2020-12-20 PROCEDURE — 80202 ASSAY OF VANCOMYCIN: CPT

## 2020-12-20 PROCEDURE — 700102 HCHG RX REV CODE 250 W/ 637 OVERRIDE(OP): Performed by: INTERNAL MEDICINE

## 2020-12-20 PROCEDURE — 80048 BASIC METABOLIC PNL TOTAL CA: CPT

## 2020-12-20 PROCEDURE — 700111 HCHG RX REV CODE 636 W/ 250 OVERRIDE (IP): Performed by: INTERNAL MEDICINE

## 2020-12-20 PROCEDURE — A9270 NON-COVERED ITEM OR SERVICE: HCPCS | Performed by: STUDENT IN AN ORGANIZED HEALTH CARE EDUCATION/TRAINING PROGRAM

## 2020-12-20 PROCEDURE — 700105 HCHG RX REV CODE 258: Performed by: NURSE PRACTITIONER

## 2020-12-20 PROCEDURE — 99233 SBSQ HOSP IP/OBS HIGH 50: CPT | Performed by: STUDENT IN AN ORGANIZED HEALTH CARE EDUCATION/TRAINING PROGRAM

## 2020-12-20 PROCEDURE — A9270 NON-COVERED ITEM OR SERVICE: HCPCS | Performed by: INTERNAL MEDICINE

## 2020-12-20 PROCEDURE — 700111 HCHG RX REV CODE 636 W/ 250 OVERRIDE (IP): Performed by: STUDENT IN AN ORGANIZED HEALTH CARE EDUCATION/TRAINING PROGRAM

## 2020-12-20 PROCEDURE — 700111 HCHG RX REV CODE 636 W/ 250 OVERRIDE (IP): Performed by: NURSE PRACTITIONER

## 2020-12-20 PROCEDURE — 700105 HCHG RX REV CODE 258: Performed by: INTERNAL MEDICINE

## 2020-12-20 PROCEDURE — 770006 HCHG ROOM/CARE - MED/SURG/GYN SEMI*

## 2020-12-20 PROCEDURE — 86140 C-REACTIVE PROTEIN: CPT

## 2020-12-20 PROCEDURE — 85027 COMPLETE CBC AUTOMATED: CPT

## 2020-12-20 RX ORDER — ONDANSETRON 4 MG/1
4 TABLET, ORALLY DISINTEGRATING ORAL EVERY 4 HOURS PRN
Status: DISCONTINUED | OUTPATIENT
Start: 2020-12-20 | End: 2020-12-29 | Stop reason: HOSPADM

## 2020-12-20 RX ORDER — OXYCODONE HYDROCHLORIDE AND ACETAMINOPHEN 5; 325 MG/1; MG/1
1 TABLET ORAL EVERY 4 HOURS PRN
Status: DISCONTINUED | OUTPATIENT
Start: 2020-12-20 | End: 2020-12-29 | Stop reason: HOSPADM

## 2020-12-20 RX ORDER — POTASSIUM CHLORIDE 20 MEQ/1
40 TABLET, EXTENDED RELEASE ORAL ONCE
Status: COMPLETED | OUTPATIENT
Start: 2020-12-20 | End: 2020-12-20

## 2020-12-20 RX ADMIN — OXYCODONE HYDROCHLORIDE AND ACETAMINOPHEN 1 TABLET: 5; 325 TABLET ORAL at 15:30

## 2020-12-20 RX ADMIN — HEPARIN SODIUM 5000 UNITS: 5000 INJECTION, SOLUTION INTRAVENOUS; SUBCUTANEOUS at 01:12

## 2020-12-20 RX ADMIN — OXYCODONE HYDROCHLORIDE AND ACETAMINOPHEN 1 TABLET: 5; 325 TABLET ORAL at 21:14

## 2020-12-20 RX ADMIN — HEPARIN SODIUM 5000 UNITS: 5000 INJECTION, SOLUTION INTRAVENOUS; SUBCUTANEOUS at 09:38

## 2020-12-20 RX ADMIN — SODIUM CHLORIDE: 9 INJECTION, SOLUTION INTRAVENOUS at 04:17

## 2020-12-20 RX ADMIN — SODIUM CHLORIDE: 9 INJECTION, SOLUTION INTRAVENOUS at 13:02

## 2020-12-20 RX ADMIN — ONDANSETRON 4 MG: 4 TABLET, ORALLY DISINTEGRATING ORAL at 21:14

## 2020-12-20 RX ADMIN — VANCOMYCIN HYDROCHLORIDE 1500 MG: 500 INJECTION, POWDER, LYOPHILIZED, FOR SOLUTION INTRAVENOUS at 01:11

## 2020-12-20 RX ADMIN — PIPERACILLIN AND TAZOBACTAM 4.5 G: 4; .5 INJECTION, POWDER, LYOPHILIZED, FOR SOLUTION INTRAVENOUS; PARENTERAL at 09:39

## 2020-12-20 RX ADMIN — ONDANSETRON 4 MG: 4 TABLET, ORALLY DISINTEGRATING ORAL at 16:10

## 2020-12-20 RX ADMIN — PIPERACILLIN AND TAZOBACTAM 4.5 G: 4; .5 INJECTION, POWDER, LYOPHILIZED, FOR SOLUTION INTRAVENOUS; PARENTERAL at 01:11

## 2020-12-20 RX ADMIN — PIPERACILLIN AND TAZOBACTAM 4.5 G: 4; .5 INJECTION, POWDER, LYOPHILIZED, FOR SOLUTION INTRAVENOUS; PARENTERAL at 17:37

## 2020-12-20 RX ADMIN — GABAPENTIN 200 MG: 100 CAPSULE ORAL at 04:17

## 2020-12-20 RX ADMIN — GABAPENTIN 200 MG: 100 CAPSULE ORAL at 13:01

## 2020-12-20 RX ADMIN — HEPARIN SODIUM 5000 UNITS: 5000 INJECTION, SOLUTION INTRAVENOUS; SUBCUTANEOUS at 17:35

## 2020-12-20 RX ADMIN — POTASSIUM CHLORIDE 40 MEQ: 1500 TABLET, EXTENDED RELEASE ORAL at 14:23

## 2020-12-20 RX ADMIN — SODIUM CHLORIDE: 9 INJECTION, SOLUTION INTRAVENOUS at 22:55

## 2020-12-20 RX ADMIN — GABAPENTIN 200 MG: 100 CAPSULE ORAL at 17:35

## 2020-12-20 ASSESSMENT — ENCOUNTER SYMPTOMS
FOCAL WEAKNESS: 0
FEVER: 1
CONSTIPATION: 1
WEAKNESS: 0
FLANK PAIN: 0
WHEEZING: 0
COUGH: 0
BACK PAIN: 1
VOMITING: 0
HEADACHES: 0
DIZZINESS: 0
PALPITATIONS: 0
NECK PAIN: 0
ABDOMINAL PAIN: 1
SHORTNESS OF BREATH: 0
BLOOD IN STOOL: 0
NAUSEA: 1
CHILLS: 1

## 2020-12-20 ASSESSMENT — PAIN DESCRIPTION - PAIN TYPE
TYPE: ACUTE PAIN

## 2020-12-20 NOTE — PROGRESS NOTES
[facility] Medicine Daily Progress Note    Date of Service  12/20/2020    Chief Complaint  70 y.o. male admitted 12/18/2020 with abdominal pain and fevers     Hospital Course  70 y.o. male who presented 12/18/2020 with history of hypertension and colorectal cancer status post hemicolectomy, postop day 3 (at that time) for ostomy reversal presented with abdominal distention and pain. CT revealed abdominal wall cellulitis, cecal constipation and presacral abscess. Surgery was consulted and recommended Zosyn.     12/19: Patient reported abdominal pain improved to 7/10 in AM Abdominal wall erythema decreased. He reported his last BM was in AM. K 3.2, replaced PO. Blood cx are NGTD. VSS on room air and afebrile.     Interval Problem Update  Vitals reviewed; afebrile.  The rest of the vitals within normal parameters except HR in 100s.     Pain rated around 4 in mid abdomen - no intervention needed in last 12 hours.    At bedside, reports feeling a bit better. He would have loose stools that at times, hard to control. Abdomen is slightly distended but he denies pain. Current plan with IV Abx explained and surgery team follow up.     Labs reviewed   WBC 7.3  Hb 10.3, Plt 98  K 3.4  Cr 0.97 > 1.44   Lactic acid 1      Consultants/Specialty  General Surgery     Code Status  Full Code    Disposition  Continue inpatient     Review of Systems  Review of Systems   Constitutional: Positive for chills, fever and malaise/fatigue.   Respiratory: Negative for cough, shortness of breath and wheezing.    Cardiovascular: Negative for chest pain and palpitations.   Gastrointestinal: Positive for abdominal pain, constipation and nausea. Negative for blood in stool, melena and vomiting.   Genitourinary: Positive for frequency. Negative for flank pain and hematuria.   Musculoskeletal: Positive for back pain. Negative for joint pain and neck pain.   Neurological: Negative for dizziness, focal weakness, weakness and headaches.        Physical  Exam  Temp:  [37 °C (98.6 °F)-38.8 °C (101.9 °F)] 37 °C (98.6 °F)  Pulse:  [] 103  Resp:  [16-20] 18  BP: ()/(50-73) 123/68  SpO2:  [92 %-94 %] 92 %    Physical Exam  Vitals signs and nursing note reviewed.   Constitutional:       General: He is not in acute distress.     Appearance: He is not toxic-appearing.   HENT:      Head: Normocephalic.      Mouth/Throat:      Mouth: Mucous membranes are moist.      Pharynx: Oropharynx is clear.   Eyes:      Pupils: Pupils are equal, round, and reactive to light.   Neck:      Musculoskeletal: Normal range of motion.   Cardiovascular:      Rate and Rhythm: Normal rate.      Heart sounds: Normal heart sounds.   Pulmonary:      Effort: Pulmonary effort is normal. No respiratory distress.      Breath sounds: Normal breath sounds. No wheezing or rales.   Abdominal:      General: Bowel sounds are normal. There is distension.      Palpations: Abdomen is rigid.      Tenderness: There is abdominal tenderness in the right upper quadrant and right lower quadrant. There is guarding. There is no rebound.      Hernia: A hernia is present. Hernia is present in the umbilical area.      Comments: Erythema to right side of abdomen improving. Incision covered with dressing which is c/d/i    Musculoskeletal:      Right lower leg: No edema.      Left lower leg: No edema.   Skin:     General: Skin is warm and dry.   Neurological:      Mental Status: He is alert and oriented to person, place, and time. Mental status is at baseline.      Sensory: No sensory deficit.      Motor: No weakness.      Coordination: Coordination normal.   Psychiatric:         Thought Content: Thought content normal.         Judgment: Judgment normal.         Fluids    Intake/Output Summary (Last 24 hours) at 12/20/2020 1345  Last data filed at 12/20/2020 0400  Gross per 24 hour   Intake 3250 ml   Output --   Net 3250 ml       Laboratory  Recent Labs     12/18/20 2028 12/19/20  0549 12/20/20  0113   WBC 9.3 7.0  7.3   RBC 3.96* 3.31* 3.16*   HEMOGLOBIN 13.7* 11.2* 10.9*   HEMATOCRIT 41.8* 34.9* 33.2*   .6* 105.4* 105.1*   MCH 34.6* 33.8* 34.5*   MCHC 32.8* 32.1* 32.8*   RDW 52.5* 52.5* 51.9*   PLATELETCT 137* 99* 98*   MPV 9.6 9.7 9.6     Recent Labs     12/18/20 2028 12/19/20  0549 12/20/20  0113   SODIUM 135 134* 136   POTASSIUM 3.7 3.2* 3.4*   CHLORIDE 99 105 104   CO2 25 22 22   GLUCOSE 115* 106* 128*   BUN 17 14 21   CREATININE 1.09 0.97 1.44*   CALCIUM 9.9 7.6* 8.2*                   Imaging  CT-ABDOMEN-PELVIS WITH   Final Result         1.  Presacral mildly enhancing fluid collection, appearance suggests abscess.   2.  Large quantity of stool in the cecum favors changes of constipation. Component of evolving [place]'s or ileus not excluded. Recommend radiographic follow-up to resolution   3.  Bladder wall thickening, consider changes of cystitis, correlate with urinalysis   4.  Bilateral fat-containing inguinal hernias   5.  Small layering left pleural effusion   6.  Fat-containing umbilical hernia   7.  Atherosclerosis and atherosclerotic coronary artery disease.           Assessment/Plan  * Abdominal wall cellulitis  Assessment & Plan  General surgery consulted by ERP, recommended Zosyn  Borders of cellulitis outlined - appeared decreasing erythema  Follow CBC and cx     CRISTHIAN (acute kidney injury) (HCC)- (present on admission)  Assessment & Plan  ?in the setting of active infection vs medication induced  Hold vanco and losartan  Switched ketorolac to percocet prn for pain  C/w IVF NS  Will monitor    Sepsis (HCC)  Assessment & Plan  This is Sepsis Present on admission  SIRS criteria identified on admission include: Fever, with temperature greater than 101 deg F and Tachypnea, with respirations greater than 20 per minute  Source is abdomen   Sepsis protocol initiated  Fluid resuscitation ordered per protocol  IV antibiotics as appropriate for source of sepsis  While organ dysfunction may be noted elsewhere in  this problem list or in the chart, degree of organ dysfunction does not meet CMS criteria for severe sepsis          Hypokalemia  Assessment & Plan  Mild   Repleted PO   Follow BMP     Macrocytic anemia  Assessment & Plan  May be related to recent surgery  Iron studies normal   H/H stable   Will check b12 and folate   Follow CBC     Hypertension  Assessment & Plan  With normal BP and CRISTHIAN, will hold losartan for now    CN (constipation)  Assessment & Plan  He does report multiple loose BM   Advance diet as tolerated     Generalized abdominal pain  Assessment & Plan  Secondary to abd cellulitis and/or recent post-op  Continue Tylenol; toradol switched to percocet due to CRISTHIAN  IV morphine for severe,breakthrough pain        VTE prophylaxis: Heparin

## 2020-12-20 NOTE — PROGRESS NOTES
"Surgery    Events  12/20: Less abdominal pain, less distention, less erythema    Vitals  /68   Pulse (!) 103   Temp 37 °C (98.6 °F) (Oral)   Resp 18   Ht 1.753 m (5' 9\")   Wt 93.1 kg (205 lb 4 oz)   SpO2 92%   BMI 30.31 kg/m²     Exam  Alert, conversant  Less distention, less erythema  Bandage and packing replaced at ostomy site.    Labs  WBC normal  Hgb stable around 11  Creatinine 0.97 -> 1.44 today    A/P)  12/29: readmitted with cellulitis and distention/poor PO intake, started on abx    Back down to full liquid diet  Continue ABx  FU Cx  Ambulate  Check CRP    Following along    Fred Sparks MD  [facility] (General and Vascular Surgery)  Cell: [phone] (text or call is fine, if you don't reach me please try my office)  Office: [phone]  __________________________________________________________________  Patient:Casey Arroyo   MRN:[id number]   CSN:[id number]    12/20/2020    1:54 PM    "

## 2020-12-20 NOTE — PROGRESS NOTES
Bedside report received. Assessment completed.  Pt is A&O x4. Pt on room air.   Medicating for pain PRN per MAR Pain 7/10  Denies nausea.   + numbness, + tingling bilateral hands and feet at baseling  Skin abdominal incision with gauze and tegaderm, redness outlined on abdomen. Abdomen red and tender, lumps hear incisional area.   LDA RFA and R wrist IV   Last BM 12/19/20 . +flatus,   +void.  REgular diet. Tolerates well.   Pt up standby  Pt complaining of gas and distention. Pt feels more comfortable when sitting in the chair.  Call light and belongings within reach. All needs met at this time. Fall Precautions and hourly rounding in place.

## 2020-12-20 NOTE — PROGRESS NOTES
AA&Ox4.    SpO2 92% on 2L O2 via NC. Denies SOB.    Reporting 5/10 pain. Medicated per MAR.    SKIN Gauze and Tegaderm over colostomy reversal site. Old drainage present.     Tolerating Full liquid diet. Denies N/V.    + void.    + BM / LBM 12/19/2020    Pt ambulates/up with stand by assist.    All needs met at this time. Call light within reach. Pt calls appropriately.

## 2020-12-21 LAB
AMORPH CRY #/AREA URNS HPF: PRESENT /HPF
ANION GAP SERPL CALC-SCNC: 5 MMOL/L (ref 7–16)
APPEARANCE UR: ABNORMAL
BACTERIA #/AREA URNS HPF: NEGATIVE /HPF
BASOPHILS # BLD AUTO: 0 % (ref 0–1.8)
BASOPHILS # BLD: 0 K/UL (ref 0–0.12)
BILIRUB UR QL STRIP.AUTO: NEGATIVE
BUN SERPL-MCNC: 23 MG/DL (ref 8–22)
CALCIUM SERPL-MCNC: 8.1 MG/DL (ref 8.5–10.5)
CHLORIDE SERPL-SCNC: 108 MMOL/L (ref 96–112)
CO2 SERPL-SCNC: 23 MMOL/L (ref 20–33)
COLOR UR: YELLOW
CREAT SERPL-MCNC: 1.66 MG/DL (ref 0.5–1.4)
CREAT UR-MCNC: 210.81 MG/DL
EOSINOPHIL # BLD AUTO: 0.27 K/UL (ref 0–0.51)
EOSINOPHIL NFR BLD: 4.9 % (ref 0–6.9)
EPI CELLS #/AREA URNS HPF: ABNORMAL /HPF
ERYTHROCYTE [DISTWIDTH] IN BLOOD BY AUTOMATED COUNT: 53.6 FL (ref 35.9–50)
GLUCOSE SERPL-MCNC: 109 MG/DL (ref 65–99)
GLUCOSE UR STRIP.AUTO-MCNC: NEGATIVE MG/DL
HCT VFR BLD AUTO: 31.1 % (ref 42–52)
HGB BLD-MCNC: 9.8 G/DL (ref 14–18)
HYALINE CASTS #/AREA URNS LPF: ABNORMAL /LPF
IMM GRANULOCYTES # BLD AUTO: 0.05 K/UL (ref 0–0.11)
IMM GRANULOCYTES NFR BLD AUTO: 0.9 % (ref 0–0.9)
KETONES UR STRIP.AUTO-MCNC: ABNORMAL MG/DL
LEUKOCYTE ESTERASE UR QL STRIP.AUTO: NEGATIVE
LYMPHOCYTES # BLD AUTO: 0.26 K/UL (ref 1–4.8)
LYMPHOCYTES NFR BLD: 4.8 % (ref 22–41)
MCH RBC QN AUTO: 34 PG (ref 27–33)
MCHC RBC AUTO-ENTMCNC: 31.5 G/DL (ref 33.7–35.3)
MCV RBC AUTO: 108 FL (ref 81.4–97.8)
MICRO URNS: ABNORMAL
MONOCYTES # BLD AUTO: 0.62 K/UL (ref 0–0.85)
MONOCYTES NFR BLD AUTO: 11.4 % (ref 0–13.4)
NEUTROPHILS # BLD AUTO: 4.26 K/UL (ref 1.82–7.42)
NEUTROPHILS NFR BLD: 78 % (ref 44–72)
NITRITE UR QL STRIP.AUTO: NEGATIVE
NRBC # BLD AUTO: 0 K/UL
NRBC BLD-RTO: 0 /100 WBC
PH UR STRIP.AUTO: 5 [PH] (ref 5–8)
PLATELET # BLD AUTO: 113 K/UL (ref 164–446)
PMV BLD AUTO: 10.5 FL (ref 9–12.9)
POTASSIUM SERPL-SCNC: 3.2 MMOL/L (ref 3.6–5.5)
POTASSIUM UR-SCNC: 40 MMOL/L
PROT UR QL STRIP: 100 MG/DL
RBC # BLD AUTO: 2.88 M/UL (ref 4.7–6.1)
RBC # URNS HPF: ABNORMAL /HPF
RBC UR QL AUTO: ABNORMAL
SODIUM SERPL-SCNC: 136 MMOL/L (ref 135–145)
SODIUM UR-SCNC: <20 MMOL/L
SP GR UR STRIP.AUTO: 1.03
UROBILINOGEN UR STRIP.AUTO-MCNC: 0.2 MG/DL
VANCOMYCIN TROUGH SERPL-MCNC: 6.5 UG/ML (ref 10–20)
WBC # BLD AUTO: 5.5 K/UL (ref 4.8–10.8)
WBC #/AREA URNS HPF: ABNORMAL /HPF

## 2020-12-21 PROCEDURE — 99232 SBSQ HOSP IP/OBS MODERATE 35: CPT | Performed by: STUDENT IN AN ORGANIZED HEALTH CARE EDUCATION/TRAINING PROGRAM

## 2020-12-21 PROCEDURE — A9270 NON-COVERED ITEM OR SERVICE: HCPCS | Performed by: STUDENT IN AN ORGANIZED HEALTH CARE EDUCATION/TRAINING PROGRAM

## 2020-12-21 PROCEDURE — 80048 BASIC METABOLIC PNL TOTAL CA: CPT

## 2020-12-21 PROCEDURE — A9270 NON-COVERED ITEM OR SERVICE: HCPCS | Performed by: INTERNAL MEDICINE

## 2020-12-21 PROCEDURE — 82570 ASSAY OF URINE CREATININE: CPT

## 2020-12-21 PROCEDURE — 700105 HCHG RX REV CODE 258: Performed by: STUDENT IN AN ORGANIZED HEALTH CARE EDUCATION/TRAINING PROGRAM

## 2020-12-21 PROCEDURE — 700102 HCHG RX REV CODE 250 W/ 637 OVERRIDE(OP): Performed by: STUDENT IN AN ORGANIZED HEALTH CARE EDUCATION/TRAINING PROGRAM

## 2020-12-21 PROCEDURE — 36415 COLL VENOUS BLD VENIPUNCTURE: CPT

## 2020-12-21 PROCEDURE — 700105 HCHG RX REV CODE 258: Performed by: INTERNAL MEDICINE

## 2020-12-21 PROCEDURE — 80202 ASSAY OF VANCOMYCIN: CPT

## 2020-12-21 PROCEDURE — 84300 ASSAY OF URINE SODIUM: CPT

## 2020-12-21 PROCEDURE — 770006 HCHG ROOM/CARE - MED/SURG/GYN SEMI*

## 2020-12-21 PROCEDURE — 700101 HCHG RX REV CODE 250: Performed by: SURGERY

## 2020-12-21 PROCEDURE — 85025 COMPLETE CBC W/AUTO DIFF WBC: CPT

## 2020-12-21 PROCEDURE — 700111 HCHG RX REV CODE 636 W/ 250 OVERRIDE (IP): Performed by: INTERNAL MEDICINE

## 2020-12-21 PROCEDURE — 700102 HCHG RX REV CODE 250 W/ 637 OVERRIDE(OP): Performed by: INTERNAL MEDICINE

## 2020-12-21 PROCEDURE — 81001 URINALYSIS AUTO W/SCOPE: CPT

## 2020-12-21 PROCEDURE — 84133 ASSAY OF URINE POTASSIUM: CPT

## 2020-12-21 RX ORDER — POLYETHYLENE GLYCOL 3350 17 G/17G
1 POWDER, FOR SOLUTION ORAL
Status: DISCONTINUED | OUTPATIENT
Start: 2020-12-21 | End: 2020-12-29 | Stop reason: HOSPADM

## 2020-12-21 RX ORDER — POTASSIUM CHLORIDE 20 MEQ/1
40 TABLET, EXTENDED RELEASE ORAL 3 TIMES DAILY
Status: COMPLETED | OUTPATIENT
Start: 2020-12-21 | End: 2020-12-21

## 2020-12-21 RX ADMIN — HEPARIN SODIUM 5000 UNITS: 5000 INJECTION, SOLUTION INTRAVENOUS; SUBCUTANEOUS at 08:25

## 2020-12-21 RX ADMIN — GABAPENTIN 200 MG: 100 CAPSULE ORAL at 12:30

## 2020-12-21 RX ADMIN — POLYETHYLENE GLYCOL 3350 1 PACKET: 17 POWDER, FOR SOLUTION ORAL at 21:00

## 2020-12-21 RX ADMIN — POTASSIUM CHLORIDE 40 MEQ: 1500 TABLET, EXTENDED RELEASE ORAL at 12:31

## 2020-12-21 RX ADMIN — GABAPENTIN 200 MG: 100 CAPSULE ORAL at 17:24

## 2020-12-21 RX ADMIN — PIPERACILLIN AND TAZOBACTAM 4.5 G: 4; .5 INJECTION, POWDER, LYOPHILIZED, FOR SOLUTION INTRAVENOUS; PARENTERAL at 00:30

## 2020-12-21 RX ADMIN — PIPERACILLIN AND TAZOBACTAM 4.5 G: 4; .5 INJECTION, POWDER, LYOPHILIZED, FOR SOLUTION INTRAVENOUS; PARENTERAL at 08:25

## 2020-12-21 RX ADMIN — OXYCODONE HYDROCHLORIDE AND ACETAMINOPHEN 1 TABLET: 5; 325 TABLET ORAL at 08:25

## 2020-12-21 RX ADMIN — GABAPENTIN 200 MG: 100 CAPSULE ORAL at 05:36

## 2020-12-21 RX ADMIN — PIPERACILLIN AND TAZOBACTAM 4.5 G: 4; .5 INJECTION, POWDER, LYOPHILIZED, FOR SOLUTION INTRAVENOUS; PARENTERAL at 17:25

## 2020-12-21 RX ADMIN — HEPARIN SODIUM 5000 UNITS: 5000 INJECTION, SOLUTION INTRAVENOUS; SUBCUTANEOUS at 17:24

## 2020-12-21 RX ADMIN — POTASSIUM CHLORIDE 40 MEQ: 1500 TABLET, EXTENDED RELEASE ORAL at 17:24

## 2020-12-21 RX ADMIN — SODIUM CHLORIDE: 9 INJECTION, SOLUTION INTRAVENOUS at 08:59

## 2020-12-21 RX ADMIN — POTASSIUM CHLORIDE 40 MEQ: 1500 TABLET, EXTENDED RELEASE ORAL at 08:25

## 2020-12-21 RX ADMIN — SODIUM CHLORIDE: 9 INJECTION, SOLUTION INTRAVENOUS at 21:08

## 2020-12-21 RX ADMIN — OXYCODONE HYDROCHLORIDE AND ACETAMINOPHEN 1 TABLET: 5; 325 TABLET ORAL at 14:53

## 2020-12-21 RX ADMIN — HEPARIN SODIUM 5000 UNITS: 5000 INJECTION, SOLUTION INTRAVENOUS; SUBCUTANEOUS at 00:29

## 2020-12-21 ASSESSMENT — ENCOUNTER SYMPTOMS
BLOOD IN STOOL: 0
NECK PAIN: 0
FLANK PAIN: 0
VOMITING: 0
WEAKNESS: 0
WHEEZING: 0
FEVER: 1
SHORTNESS OF BREATH: 0
CONSTIPATION: 1
NAUSEA: 1
DIZZINESS: 0
PALPITATIONS: 0
ABDOMINAL PAIN: 1
FOCAL WEAKNESS: 0
HEADACHES: 0
CHILLS: 1
BACK PAIN: 1
COUGH: 0

## 2020-12-21 ASSESSMENT — PAIN DESCRIPTION - PAIN TYPE
TYPE: ACUTE PAIN;SURGICAL PAIN
TYPE: ACUTE PAIN;SURGICAL PAIN
TYPE: ACUTE PAIN
TYPE: ACUTE PAIN
TYPE: ACUTE PAIN;SURGICAL PAIN

## 2020-12-21 NOTE — PROGRESS NOTES
Hospital Medicine Daily Progress Note    Date of Service  12/21/2020    Chief Complaint  70 y.o. male admitted 12/18/2020 with abdominal pain and fevers     Hospital Course  70 y.o. male who presented 12/18/2020 with history of hypertension and colorectal cancer status post hemicolectomy, postop day 3 (at that time) for ostomy reversal presented with abdominal distention and pain. CT revealed abdominal wall cellulitis, cecal constipation and presacral abscess. Surgery was consulted and recommended Zosyn.     12/19: Patient reported abdominal pain improved to 7/10 in AM Abdominal wall erythema decreased. He reported his last BM was in AM. K 3.2, replaced PO. Blood cx are NGTD. VSS on room air and afebrile.     12/20: Noted CRISTHIAN; vancomycin and Cozaar placed on hold. Plan was to just continue with Zosyn going forward. Abd seemed distended with wound drainage although abd wall erythema has subsided. Pt continued have frequent stools. Noted tachycardia and tachypnea in PM with some exp wheezes - CXR showed hypoinflation with likely atelectasis. Incentive spirometer use encouraged.     Interval Problem Update  Vitals reviewed; afebrile.  The rest of the vitals within normal parameters.    Pain rated around 3-5 in mid abdomen - no intervention needed in last 12 hours.    At bedside, no acute complain and expresses understanding current plan with Abx, surgery team follow up, IVF and increase oral intake.     Labs reviewed   WBC 7.3  Hb 10.3, Plt 98  K 3.4  Cr 0.97 > 1.44   Lactic acid 1      Consultants/Specialty  General Surgery     Code Status  Full Code    Disposition  Continue inpatient   Likely to home when medically clear    Discussed with patient, patient's nurse and with multidisciplinary team during rounds including , pharmacist and charge nurse.      I have performed the physical examination, and reviewed updated ROS and plan today 12/21/2020.  In review of yesterday's note, there are no new changes  except as documented above or bolded below.    Review of Systems  Review of Systems   Constitutional: Positive for chills, fever and malaise/fatigue.   Respiratory: Negative for cough, shortness of breath and wheezing.    Cardiovascular: Negative for chest pain and palpitations.   Gastrointestinal: Positive for abdominal pain, constipation and nausea. Negative for blood in stool, melena and vomiting.   Genitourinary: Positive for frequency. Negative for flank pain and hematuria.   Musculoskeletal: Positive for back pain. Negative for joint pain and neck pain.   Neurological: Negative for dizziness, focal weakness, weakness and headaches.        Physical Exam  Temp:  [36.7 °C (98 °F)-37.6 °C (99.6 °F)] 36.7 °C (98 °F)  Pulse:  [] 99  Resp:  [14-18] 18  BP: (107-128)/(59-68) 128/68  SpO2:  [92 %-96 %] 95 %    Physical Exam  Vitals signs and nursing note reviewed.   Constitutional:       General: He is not in acute distress.     Appearance: He is not toxic-appearing.   HENT:      Head: Normocephalic.      Mouth/Throat:      Mouth: Mucous membranes are moist.      Pharynx: Oropharynx is clear.   Eyes:      Pupils: Pupils are equal, round, and reactive to light.   Neck:      Musculoskeletal: Normal range of motion.   Cardiovascular:      Rate and Rhythm: Normal rate.      Heart sounds: Normal heart sounds.   Pulmonary:      Effort: Pulmonary effort is normal. No respiratory distress.      Breath sounds: Normal breath sounds. No wheezing or rales.   Abdominal:      General: Bowel sounds are normal. There is distension.      Palpations: Abdomen is rigid.      Tenderness: There is abdominal tenderness in the right upper quadrant and right lower quadrant. There is guarding. There is no rebound.      Hernia: A hernia is present. Hernia is present in the umbilical area.      Comments: Erythema to right side of abdomen improving. Incision covered with dressing which is c/d/i    Musculoskeletal:      Right lower leg: No  edema.      Left lower leg: No edema.   Skin:     General: Skin is warm and dry.   Neurological:      Mental Status: He is alert and oriented to person, place, and time. Mental status is at baseline.      Sensory: No sensory deficit.      Motor: No weakness.      Coordination: Coordination normal.   Psychiatric:         Thought Content: Thought content normal.         Judgment: Judgment normal.         Fluids    Intake/Output Summary (Last 24 hours) at 12/21/2020 1040  Last data filed at 12/21/2020 0400  Gross per 24 hour   Intake 2100 ml   Output --   Net 2100 ml       Laboratory  Recent Labs     12/19/20  0549 12/20/20  0113 12/21/20  0607   WBC 7.0 7.3 5.5   RBC 3.31* 3.16* 2.88*   HEMOGLOBIN 11.2* 10.9* 9.8*   HEMATOCRIT 34.9* 33.2* 31.1*   .4* 105.1* 108.0*   MCH 33.8* 34.5* 34.0*   MCHC 32.1* 32.8* 31.5*   RDW 52.5* 51.9* 53.6*   PLATELETCT 99* 98* 113*   MPV 9.7 9.6 10.5     Recent Labs     12/19/20  0549 12/20/20  0113 12/21/20  0607   SODIUM 134* 136 136   POTASSIUM 3.2* 3.4* 3.2*   CHLORIDE 105 104 108   CO2 22 22 23   GLUCOSE 106* 128* 109*   BUN 14 21 23*   CREATININE 0.97 1.44* 1.66*   CALCIUM 7.6* 8.2* 8.1*                   Imaging  DX-CHEST-PORTABLE (1 VIEW)   Final Result      Hypoinflation with bibasilar opacities, atelectasis and/or pneumonitis.      CT-ABDOMEN-PELVIS WITH   Final Result         1.  Presacral mildly enhancing fluid collection, appearance suggests abscess.   2.  Large quantity of stool in the cecum favors changes of constipation. Component of evolving [place]'s or ileus not excluded. Recommend radiographic follow-up to resolution   3.  Bladder wall thickening, consider changes of cystitis, correlate with urinalysis   4.  Bilateral fat-containing inguinal hernias   5.  Small layering left pleural effusion   6.  Fat-containing umbilical hernia   7.  Atherosclerosis and atherosclerotic coronary artery disease.           Assessment/Plan  * Abdominal wall cellulitis  Assessment &  Plan  General surgery consulted by ERP, recommended Zosyn  Borders of cellulitis outlined - appeared decreasing erythema  Follow CBC and cx     CRISTHIAN (acute kidney injury) (HCC)- (present on admission)  Assessment & Plan  ?in the setting of active infection vs medication induced  Hold vanco and losartan  Switched ketorolac to percocet prn for pain  C/w IVF NS  Will monitor    Sepsis (HCC)  Assessment & Plan  This is Sepsis Present on admission  SIRS criteria identified on admission include: Fever, with temperature greater than 101 deg F and Tachypnea, with respirations greater than 20 per minute  Source is abdomen   Sepsis protocol initiated  Fluid resuscitation ordered per protocol  IV antibiotics as appropriate for source of sepsis  While organ dysfunction may be noted elsewhere in this problem list or in the chart, degree of organ dysfunction does not meet CMS criteria for severe sepsis          Hypokalemia  Assessment & Plan  Mild   Repleted PO   Follow BMP     Macrocytic anemia  Assessment & Plan  May be related to recent surgery  Iron studies normal   H/H stable   Will check b12 and folate   Follow CBC     Hypertension  Assessment & Plan  With normal BP and CRISTHIAN, will hold losartan for now    CN (constipation)  Assessment & Plan  He does report multiple loose BM   Advance diet as tolerated     Generalized abdominal pain  Assessment & Plan  Secondary to abd cellulitis and/or recent post-op  Continue Tylenol; toradol switched to percocet due to CRISTHIAN  IV morphine for severe,breakthrough pain        VTE prophylaxis: Heparin

## 2020-12-21 NOTE — PROGRESS NOTES
AA&Ox4.     SpO2 96% on 2L O2 via NC. Denies SOB.     Reporting 3/10 pain. Medicated per MAR.     SKIN Gauze and Tegaderm over colostomy reversal site. Old drainage present. dressing changed multiple times. Draining serosanguinous output.       Tolerating Full liquid diet. Denies N/V.     + void.     + BM / LBM 12/21/2020     Pt ambulates/up with stand by assist.     All needs met at this time. Call light within reach. Pt calls appropriately.

## 2020-12-21 NOTE — PROGRESS NOTES
Writer received pt from Arlyn. Report given. Pt alert and orient in stable condition. No acute respiratory distress. Dressing to abdomen noted to be moderately saturated. Pt states MD came in and removed staples and state he wanted wound care to come treat.

## 2020-12-21 NOTE — PROGRESS NOTES
Bedside report received. Assessment completed.  Pt is A&O x4. Pt on room air.   Medicating for pain PRN per MAR Pain 5/10  Denies nausea.   + numbness, + tingling bilateral hands and feet at baseling  Skin abdominal incision with gauze and tegaderm with gauze pressure dressing on top with tape, redness outlined on abdomen. Abdomen red and tender, lumps near incisional area.           LDA RFA and R wrist IV   Last BM 12/20/20 . +flatus,   +void.  Full Liquid. Tolerates well.   Pt up standby  Pt wife at the bedside.  Call light and belongings within reach. All needs met at this time. Fall Precautions and hourly rounding in place.

## 2020-12-22 ENCOUNTER — APPOINTMENT (OUTPATIENT)
Dept: RADIOLOGY | Facility: MEDICAL CENTER | Age: 70
DRG: 862 | End: 2020-12-22
Attending: SURGERY
Payer: MEDICARE

## 2020-12-22 PROBLEM — D69.6 THROMBOCYTOPENIA (HCC): Status: ACTIVE | Noted: 2020-12-22

## 2020-12-22 PROBLEM — R18.8 PELVIC FLUID COLLECTION: Status: ACTIVE | Noted: 2020-12-22

## 2020-12-22 PROBLEM — Z98.890 HISTORY OF REVERSAL OF ILEOSTOMY: Status: ACTIVE | Noted: 2020-12-22

## 2020-12-22 LAB
ANION GAP SERPL CALC-SCNC: 7 MMOL/L (ref 7–16)
BASOPHILS # BLD AUTO: 0.2 % (ref 0–1.8)
BASOPHILS # BLD: 0.01 K/UL (ref 0–0.12)
BUN SERPL-MCNC: 18 MG/DL (ref 8–22)
CALCIUM SERPL-MCNC: 8.3 MG/DL (ref 8.5–10.5)
CHLORIDE SERPL-SCNC: 108 MMOL/L (ref 96–112)
CO2 SERPL-SCNC: 20 MMOL/L (ref 20–33)
CREAT SERPL-MCNC: 1.42 MG/DL (ref 0.5–1.4)
EOSINOPHIL # BLD AUTO: 0.33 K/UL (ref 0–0.51)
EOSINOPHIL NFR BLD: 7.2 % (ref 0–6.9)
ERYTHROCYTE [DISTWIDTH] IN BLOOD BY AUTOMATED COUNT: 52.1 FL (ref 35.9–50)
FOLATE SERPL-MCNC: 15.2 NG/ML
GLUCOSE SERPL-MCNC: 101 MG/DL (ref 65–99)
HCT VFR BLD AUTO: 29.2 % (ref 42–52)
HGB BLD-MCNC: 9.2 G/DL (ref 14–18)
IMM GRANULOCYTES # BLD AUTO: 0.03 K/UL (ref 0–0.11)
IMM GRANULOCYTES NFR BLD AUTO: 0.7 % (ref 0–0.9)
LYMPHOCYTES # BLD AUTO: 0.28 K/UL (ref 1–4.8)
LYMPHOCYTES NFR BLD: 6.1 % (ref 22–41)
MAGNESIUM SERPL-MCNC: 2 MG/DL (ref 1.5–2.5)
MCH RBC QN AUTO: 33.5 PG (ref 27–33)
MCHC RBC AUTO-ENTMCNC: 31.5 G/DL (ref 33.7–35.3)
MCV RBC AUTO: 106.2 FL (ref 81.4–97.8)
MONOCYTES # BLD AUTO: 0.65 K/UL (ref 0–0.85)
MONOCYTES NFR BLD AUTO: 14.1 % (ref 0–13.4)
NEUTROPHILS # BLD AUTO: 3.31 K/UL (ref 1.82–7.42)
NEUTROPHILS NFR BLD: 71.7 % (ref 44–72)
NRBC # BLD AUTO: 0 K/UL
NRBC BLD-RTO: 0 /100 WBC
PLATELET # BLD AUTO: 129 K/UL (ref 164–446)
PMV BLD AUTO: 10.6 FL (ref 9–12.9)
POTASSIUM SERPL-SCNC: 3.7 MMOL/L (ref 3.6–5.5)
RBC # BLD AUTO: 2.75 M/UL (ref 4.7–6.1)
SODIUM SERPL-SCNC: 135 MMOL/L (ref 135–145)
VIT B12 SERPL-MCNC: 440 PG/ML (ref 211–911)
WBC # BLD AUTO: 4.6 K/UL (ref 4.8–10.8)

## 2020-12-22 PROCEDURE — 700105 HCHG RX REV CODE 258: Performed by: STUDENT IN AN ORGANIZED HEALTH CARE EDUCATION/TRAINING PROGRAM

## 2020-12-22 PROCEDURE — 83735 ASSAY OF MAGNESIUM: CPT

## 2020-12-22 PROCEDURE — 700101 HCHG RX REV CODE 250: Performed by: SURGERY

## 2020-12-22 PROCEDURE — 82607 VITAMIN B-12: CPT

## 2020-12-22 PROCEDURE — 82746 ASSAY OF FOLIC ACID SERUM: CPT

## 2020-12-22 PROCEDURE — 700102 HCHG RX REV CODE 250 W/ 637 OVERRIDE(OP): Performed by: INTERNAL MEDICINE

## 2020-12-22 PROCEDURE — 700102 HCHG RX REV CODE 250 W/ 637 OVERRIDE(OP): Performed by: FAMILY MEDICINE

## 2020-12-22 PROCEDURE — 770006 HCHG ROOM/CARE - MED/SURG/GYN SEMI*

## 2020-12-22 PROCEDURE — 80048 BASIC METABOLIC PNL TOTAL CA: CPT

## 2020-12-22 PROCEDURE — 99233 SBSQ HOSP IP/OBS HIGH 50: CPT | Performed by: FAMILY MEDICINE

## 2020-12-22 PROCEDURE — 700111 HCHG RX REV CODE 636 W/ 250 OVERRIDE (IP): Performed by: INTERNAL MEDICINE

## 2020-12-22 PROCEDURE — 700105 HCHG RX REV CODE 258: Performed by: INTERNAL MEDICINE

## 2020-12-22 PROCEDURE — A9270 NON-COVERED ITEM OR SERVICE: HCPCS | Performed by: INTERNAL MEDICINE

## 2020-12-22 PROCEDURE — 36415 COLL VENOUS BLD VENIPUNCTURE: CPT

## 2020-12-22 PROCEDURE — 85025 COMPLETE CBC W/AUTO DIFF WBC: CPT

## 2020-12-22 PROCEDURE — A9270 NON-COVERED ITEM OR SERVICE: HCPCS | Performed by: FAMILY MEDICINE

## 2020-12-22 RX ORDER — GABAPENTIN 100 MG/1
100 CAPSULE ORAL 3 TIMES DAILY
Status: DISCONTINUED | OUTPATIENT
Start: 2020-12-22 | End: 2020-12-29 | Stop reason: HOSPADM

## 2020-12-22 RX ADMIN — HEPARIN SODIUM 5000 UNITS: 5000 INJECTION, SOLUTION INTRAVENOUS; SUBCUTANEOUS at 00:33

## 2020-12-22 RX ADMIN — PIPERACILLIN AND TAZOBACTAM 4.5 G: 4; .5 INJECTION, POWDER, LYOPHILIZED, FOR SOLUTION INTRAVENOUS; PARENTERAL at 09:26

## 2020-12-22 RX ADMIN — GABAPENTIN 100 MG: 100 CAPSULE ORAL at 12:22

## 2020-12-22 RX ADMIN — HEPARIN SODIUM 5000 UNITS: 5000 INJECTION, SOLUTION INTRAVENOUS; SUBCUTANEOUS at 08:04

## 2020-12-22 RX ADMIN — PIPERACILLIN AND TAZOBACTAM 4.5 G: 4; .5 INJECTION, POWDER, LYOPHILIZED, FOR SOLUTION INTRAVENOUS; PARENTERAL at 00:34

## 2020-12-22 RX ADMIN — GABAPENTIN 100 MG: 100 CAPSULE ORAL at 16:41

## 2020-12-22 RX ADMIN — GABAPENTIN 200 MG: 100 CAPSULE ORAL at 04:32

## 2020-12-22 RX ADMIN — SODIUM CHLORIDE: 9 INJECTION, SOLUTION INTRAVENOUS at 15:43

## 2020-12-22 RX ADMIN — POLYETHYLENE GLYCOL 3350 1 PACKET: 17 POWDER, FOR SOLUTION ORAL at 08:04

## 2020-12-22 RX ADMIN — PIPERACILLIN AND TAZOBACTAM 4.5 G: 4; .5 INJECTION, POWDER, LYOPHILIZED, FOR SOLUTION INTRAVENOUS; PARENTERAL at 16:40

## 2020-12-22 ASSESSMENT — PAIN DESCRIPTION - PAIN TYPE
TYPE: ACUTE PAIN
TYPE: ACUTE PAIN;SURGICAL PAIN
TYPE: ACUTE PAIN;SURGICAL PAIN

## 2020-12-22 ASSESSMENT — ENCOUNTER SYMPTOMS
SPEECH CHANGE: 0
FEVER: 0
CHILLS: 0
NERVOUS/ANXIOUS: 0
SENSORY CHANGE: 0
WEAKNESS: 1
MYALGIAS: 0
CONSTIPATION: 0
FLANK PAIN: 0
PALPITATIONS: 0
HEADACHES: 0
HEARTBURN: 0
DIARRHEA: 0
SORE THROAT: 0
COUGH: 0
SHORTNESS OF BREATH: 0
DIZZINESS: 0
NAUSEA: 0
ABDOMINAL PAIN: 1
WHEEZING: 0
NECK PAIN: 0
BACK PAIN: 0
VOMITING: 0
BLURRED VISION: 0
FOCAL WEAKNESS: 0

## 2020-12-22 NOTE — PROGRESS NOTES
Bedside report received. Assessment completed.  Pt is A&O x4. Pt on 1.5L via nasal canula.   Medicating for pain PRN per MAR Pain 2/10  Denies nausea.   + numbness, + tingling bilateral hands and feet at baseling  Skin abdominal incision  Is open after staple removal today. Wound is dressing with dry gauze roll packing, covered with 2 abd pads folded in half and tightly applied hypafix tape, abdominal pad over the top. Abdomen red and tender, redness is within the marker outline.           LDA pt removed IV while in the bathroom   Last BM 12/21/20 . +flatus,   +void.  Full Liquid. Tolerates well.   Pt up standby  Call light and belongings within reach. All needs met at this time. Fall Precautions and hourly rounding in place.

## 2020-12-22 NOTE — ASSESSMENT & PLAN NOTE
Flexible sigmoidoscopy - Wide open stapled anastomosis at 10cm. Boggy edematous mucosa and rectum probably related to XRT. Unprepped examine otherwise normal to midtransverse colon at 90cm. 12/24/2020  Bowel protocol

## 2020-12-22 NOTE — DISCHARGE PLANNING
Anticipated Discharge Disposition: TBD    Action:   · Completed chart review and discussed pt's POC in IDT rounds  · Pt on IV Zosyn  · IR consulting  · Pt has tunneling wound    Barriers to Discharge:   · Medical clearance  · PT/OT notes pending  · Discharge plan pending medical course     Plan: Continue to collaborate with the pt, pt's family, and health care team to provide social and discharge support as needed.

## 2020-12-22 NOTE — PROGRESS NOTES
AA&Ox4.     SpO2 97% on 1L O2 via NC. Denies SOB.     Reporting 3/10 pain. Medicated per MAR.     SKIN Gauze and Tegaderm over colostomy reversal site. Old drainage present. dressing changed multiple times. Draining serosanguinous output.       Tolerating NPO diet. Denies N/V.     + void.     + BM / LBM 12/22/2020     Pt ambulates/up with stand by assist.     All needs met at this time. Call light within reach. Pt calls appropriately.

## 2020-12-22 NOTE — PROGRESS NOTES
[facility] Medicine Daily Progress Note    Date of Service  12/22/2020    Chief Complaint  70 y.o. male admitted 12/18/2020 with abdominal wall cellulitis.    Hospital Course  Admitted with abdominal wall cellulitis and pelvic fluid collection which was noted on CT scan of abdomen and pelvis.  He had recent ileostomy reversal, he has history of robotic-assisted laparoscopic low anterior resection with creation of a diverting loop ileostomy.  He was placed on IV vancomycin and Zosyn. Surgery was consulted on the case.  He developed acute kidney injury, and IV vancomycin was stopped.    Interval Problem Update  Abdominal wall cellulitis - less erythema  CRISTHIAN - crea 1.4  HTN - sbp 110-130    Consultants/Specialty  Surgery     Code Status  Full Code    Disposition  TBD    Review of Systems  Review of Systems   Constitutional: Negative for chills, fever and malaise/fatigue.   HENT: Negative for congestion, hearing loss and sore throat.    Eyes: Negative for blurred vision.   Respiratory: Negative for cough, shortness of breath and wheezing.    Cardiovascular: Negative for chest pain and palpitations.   Gastrointestinal: Positive for abdominal pain. Negative for constipation, diarrhea, heartburn, nausea and vomiting.   Genitourinary: Negative for dysuria, flank pain and hematuria.   Musculoskeletal: Negative for back pain, joint pain, myalgias and neck pain.   Skin: Negative for rash.   Neurological: Positive for weakness. Negative for dizziness, sensory change, speech change, focal weakness and headaches.   Psychiatric/Behavioral: The patient is not nervous/anxious.         Physical Exam  Temp:  [36.7 °C (98.1 °F)-37.3 °C (99.2 °F)] 36.7 °C (98.1 °F)  Pulse:  [60-92] 80  Resp:  [18-20] 18  BP: (108-131)/(64-74) 131/67  SpO2:  [95 %-99 %] 97 %    Physical Exam  Vitals signs and nursing note reviewed.   Constitutional:       Appearance: He is obese.   HENT:      Head: Normocephalic and atraumatic.      Nose: No congestion.       Mouth/Throat:      Mouth: Mucous membranes are dry.   Eyes:      Extraocular Movements: Extraocular movements intact.      Conjunctiva/sclera: Conjunctivae normal.      Pupils: Pupils are equal, round, and reactive to light.   Neck:      Musculoskeletal: No muscular tenderness.   Cardiovascular:      Rate and Rhythm: Normal rate and regular rhythm.   Pulmonary:      Effort: Pulmonary effort is normal.      Breath sounds: Normal breath sounds.   Abdominal:      General: There is distension.      Tenderness: There is abdominal tenderness. There is no guarding or rebound.      Hernia: A hernia is present. Hernia is present in the umbilical area.   Musculoskeletal:      Right lower leg: No edema.      Left lower leg: No edema.   Skin:     Findings: Erythema (abdominal wall) present.   Neurological:      Mental Status: He is alert and oriented to person, place, and time. Mental status is at baseline.      Sensory: No sensory deficit.      Motor: No weakness.      Coordination: Coordination normal.   Psychiatric:         Thought Content: Thought content normal.         Judgment: Judgment normal.         Fluids    Intake/Output Summary (Last 24 hours) at 12/22/2020 1202  Last data filed at 12/22/2020 0000  Gross per 24 hour   Intake 1410 ml   Output --   Net 1410 ml       Laboratory  Recent Labs     12/20/20  0113 12/21/20  0607 12/22/20  0124   WBC 7.3 5.5 4.6*   RBC 3.16* 2.88* 2.75*   HEMOGLOBIN 10.9* 9.8* 9.2*   HEMATOCRIT 33.2* 31.1* 29.2*   .1* 108.0* 106.2*   MCH 34.5* 34.0* 33.5*   MCHC 32.8* 31.5* 31.5*   RDW 51.9* 53.6* 52.1*   PLATELETCT 98* 113* 129*   MPV 9.6 10.5 10.6     Recent Labs     12/20/20  0113 12/21/20  0607 12/22/20  0124   SODIUM 136 136 135   POTASSIUM 3.4* 3.2* 3.7   CHLORIDE 104 108 108   CO2 22 23 20   GLUCOSE 128* 109* 101*   BUN 21 23* 18   CREATININE 1.44* 1.66* 1.42*   CALCIUM 8.2* 8.1* 8.3*                   Imaging  DX-CHEST-PORTABLE (1 VIEW)   Final Result      Hypoinflation  with bibasilar opacities, atelectasis and/or pneumonitis.      CT-ABDOMEN-PELVIS WITH   Final Result         1.  Presacral mildly enhancing fluid collection, appearance suggests abscess.   2.  Large quantity of stool in the cecum favors changes of constipation. Component of evolving [place]'s or ileus not excluded. Recommend radiographic follow-up to resolution   3.  Bladder wall thickening, consider changes of cystitis, correlate with urinalysis   4.  Bilateral fat-containing inguinal hernias   5.  Small layering left pleural effusion   6.  Fat-containing umbilical hernia   7.  Atherosclerosis and atherosclerotic coronary artery disease.           Assessment/Plan  * Abdominal wall cellulitis- (present on admission)  Assessment & Plan  IV Zosyn  Wound care  Follow culture    History of reversal of ileostomy- (present on admission)  Assessment & Plan  Full liquids  Bowel protocol    CRISTHIAN (acute kidney injury) (HCC)- (present on admission)  Assessment & Plan  IVF NS, follow bmp  Check PTH     Sepsis (HCC)- (present on admission)  Assessment & Plan  This is Sepsis Present on admission  SIRS criteria identified on admission include: Fever, with temperature greater than 101 deg F and Tachypnea, with respirations greater than 20 per minute  Source is Cellulitis, Abscess  Sepsis protocol initiated  Fluid resuscitation ordered per protocol  IV antibiotics as appropriate for source of sepsis  While organ dysfunction may be noted elsewhere in this problem list or in the chart, degree of organ dysfunction does not meet CMS criteria for severe sepsis          Pelvic fluid collection- (present on admission)  Assessment & Plan  IV Zosyn  May need CT guided drain placement    Thrombocytopenia (HCC)- (present on admission)  Assessment & Plan  Follow cbc    Hypokalemia- (present on admission)  Assessment & Plan  Follow BMP, Mg, Ph    Macrocytic anemia- (present on admission)  Assessment & Plan  Follow cbc    Hypertension- (present on  admission)  Assessment & Plan  Hold Losartan        VTE prophylaxis: Heparin

## 2020-12-22 NOTE — WOUND TEAM
[facility]  Inpatient Services  Initial Wound and Skin Care Evaluation    Admission Date: 12/18/2020     Last order of IP CONSULT TO WOUND CARE was found on 12/21/2020 from Hospital Encounter on 12/18/2020       HPI, PMH, SH: Reviewed    Unit where seen by Wound Team: [id number]     WOUND CONSULT/FOLLOW UP RELATED TO:  Right LQ abdomen    Self Report / Pain Level:  0/10       OBJECTIVE:  Pt sitting in chair with intact dressing in place. Preventable measures in place, offloading ear foams to NC, patient is able to be up self.    WOUND TYPE, LOCATION, CHARACTERISTICS (Pressure Injuries: location, stage, POA or date identified)      Wound 12/15/20 Incision Abdomen Right 4x4 and tegaderm (Active)   Wound Image   12/22/20 1000   Site Assessment Red;Pink;White 12/22/20 1000   Periwound Assessment Clean;Dry;Intact;Edema 12/22/20 1000   Margins Defined edges 12/22/20 1000   Closure Secondary intention 12/22/20 1000   Drainage Amount Copious 12/22/20 1000   Drainage Description Serosanguineous 12/22/20 1000   Treatments Cleansed;Irrigation;Site care 12/22/20 1000   Wound Cleansing Normal Saline Irrigation 12/22/20 1000   Periwound Protectant Moisture Barrier 12/22/20 1000   Dressing Cleansing/Solutions Normal Saline 12/22/20 1000   Dressing Options Moist Roll Gauze;Absorbent Abdominal Pad;Hypafix Tape 12/22/20 1000   Dressing Changed Changed 12/22/20 1000   Dressing Status Clean;Dry;Intact 12/22/20 1000   Dressing Change/Treatment Frequency Daily, and As Needed 12/22/20 1000   NEXT Dressing Change/Treatment Date 12/23/20 12/22/20 1000   NEXT Weekly Photo (Inpatient Only) 12/29/20 12/22/20 1000   Wound Length (cm) 5.5 cm 12/22/20 1000   Wound Width (cm) 6.1 cm 12/22/20 1000   Wound Depth (cm) 2 cm 12/22/20 1000   Wound Surface Area (cm^2) 33.55 cm^2 12/22/20 1000   Wound Volume (cm^3) 67.1 cm^3 12/22/20 1000   Tunneling (cm) 13 cm 12/22/20 1000   Tunneling Clock Position of Wound 2 12/22/20 1000   Shape Circular  12/22/20 1000   Wound Odor None 12/22/20 1000   Exposed Structures Fascia;Adipose 12/22/20 1000   WOUND NURSE ONLY - Time Spent with Patient (mins) 45 12/22/20 1000               Vascular:    LEE ANN:   No results found.      Lab Values:    Lab Results   Component Value Date/Time    WBC 4.6 (L) 12/22/2020 01:24 AM    RBC 2.75 (L) 12/22/2020 01:24 AM    HEMOGLOBIN 9.2 (L) 12/22/2020 01:24 AM    HEMATOCRIT 29.2 (L) 12/22/2020 01:24 AM    CREACTPROT 15.80 (H) 12/20/2020 02:02 PM          Culture:     Culture Results show:  No results found for this or any previous visit (from the past 720 hour(s)).      INTERVENTIONS BY WOUND TEAM:  Chart reviewed.  Patient seen with bedside RNArlyn.  Patient's dressing removed, assessed drainage as it soaked through 2 ABD pads.  Yellow with small specks of blood, serosanguinous, no odor.  Wound bed assessed, moist red and with adipose apparent.  Patient's abdomen cleansed with normal saline irrigation and patted dry.  Patient charisse-wound skin protected with moisture barrier cream.  Wound depth is ~2.0cm, but there is a tract that extends straight down to 13.0cm at the 2 o'clock area.  Wet to dry normal saline moisten rolled gauze to the depth of the tract and to the wound bed.  Covered with 2 ABD pads and secured with hypafix tape and abdominal binder.     Interdisciplinary consultation: Patient, Bedside RN (Arlyn)    EVALUATION: Patient had a ileostomy reversal done by Dr. Aquino on 12/15, discharged next day.  Patietn developed increased abdominal distention and erythema to the abdomen.  Patient came in for evaluation, found to have a fluid collection, concern for abscess at the presacral area.  Wet to dry to help to wick drainage from abdominal site.  Clear serosanguinous drainage from old ostomy site.   Patient going to IR today for drainage of abscess.  If it does not clear the drainage at the ostomy site, wound team will place wound VAC to the ostomy site for drainage control and  help with closure if cleared by Dr. Aquino.      Goals: Steady decrease in wound area and depth weekly.    NURSING PLAN OF CARE ORDERS (X):    Dressing changes: See Dressing Care orders: x  Skin care: See Skin Care orders:   Rectal tube care: See Rectal Tube Care orders:   Other orders:    RSKIN:   CURRENTLY IN PLACE (X), APPLIED THIS VISIT (A), ORDERED (O):   Q shift Juan:  x  Q shift pressure point assessments:x   Pressure redistribution mattress            Low Airloss          Bariatric ALMA         Bariatric foam           Heel float boots     Heel Silicone dressing        Float Heels off Bed with Pillows               Barrier wipes         Barrier Cream         Barrier paste          Sacral silicone dressing         Silicone O2 tubing         Anchorfast         Cannula fixation Device (Tender )          Gray Foam Ear protectors     x      Trach with Optifoam split foam                 Waffle cushion        Waffle Overlay         Rectal tube or BMS    Purwick/Condom Cath          Antifungal tx      Interdry          Reposition q 2 hours        Up to chair        Ambulate      PT/OT        Dietician        Diabetes Education      PO     TF     TPN     NPO x   # days   Other        WOUND TEAM PLAN OF CARE   Dressing changes by wound team:          Follow up 1-2 times weekly:    x           Follow up 3 times weekly:                NPWT change 3 times weekly:     Follow up as needed:       Other (explain):     Anticipated discharge plans:  LTACH:        SNF/Rehab:                  Home Care:    x       Outpatient Wound Center:            Self Care:

## 2020-12-22 NOTE — PROGRESS NOTES
"Surgery    Events  12/20: Less abdominal pain, less distention, less erythema  12/21:  Report continued BM.  Denies nausea or vomiting.  Vitals  /66   Pulse 86   Temp 37.2 °C (98.9 °F) (Temporal)   Resp 20   Ht 1.753 m (5' 9\")   Wt 93.1 kg (205 lb 4 oz)   SpO2 96%   BMI 30.31 kg/m²     Exam  Alert, conversant  Less distention, less erythema  Bandage and packing replaced at ostomy site.    Labs  WBC normal  Hgb Down to 9.8  Creatinine 1.44 -> 1.66 today    A/P)  12/21: readmitted with cellulitis and distention/poor PO intake, started on abx  Back down to full liquid diet  Continue ABx  FU Cx  Ambulate  Consult wound care  Add miralx BID for constipation    Following along    Casey Aquino MD PhD  [facility]  Colon and Rectal Surgeon  [phone]  __________________________________________________________________  Patient:Casey Arroyo   MRN:[id number]   CSN:[id number]    12/20/2020    1:54 PM    "

## 2020-12-23 ENCOUNTER — APPOINTMENT (OUTPATIENT)
Dept: RADIOLOGY | Facility: MEDICAL CENTER | Age: 70
DRG: 862 | End: 2020-12-23
Attending: SURGERY
Payer: MEDICARE

## 2020-12-23 PROBLEM — E83.39 HYPOPHOSPHATEMIA: Status: ACTIVE | Noted: 2020-12-23

## 2020-12-23 LAB
ANION GAP SERPL CALC-SCNC: 6 MMOL/L (ref 7–16)
BACTERIA BLD CULT: NORMAL
BACTERIA BLD CULT: NORMAL
BASOPHILS # BLD AUTO: 0 % (ref 0–1.8)
BASOPHILS # BLD: 0 K/UL (ref 0–0.12)
BUN SERPL-MCNC: 13 MG/DL (ref 8–22)
CALCIUM SERPL-MCNC: 8.5 MG/DL (ref 8.5–10.5)
CHLORIDE SERPL-SCNC: 111 MMOL/L (ref 96–112)
CO2 SERPL-SCNC: 19 MMOL/L (ref 20–33)
CREAT SERPL-MCNC: 1.26 MG/DL (ref 0.5–1.4)
EOSINOPHIL # BLD AUTO: 0.26 K/UL (ref 0–0.51)
EOSINOPHIL NFR BLD: 5.5 % (ref 0–6.9)
ERYTHROCYTE [DISTWIDTH] IN BLOOD BY AUTOMATED COUNT: 51.6 FL (ref 35.9–50)
GLUCOSE SERPL-MCNC: 93 MG/DL (ref 65–99)
HCT VFR BLD AUTO: 30 % (ref 42–52)
HGB BLD-MCNC: 9.6 G/DL (ref 14–18)
IMM GRANULOCYTES # BLD AUTO: 0.03 K/UL (ref 0–0.11)
IMM GRANULOCYTES NFR BLD AUTO: 0.6 % (ref 0–0.9)
INR PPP: 1.07 (ref 0.87–1.13)
LYMPHOCYTES # BLD AUTO: 0.43 K/UL (ref 1–4.8)
LYMPHOCYTES NFR BLD: 9.1 % (ref 22–41)
MAGNESIUM SERPL-MCNC: 2 MG/DL (ref 1.5–2.5)
MCH RBC QN AUTO: 33.8 PG (ref 27–33)
MCHC RBC AUTO-ENTMCNC: 32 G/DL (ref 33.7–35.3)
MCV RBC AUTO: 105.6 FL (ref 81.4–97.8)
MONOCYTES # BLD AUTO: 0.66 K/UL (ref 0–0.85)
MONOCYTES NFR BLD AUTO: 14 % (ref 0–13.4)
NEUTROPHILS # BLD AUTO: 3.32 K/UL (ref 1.82–7.42)
NEUTROPHILS NFR BLD: 70.8 % (ref 44–72)
NRBC # BLD AUTO: 0 K/UL
NRBC BLD-RTO: 0 /100 WBC
PHOSPHATE SERPL-MCNC: 2.1 MG/DL (ref 2.5–4.5)
PLATELET # BLD AUTO: 174 K/UL (ref 164–446)
PMV BLD AUTO: 10 FL (ref 9–12.9)
POTASSIUM SERPL-SCNC: 3 MMOL/L (ref 3.6–5.5)
PROTHROMBIN TIME: 14.2 SEC (ref 12–14.6)
PTH-INTACT SERPL-MCNC: 52.7 PG/ML (ref 14–72)
RBC # BLD AUTO: 2.84 M/UL (ref 4.7–6.1)
SIGNIFICANT IND 70042: NORMAL
SIGNIFICANT IND 70042: NORMAL
SITE SITE: NORMAL
SITE SITE: NORMAL
SODIUM SERPL-SCNC: 136 MMOL/L (ref 135–145)
SOURCE SOURCE: NORMAL
SOURCE SOURCE: NORMAL
WBC # BLD AUTO: 4.7 K/UL (ref 4.8–10.8)

## 2020-12-23 PROCEDURE — 700111 HCHG RX REV CODE 636 W/ 250 OVERRIDE (IP): Performed by: RADIOLOGY

## 2020-12-23 PROCEDURE — 85610 PROTHROMBIN TIME: CPT

## 2020-12-23 PROCEDURE — 84100 ASSAY OF PHOSPHORUS: CPT

## 2020-12-23 PROCEDURE — 83970 ASSAY OF PARATHORMONE: CPT

## 2020-12-23 PROCEDURE — 700111 HCHG RX REV CODE 636 W/ 250 OVERRIDE (IP): Performed by: INTERNAL MEDICINE

## 2020-12-23 PROCEDURE — 700101 HCHG RX REV CODE 250: Performed by: SURGERY

## 2020-12-23 PROCEDURE — 700102 HCHG RX REV CODE 250 W/ 637 OVERRIDE(OP): Performed by: FAMILY MEDICINE

## 2020-12-23 PROCEDURE — 700105 HCHG RX REV CODE 258: Performed by: INTERNAL MEDICINE

## 2020-12-23 PROCEDURE — 700105 HCHG RX REV CODE 258: Performed by: STUDENT IN AN ORGANIZED HEALTH CARE EDUCATION/TRAINING PROGRAM

## 2020-12-23 PROCEDURE — 770006 HCHG ROOM/CARE - MED/SURG/GYN SEMI*

## 2020-12-23 PROCEDURE — 87205 SMEAR GRAM STAIN: CPT

## 2020-12-23 PROCEDURE — 80048 BASIC METABOLIC PNL TOTAL CA: CPT

## 2020-12-23 PROCEDURE — 700111 HCHG RX REV CODE 636 W/ 250 OVERRIDE (IP): Performed by: NURSE PRACTITIONER

## 2020-12-23 PROCEDURE — 99153 MOD SED SAME PHYS/QHP EA: CPT

## 2020-12-23 PROCEDURE — 85025 COMPLETE CBC W/AUTO DIFF WBC: CPT

## 2020-12-23 PROCEDURE — 83735 ASSAY OF MAGNESIUM: CPT

## 2020-12-23 PROCEDURE — 700111 HCHG RX REV CODE 636 W/ 250 OVERRIDE (IP)

## 2020-12-23 PROCEDURE — A9270 NON-COVERED ITEM OR SERVICE: HCPCS | Performed by: FAMILY MEDICINE

## 2020-12-23 PROCEDURE — 0W9J30Z DRAINAGE OF PELVIC CAVITY WITH DRAINAGE DEVICE, PERCUTANEOUS APPROACH: ICD-10-PCS | Performed by: RADIOLOGY

## 2020-12-23 PROCEDURE — 36415 COLL VENOUS BLD VENIPUNCTURE: CPT

## 2020-12-23 PROCEDURE — 99233 SBSQ HOSP IP/OBS HIGH 50: CPT | Performed by: FAMILY MEDICINE

## 2020-12-23 PROCEDURE — 87070 CULTURE OTHR SPECIMN AEROBIC: CPT

## 2020-12-23 RX ORDER — SODIUM CHLORIDE 9 MG/ML
500 INJECTION, SOLUTION INTRAVENOUS
Status: ACTIVE | OUTPATIENT
Start: 2020-12-23 | End: 2020-12-23

## 2020-12-23 RX ORDER — MIDAZOLAM HYDROCHLORIDE 1 MG/ML
INJECTION INTRAMUSCULAR; INTRAVENOUS
Status: COMPLETED
Start: 2020-12-23 | End: 2020-12-23

## 2020-12-23 RX ORDER — MIDAZOLAM HYDROCHLORIDE 1 MG/ML
.5-2 INJECTION INTRAMUSCULAR; INTRAVENOUS PRN
Status: ACTIVE | OUTPATIENT
Start: 2020-12-23 | End: 2020-12-23

## 2020-12-23 RX ORDER — NALOXONE HYDROCHLORIDE 0.4 MG/ML
INJECTION, SOLUTION INTRAMUSCULAR; INTRAVENOUS; SUBCUTANEOUS
Status: COMPLETED
Start: 2020-12-23 | End: 2020-12-23

## 2020-12-23 RX ORDER — POTASSIUM CHLORIDE 20 MEQ/1
20 TABLET, EXTENDED RELEASE ORAL ONCE
Status: COMPLETED | OUTPATIENT
Start: 2020-12-23 | End: 2020-12-23

## 2020-12-23 RX ORDER — ONDANSETRON 2 MG/ML
4 INJECTION INTRAMUSCULAR; INTRAVENOUS PRN
Status: ACTIVE | OUTPATIENT
Start: 2020-12-23 | End: 2020-12-23

## 2020-12-23 RX ADMIN — GABAPENTIN 100 MG: 100 CAPSULE ORAL at 11:23

## 2020-12-23 RX ADMIN — SODIUM CHLORIDE: 9 INJECTION, SOLUTION INTRAVENOUS at 01:12

## 2020-12-23 RX ADMIN — MIDAZOLAM HYDROCHLORIDE 2 MG: 1 INJECTION INTRAMUSCULAR; INTRAVENOUS at 15:47

## 2020-12-23 RX ADMIN — MORPHINE SULFATE 1 MG: 4 INJECTION INTRAVENOUS at 14:18

## 2020-12-23 RX ADMIN — HEPARIN SODIUM 5000 UNITS: 5000 INJECTION, SOLUTION INTRAVENOUS; SUBCUTANEOUS at 23:14

## 2020-12-23 RX ADMIN — HEPARIN SODIUM 5000 UNITS: 5000 INJECTION, SOLUTION INTRAVENOUS; SUBCUTANEOUS at 16:56

## 2020-12-23 RX ADMIN — GABAPENTIN 100 MG: 100 CAPSULE ORAL at 06:03

## 2020-12-23 RX ADMIN — PIPERACILLIN AND TAZOBACTAM 4.5 G: 4; .5 INJECTION, POWDER, LYOPHILIZED, FOR SOLUTION INTRAVENOUS; PARENTERAL at 01:11

## 2020-12-23 RX ADMIN — FENTANYL CITRATE 50 MCG: 50 INJECTION, SOLUTION INTRAMUSCULAR; INTRAVENOUS at 15:53

## 2020-12-23 RX ADMIN — PIPERACILLIN AND TAZOBACTAM 4.5 G: 4; .5 INJECTION, POWDER, LYOPHILIZED, FOR SOLUTION INTRAVENOUS; PARENTERAL at 08:22

## 2020-12-23 RX ADMIN — FENTANYL CITRATE 50 MCG: 50 INJECTION, SOLUTION INTRAMUSCULAR; INTRAVENOUS at 15:56

## 2020-12-23 RX ADMIN — DIBASIC SODIUM PHOSPHATE, MONOBASIC POTASSIUM PHOSPHATE AND MONOBASIC SODIUM PHOSPHATE 250 MG: 852; 155; 130 TABLET ORAL at 16:57

## 2020-12-23 RX ADMIN — FENTANYL CITRATE 50 MCG: 50 INJECTION, SOLUTION INTRAMUSCULAR; INTRAVENOUS at 15:47

## 2020-12-23 RX ADMIN — SODIUM CHLORIDE: 9 INJECTION, SOLUTION INTRAVENOUS at 11:20

## 2020-12-23 RX ADMIN — MIDAZOLAM HYDROCHLORIDE 1 MG: 1 INJECTION INTRAMUSCULAR; INTRAVENOUS at 15:53

## 2020-12-23 RX ADMIN — POTASSIUM CHLORIDE 20 MEQ: 1500 TABLET, EXTENDED RELEASE ORAL at 08:22

## 2020-12-23 RX ADMIN — MIDAZOLAM HYDROCHLORIDE 2 MG: 1 INJECTION, SOLUTION INTRAMUSCULAR; INTRAVENOUS at 15:47

## 2020-12-23 RX ADMIN — POLYETHYLENE GLYCOL 3350 1 PACKET: 17 POWDER, FOR SOLUTION ORAL at 21:00

## 2020-12-23 RX ADMIN — GABAPENTIN 100 MG: 100 CAPSULE ORAL at 16:56

## 2020-12-23 RX ADMIN — PIPERACILLIN AND TAZOBACTAM 4.5 G: 4; .5 INJECTION, POWDER, LYOPHILIZED, FOR SOLUTION INTRAVENOUS; PARENTERAL at 16:56

## 2020-12-23 RX ADMIN — DIBASIC SODIUM PHOSPHATE, MONOBASIC POTASSIUM PHOSPHATE AND MONOBASIC SODIUM PHOSPHATE 250 MG: 852; 155; 130 TABLET ORAL at 08:22

## 2020-12-23 RX ADMIN — MIDAZOLAM HYDROCHLORIDE 1 MG: 1 INJECTION, SOLUTION INTRAMUSCULAR; INTRAVENOUS at 15:53

## 2020-12-23 ASSESSMENT — ENCOUNTER SYMPTOMS
HEARTBURN: 0
BLURRED VISION: 0
FLANK PAIN: 0
NECK PAIN: 0
SPEECH CHANGE: 0
HEADACHES: 0
SHORTNESS OF BREATH: 0
SORE THROAT: 0
BACK PAIN: 0
FOCAL WEAKNESS: 0
COUGH: 0
CHILLS: 0
NERVOUS/ANXIOUS: 0
ABDOMINAL PAIN: 1
SENSORY CHANGE: 0
WHEEZING: 0
FEVER: 0
WEAKNESS: 1
DIARRHEA: 0
MYALGIAS: 0
PALPITATIONS: 0
DIZZINESS: 0
CONSTIPATION: 0
NAUSEA: 0
VOMITING: 0

## 2020-12-23 ASSESSMENT — PAIN DESCRIPTION - PAIN TYPE
TYPE: ACUTE PAIN
TYPE: ACUTE PAIN

## 2020-12-23 NOTE — PROGRESS NOTES
"Surgery    Events  12/20: Less abdominal pain, less distention, less erythema  12/21:  Report continued BM.  Denies nausea or vomiting.  12/22:  UNDERWENT DRAINAGE OF ABSCESS CAVITY  Vitals  /65   Pulse 78   Temp 36.8 °C (98.3 °F) (Temporal)   Resp 18   Ht 1.753 m (5' 9\")   Wt 93.1 kg (205 lb 4 oz)   SpO2 95%   BMI 30.31 kg/m²     Exam  Alert, conversant  Less distention, less erythema  Bandage and packing replaced at ostomy site.    Labs  WBC normal  Hgb stable  Creatinine Stable    A/P)  full liquid diet  Continue ABx with drain cultures pending  FU Cx  Ambulate  Appreciate wound care reccs  Add miralx BID for constipation  Casey Aquino MD PhD  [facility]  Colon and Rectal Surgeon  [phone]      Following along    Casey Aquino MD PhD  [facility]  Colon and Rectal Surgeon  [phone]  __________________________________________________________________  Patient:Casey Arroyo   MRN:[id number]   CSN:[id number]    12/20/2020    1:54 PM    "

## 2020-12-23 NOTE — PROGRESS NOTES
Received report from day shift RN.   Assessment complete. VSS  A+Ox4, patient calls appropriately.   Patient declines pain  Patient ambulates with SBA  Denies N/V, tolerating a full liquid diet, NPO at midnight.   Dressing changed on abdomen  + flatus  + BM    + void  Patient on RA, denies SOB  Denies numbness and tingling.   Patient is resting comfortably in bed. Reviewed plan of care. Call light and personal belongings in reach. Bed locked and in lowest position. Hourly rounding in place. All needs met at this time.

## 2020-12-23 NOTE — DISCHARGE PLANNING
Wound care DEYSI Locke notified this CCA that the pt. Has a wound vac now and that pt. Will need outpt. Wound care until  is set up.  LIDIA Viera notified

## 2020-12-23 NOTE — DISCHARGE PLANNING
Anticipated Discharge Disposition:   Home with OP Wound Clinic Appointment vs Home Health    Action:   This RN CM spoke with Karyn of [facility]. Per Karyn they might not have an open appointment for Pt until 2 weeks from now.   Requested Dr Keenan to put in an order/referral for Home Health. Pt has a regular wound vac.     Barriers to Discharge:   Acceptance to    Medical Clearance    Plan:     This RN CM will continue to assist Pt with discharge as needed.     Addendum: Per Yesenia of [facility] , Pt has an appointment on Dec 30 at 15;00 . Will inform Pt.

## 2020-12-23 NOTE — PROGRESS NOTES
Assessment complete.  AA&Ox4.   SpO2 >90% on RA. Denies SOB.  Denies any pain.  Dressing to colostomy reversal site dry and intact. Old drainage noted. Abdominal binder worn.  NPO for IR procedure today. Denies N/V.  + void. + BM.   Pt  Up with SBA.   All needs met at this time. Call light within reach. Pt calls appropriately.

## 2020-12-23 NOTE — PROGRESS NOTES
[facility] Medicine Daily Progress Note    Date of Service  12/23/2020    Chief Complaint  70 y.o. male admitted 12/18/2020 with abdominal wall cellulitis.    Hospital Course  Admitted with abdominal wall cellulitis and pelvic fluid collection which was noted on CT scan of abdomen and pelvis.  He had recent ileostomy reversal, he has history of robotic-assisted laparoscopic low anterior resection with creation of a diverting loop ileostomy.  He was placed on IV vancomycin and Zosyn. Surgery was consulted on the case.  He developed acute kidney injury, and IV vancomycin was stopped.    Interval Problem Update  Abdominal wall cellulitis - less erythema  Pelvic fluid collection - for CT-guided drain placement  CRISTHIAN - crea 1.2  HTN - sbp 110-130  Low potassium and phosphorus    Lengthy discussion with patient and spouse, updates given, plan of care discussed.    Consultants/Specialty  Surgery     Code Status  Full Code    Disposition  TBD    Review of Systems  Review of Systems   Constitutional: Negative for chills, fever and malaise/fatigue.   HENT: Negative for congestion, hearing loss and sore throat.    Eyes: Negative for blurred vision.   Respiratory: Negative for cough, shortness of breath and wheezing.    Cardiovascular: Negative for chest pain and palpitations.   Gastrointestinal: Positive for abdominal pain. Negative for constipation, diarrhea, heartburn, nausea and vomiting.   Genitourinary: Negative for dysuria, flank pain and hematuria.   Musculoskeletal: Negative for back pain, joint pain, myalgias and neck pain.   Skin: Negative for rash.   Neurological: Positive for weakness. Negative for dizziness, sensory change, speech change, focal weakness and headaches.   Psychiatric/Behavioral: The patient is not nervous/anxious.         Physical Exam  Temp:  [36.6 °C (97.8 °F)-37.1 °C (98.7 °F)] 36.8 °C (98.2 °F)  Pulse:  [76-80] 76  Resp:  [16-18] 16  BP: (119-132)/(65-73) 132/73  SpO2:  [95 %-97 %] 97 %    Physical  Exam  Vitals signs and nursing note reviewed.   Constitutional:       Appearance: He is obese.   HENT:      Head: Normocephalic and atraumatic.      Nose: No congestion.      Mouth/Throat:      Mouth: Mucous membranes are dry.   Eyes:      Extraocular Movements: Extraocular movements intact.      Conjunctiva/sclera: Conjunctivae normal.      Pupils: Pupils are equal, round, and reactive to light.   Neck:      Musculoskeletal: No muscular tenderness.   Cardiovascular:      Rate and Rhythm: Normal rate and regular rhythm.   Pulmonary:      Effort: Pulmonary effort is normal.      Breath sounds: Normal breath sounds.   Abdominal:      General: There is distension.      Tenderness: There is abdominal tenderness. There is no guarding or rebound.   Musculoskeletal:      Right lower leg: No edema.      Left lower leg: No edema.   Skin:     Findings: Erythema (abdominal wall) and wound (RLQ abdomen) present.   Neurological:      General: No focal deficit present.      Mental Status: He is alert and oriented to person, place, and time. Mental status is at baseline.         Fluids    Intake/Output Summary (Last 24 hours) at 12/23/2020 1005  Last data filed at 12/22/2020 2000  Gross per 24 hour   Intake 120 ml   Output --   Net 120 ml       Laboratory  Recent Labs     12/21/20  0607 12/22/20  0124 12/23/20  0509   WBC 5.5 4.6* 4.7*   RBC 2.88* 2.75* 2.84*   HEMOGLOBIN 9.8* 9.2* 9.6*   HEMATOCRIT 31.1* 29.2* 30.0*   .0* 106.2* 105.6*   MCH 34.0* 33.5* 33.8*   MCHC 31.5* 31.5* 32.0*   RDW 53.6* 52.1* 51.6*   PLATELETCT 113* 129* 174   MPV 10.5 10.6 10.0     Recent Labs     12/21/20  0607 12/22/20  0124 12/23/20  0509   SODIUM 136 135 136   POTASSIUM 3.2* 3.7 3.0*   CHLORIDE 108 108 111   CO2 23 20 19*   GLUCOSE 109* 101* 93   BUN 23* 18 13   CREATININE 1.66* 1.42* 1.26   CALCIUM 8.1* 8.3* 8.5     Recent Labs     12/23/20  0800   INR 1.07               Imaging  DX-CHEST-PORTABLE (1 VIEW)   Final Result      Hypoinflation  with bibasilar opacities, atelectasis and/or pneumonitis.      CT-ABDOMEN-PELVIS WITH   Final Result         1.  Presacral mildly enhancing fluid collection, appearance suggests abscess.   2.  Large quantity of stool in the cecum favors changes of constipation. Component of evolving Rathdrum's or ileus not excluded. Recommend radiographic follow-up to resolution   3.  Bladder wall thickening, consider changes of cystitis, correlate with urinalysis   4.  Bilateral fat-containing inguinal hernias   5.  Small layering left pleural effusion   6.  Fat-containing umbilical hernia   7.  Atherosclerosis and atherosclerotic coronary artery disease.      CT-DRAIN-RETROPERITONEAL    (Results Pending)        Assessment/Plan  * Abdominal wall cellulitis- (present on admission)  Assessment & Plan  IV Zosyn  Wound care  Follow culture    Pelvic fluid collection- (present on admission)  Assessment & Plan  IV Zosyn  for CT guided drain placement    History of reversal of ileostomy- (present on admission)  Assessment & Plan  Full liquids  Bowel protocol    CRISTHIAN (acute kidney injury) (HCC)- (present on admission)  Assessment & Plan  IVF NS, follow bmp    Sepsis (HCC)- (present on admission)  Assessment & Plan  This is Sepsis Present on admission  SIRS criteria identified on admission include: Fever, with temperature greater than 101 deg F and Tachypnea, with respirations greater than 20 per minute  Source is Cellulitis, Abscess  Sepsis protocol initiated  Fluid resuscitation ordered per protocol  IV antibiotics as appropriate for source of sepsis  While organ dysfunction may be noted elsewhere in this problem list or in the chart, degree of organ dysfunction does not meet CMS criteria for severe sepsis          Hypophosphatemia- (present on admission)  Assessment & Plan  Start Kphos, follow level    Thrombocytopenia (HCC)- (present on admission)  Assessment & Plan  Follow cbc    Hypokalemia- (present on admission)  Assessment &  Plan  Kdur, follow BMP    Macrocytic anemia- (present on admission)  Assessment & Plan  Follow cbc    Hypertension- (present on admission)  Assessment & Plan  Hold Losartan        VTE prophylaxis: Heparin

## 2020-12-24 ENCOUNTER — ANESTHESIA EVENT (OUTPATIENT)
Dept: SURGERY | Facility: MEDICAL CENTER | Age: 70
DRG: 862 | End: 2020-12-24
Payer: MEDICARE

## 2020-12-24 ENCOUNTER — ANESTHESIA (OUTPATIENT)
Dept: SURGERY | Facility: MEDICAL CENTER | Age: 70
DRG: 862 | End: 2020-12-24
Payer: MEDICARE

## 2020-12-24 LAB
ANION GAP SERPL CALC-SCNC: 7 MMOL/L (ref 7–16)
BASOPHILS # BLD AUTO: 0.2 % (ref 0–1.8)
BASOPHILS # BLD: 0.01 K/UL (ref 0–0.12)
BUN SERPL-MCNC: 11 MG/DL (ref 8–22)
CALCIUM SERPL-MCNC: 8.7 MG/DL (ref 8.5–10.5)
CHLORIDE SERPL-SCNC: 110 MMOL/L (ref 96–112)
CO2 SERPL-SCNC: 22 MMOL/L (ref 20–33)
CREAT SERPL-MCNC: 1.1 MG/DL (ref 0.5–1.4)
EOSINOPHIL # BLD AUTO: 0.22 K/UL (ref 0–0.51)
EOSINOPHIL NFR BLD: 5 % (ref 0–6.9)
ERYTHROCYTE [DISTWIDTH] IN BLOOD BY AUTOMATED COUNT: 50.8 FL (ref 35.9–50)
GLUCOSE SERPL-MCNC: 98 MG/DL (ref 65–99)
GRAM STN SPEC: NORMAL
HCT VFR BLD AUTO: 28.6 % (ref 42–52)
HGB BLD-MCNC: 9.3 G/DL (ref 14–18)
IMM GRANULOCYTES # BLD AUTO: 0.05 K/UL (ref 0–0.11)
IMM GRANULOCYTES NFR BLD AUTO: 1.1 % (ref 0–0.9)
LYMPHOCYTES # BLD AUTO: 0.52 K/UL (ref 1–4.8)
LYMPHOCYTES NFR BLD: 11.8 % (ref 22–41)
MAGNESIUM SERPL-MCNC: 2 MG/DL (ref 1.5–2.5)
MCH RBC QN AUTO: 33.8 PG (ref 27–33)
MCHC RBC AUTO-ENTMCNC: 32.5 G/DL (ref 33.7–35.3)
MCV RBC AUTO: 104 FL (ref 81.4–97.8)
MONOCYTES # BLD AUTO: 0.59 K/UL (ref 0–0.85)
MONOCYTES NFR BLD AUTO: 13.4 % (ref 0–13.4)
NEUTROPHILS # BLD AUTO: 3.02 K/UL (ref 1.82–7.42)
NEUTROPHILS NFR BLD: 68.5 % (ref 44–72)
NRBC # BLD AUTO: 0 K/UL
NRBC BLD-RTO: 0 /100 WBC
PHOSPHATE SERPL-MCNC: 2.2 MG/DL (ref 2.5–4.5)
PLATELET # BLD AUTO: 171 K/UL (ref 164–446)
PMV BLD AUTO: 9.4 FL (ref 9–12.9)
POTASSIUM SERPL-SCNC: 2.7 MMOL/L (ref 3.6–5.5)
POTASSIUM SERPL-SCNC: 2.8 MMOL/L (ref 3.6–5.5)
RBC # BLD AUTO: 2.75 M/UL (ref 4.7–6.1)
SIGNIFICANT IND 70042: NORMAL
SITE SITE: NORMAL
SODIUM SERPL-SCNC: 139 MMOL/L (ref 135–145)
SOURCE SOURCE: NORMAL
WBC # BLD AUTO: 4.4 K/UL (ref 4.8–10.8)

## 2020-12-24 PROCEDURE — 36415 COLL VENOUS BLD VENIPUNCTURE: CPT

## 2020-12-24 PROCEDURE — 0DJD8ZZ INSPECTION OF LOWER INTESTINAL TRACT, VIA NATURAL OR ARTIFICIAL OPENING ENDOSCOPIC: ICD-10-PCS | Performed by: INTERNAL MEDICINE

## 2020-12-24 PROCEDURE — 700105 HCHG RX REV CODE 258: Performed by: FAMILY MEDICINE

## 2020-12-24 PROCEDURE — A9270 NON-COVERED ITEM OR SERVICE: HCPCS | Performed by: FAMILY MEDICINE

## 2020-12-24 PROCEDURE — 80048 BASIC METABOLIC PNL TOTAL CA: CPT

## 2020-12-24 PROCEDURE — 160002 HCHG RECOVERY MINUTES (STAT): Performed by: INTERNAL MEDICINE

## 2020-12-24 PROCEDURE — 160048 HCHG OR STATISTICAL LEVEL 1-5: Performed by: INTERNAL MEDICINE

## 2020-12-24 PROCEDURE — 700111 HCHG RX REV CODE 636 W/ 250 OVERRIDE (IP): Performed by: INTERNAL MEDICINE

## 2020-12-24 PROCEDURE — 99233 SBSQ HOSP IP/OBS HIGH 50: CPT | Performed by: FAMILY MEDICINE

## 2020-12-24 PROCEDURE — 84100 ASSAY OF PHOSPHORUS: CPT

## 2020-12-24 PROCEDURE — 160035 HCHG PACU - 1ST 60 MINS PHASE I: Performed by: INTERNAL MEDICINE

## 2020-12-24 PROCEDURE — 160203 HCHG ENDO MINUTES - 1ST 30 MINS LEVEL 4: Performed by: INTERNAL MEDICINE

## 2020-12-24 PROCEDURE — 84132 ASSAY OF SERUM POTASSIUM: CPT

## 2020-12-24 PROCEDURE — 83735 ASSAY OF MAGNESIUM: CPT

## 2020-12-24 PROCEDURE — 700111 HCHG RX REV CODE 636 W/ 250 OVERRIDE (IP): Performed by: NURSE PRACTITIONER

## 2020-12-24 PROCEDURE — 85025 COMPLETE CBC W/AUTO DIFF WBC: CPT

## 2020-12-24 PROCEDURE — 700102 HCHG RX REV CODE 250 W/ 637 OVERRIDE(OP): Performed by: FAMILY MEDICINE

## 2020-12-24 PROCEDURE — 770006 HCHG ROOM/CARE - MED/SURG/GYN SEMI*

## 2020-12-24 PROCEDURE — 700105 HCHG RX REV CODE 258: Performed by: ANESTHESIOLOGY

## 2020-12-24 PROCEDURE — 700111 HCHG RX REV CODE 636 W/ 250 OVERRIDE (IP): Performed by: ANESTHESIOLOGY

## 2020-12-24 PROCEDURE — 160009 HCHG ANES TIME/MIN: Performed by: INTERNAL MEDICINE

## 2020-12-24 PROCEDURE — 700111 HCHG RX REV CODE 636 W/ 250 OVERRIDE (IP): Performed by: FAMILY MEDICINE

## 2020-12-24 RX ORDER — ONDANSETRON 2 MG/ML
4 INJECTION INTRAMUSCULAR; INTRAVENOUS
Status: DISCONTINUED | OUTPATIENT
Start: 2020-12-24 | End: 2020-12-24 | Stop reason: HOSPADM

## 2020-12-24 RX ORDER — MIDAZOLAM HYDROCHLORIDE 1 MG/ML
1 INJECTION INTRAMUSCULAR; INTRAVENOUS
Status: DISCONTINUED | OUTPATIENT
Start: 2020-12-24 | End: 2020-12-24 | Stop reason: HOSPADM

## 2020-12-24 RX ORDER — DIPHENHYDRAMINE HYDROCHLORIDE 50 MG/ML
12.5 INJECTION INTRAMUSCULAR; INTRAVENOUS
Status: DISCONTINUED | OUTPATIENT
Start: 2020-12-24 | End: 2020-12-24 | Stop reason: HOSPADM

## 2020-12-24 RX ORDER — POTASSIUM CHLORIDE 7.45 MG/ML
10 INJECTION INTRAVENOUS
Status: DISPENSED | OUTPATIENT
Start: 2020-12-24 | End: 2020-12-24

## 2020-12-24 RX ORDER — SODIUM CHLORIDE, SODIUM LACTATE, POTASSIUM CHLORIDE, CALCIUM CHLORIDE 600; 310; 30; 20 MG/100ML; MG/100ML; MG/100ML; MG/100ML
INJECTION, SOLUTION INTRAVENOUS
Status: DISCONTINUED | OUTPATIENT
Start: 2020-12-24 | End: 2020-12-24 | Stop reason: SURG

## 2020-12-24 RX ORDER — SODIUM CHLORIDE, SODIUM LACTATE, POTASSIUM CHLORIDE, CALCIUM CHLORIDE 600; 310; 30; 20 MG/100ML; MG/100ML; MG/100ML; MG/100ML
INJECTION, SOLUTION INTRAVENOUS CONTINUOUS
Status: DISCONTINUED | OUTPATIENT
Start: 2020-12-24 | End: 2020-12-24 | Stop reason: HOSPADM

## 2020-12-24 RX ORDER — HALOPERIDOL 5 MG/ML
1 INJECTION INTRAMUSCULAR
Status: DISCONTINUED | OUTPATIENT
Start: 2020-12-24 | End: 2020-12-24 | Stop reason: HOSPADM

## 2020-12-24 RX ORDER — POTASSIUM CHLORIDE 7.45 MG/ML
10 INJECTION INTRAVENOUS
Status: COMPLETED | OUTPATIENT
Start: 2020-12-24 | End: 2020-12-24

## 2020-12-24 RX ORDER — MEPERIDINE HYDROCHLORIDE 25 MG/ML
12.5 INJECTION INTRAMUSCULAR; INTRAVENOUS; SUBCUTANEOUS
Status: DISCONTINUED | OUTPATIENT
Start: 2020-12-24 | End: 2020-12-24 | Stop reason: HOSPADM

## 2020-12-24 RX ORDER — LABETALOL HYDROCHLORIDE 5 MG/ML
10 INJECTION, SOLUTION INTRAVENOUS EVERY 6 HOURS PRN
Status: DISCONTINUED | OUTPATIENT
Start: 2020-12-24 | End: 2020-12-29 | Stop reason: HOSPADM

## 2020-12-24 RX ORDER — OXYCODONE HCL 5 MG/5 ML
5 SOLUTION, ORAL ORAL
Status: DISCONTINUED | OUTPATIENT
Start: 2020-12-24 | End: 2020-12-24 | Stop reason: HOSPADM

## 2020-12-24 RX ORDER — OXYCODONE HCL 5 MG/5 ML
10 SOLUTION, ORAL ORAL
Status: DISCONTINUED | OUTPATIENT
Start: 2020-12-24 | End: 2020-12-24 | Stop reason: HOSPADM

## 2020-12-24 RX ADMIN — DIBASIC SODIUM PHOSPHATE, MONOBASIC POTASSIUM PHOSPHATE AND MONOBASIC SODIUM PHOSPHATE 250 MG: 852; 155; 130 TABLET ORAL at 05:19

## 2020-12-24 RX ADMIN — SODIUM CHLORIDE: 9 INJECTION, SOLUTION INTRAVENOUS at 18:42

## 2020-12-24 RX ADMIN — DIBASIC SODIUM PHOSPHATE, MONOBASIC POTASSIUM PHOSPHATE AND MONOBASIC SODIUM PHOSPHATE 250 MG: 852; 155; 130 TABLET ORAL at 19:42

## 2020-12-24 RX ADMIN — POTASSIUM CHLORIDE 10 MEQ: 7.46 INJECTION, SOLUTION INTRAVENOUS at 18:43

## 2020-12-24 RX ADMIN — GABAPENTIN 100 MG: 100 CAPSULE ORAL at 12:44

## 2020-12-24 RX ADMIN — PROPOFOL 70 MG: 10 INJECTION, EMULSION INTRAVENOUS at 16:23

## 2020-12-24 RX ADMIN — GABAPENTIN 100 MG: 100 CAPSULE ORAL at 05:19

## 2020-12-24 RX ADMIN — SODIUM CHLORIDE, POTASSIUM CHLORIDE, SODIUM LACTATE AND CALCIUM CHLORIDE: 600; 310; 30; 20 INJECTION, SOLUTION INTRAVENOUS at 16:18

## 2020-12-24 RX ADMIN — PIPERACILLIN AND TAZOBACTAM 3.38 G: 3; .375 INJECTION, POWDER, LYOPHILIZED, FOR SOLUTION INTRAVENOUS; PARENTERAL at 12:43

## 2020-12-24 RX ADMIN — POTASSIUM CHLORIDE 10 MEQ: 7.46 INJECTION, SOLUTION INTRAVENOUS at 21:42

## 2020-12-24 RX ADMIN — PROPOFOL 30 MG: 10 INJECTION, EMULSION INTRAVENOUS at 16:25

## 2020-12-24 RX ADMIN — POTASSIUM CHLORIDE 10 MEQ: 7.46 INJECTION, SOLUTION INTRAVENOUS at 19:42

## 2020-12-24 RX ADMIN — POTASSIUM CHLORIDE 10 MEQ: 7.46 INJECTION, SOLUTION INTRAVENOUS at 06:01

## 2020-12-24 RX ADMIN — GABAPENTIN 100 MG: 100 CAPSULE ORAL at 18:43

## 2020-12-24 RX ADMIN — POTASSIUM CHLORIDE 10 MEQ: 7.46 INJECTION, SOLUTION INTRAVENOUS at 05:19

## 2020-12-24 RX ADMIN — POTASSIUM CHLORIDE 10 MEQ: 7.46 INJECTION, SOLUTION INTRAVENOUS at 04:25

## 2020-12-24 RX ADMIN — PIPERACILLIN AND TAZOBACTAM 3.38 G: 3; .375 INJECTION, POWDER, LYOPHILIZED, FOR SOLUTION INTRAVENOUS; PARENTERAL at 20:48

## 2020-12-24 RX ADMIN — POTASSIUM CHLORIDE 10 MEQ: 7.46 INJECTION, SOLUTION INTRAVENOUS at 02:50

## 2020-12-24 RX ADMIN — POTASSIUM CHLORIDE 10 MEQ: 7.46 INJECTION, SOLUTION INTRAVENOUS at 22:49

## 2020-12-24 RX ADMIN — HEPARIN SODIUM 5000 UNITS: 5000 INJECTION, SOLUTION INTRAVENOUS; SUBCUTANEOUS at 08:16

## 2020-12-24 ASSESSMENT — ENCOUNTER SYMPTOMS
BLURRED VISION: 0
MUSCULOSKELETAL NEGATIVE: 1
BACK PAIN: 0
RESPIRATORY NEGATIVE: 1
NAUSEA: 0
SPEECH CHANGE: 0
WEAKNESS: 1
VOMITING: 0
FEVER: 0
EYES NEGATIVE: 1
SHORTNESS OF BREATH: 0
SENSORY CHANGE: 0
ABDOMINAL PAIN: 1
CHILLS: 0
CARDIOVASCULAR NEGATIVE: 1
FOCAL WEAKNESS: 0
COUGH: 0
NERVOUS/ANXIOUS: 0
PALPITATIONS: 0
MYALGIAS: 0
HEADACHES: 0
SORE THROAT: 0
DIARRHEA: 1
NECK PAIN: 0
DIZZINESS: 0
FLANK PAIN: 0
HEARTBURN: 0
CONSTIPATION: 0
NEUROLOGICAL NEGATIVE: 1
WHEEZING: 0
PSYCHIATRIC NEGATIVE: 1
DIARRHEA: 0

## 2020-12-24 ASSESSMENT — PAIN DESCRIPTION - PAIN TYPE: TYPE: SURGICAL PAIN

## 2020-12-24 ASSESSMENT — PAIN SCALES - GENERAL: PAIN_LEVEL: 0

## 2020-12-24 NOTE — FACE TO FACE
Face to Face Supporting Documentation - Home Health    The encounter with this patient was in whole or in part the primary reason for home health admission.    Date of encounter:   Patient:                    MRN:                       YOB: 2022  Casey Arroyo  [id number]  1950     Home health to see patient for:  Skilled Nursing care for assessment, interventions & education and Wound Care    Skilled need for:  Surgical Aftercare Ileostomy reversal    Skilled nursing interventions to include:  Wound Care    Homebound status evidenced by:  Needs the assistance of another person in order to leave the home. Leaving home requires a considerable and taxing effort. There is a normal inability to leave the home.    Community Physician to provide follow up care: Isidor Maldonado M.D.     Optional Interventions? No      I certify the face to face encounter for this home health care referral meets the CMS requirements and the encounter/clinical assessment with the patient was, in whole, or in part, for the medical condition(s) listed above, which is the primary reason for home health care. Based on my clinical findings: the service(s) are medically necessary, support the need for home health care, and the homebound criteria are met.  I certify that this patient has had a face to face encounter by myself.  Ángel Keenan M.D. - NPI: [id number]

## 2020-12-24 NOTE — PROGRESS NOTES
Giorgi from Lab called with critical result of Potassium at 0228. Critical lab result read back to Giorgi.   Dr. Rios notified of critical lab result at 0233.  Critical lab result read back by Dr. Rios.

## 2020-12-24 NOTE — WOUND TEAM
[facility]  Inpatient Services  Initial Wound and Skin Care Evaluation    Admission Date: 12/18/2020     Last order of IP CONSULT TO WOUND CARE was found on 12/21/2020 from Hospital Encounter on 12/18/2020       HPI, PMH, SH: Reviewed    Unit where seen by Wound Team: [id number]     WOUND CONSULT/FOLLOW UP RELATED TO:  Right LQ abdomen    Self Report / Pain Level:  0/10, pre-medicated with morphine prior to wound vac placement.       OBJECTIVE:  Pt lying in bed with intact dressing in place. Preventable measures in place, pressure redistribution mattress.    WOUND TYPE, LOCATION, CHARACTERISTICS (Pressure Injuries: location, stage, POA or date identified)  Wound 12/15/20 Incision Chest Right dermabond (Active)   Site Assessment Clean;Dry;Intact 12/23/20 0820   Periwound Assessment Clean;Dry;Intact 12/23/20 0820   Margins Attached edges 12/23/20 0820   Closure Dermabond 12/23/20 0820   Drainage Amount None 12/22/20 2000   Wound Cleansing Not Applicable 12/22/20 2000   Dressing Options Open to Air 12/23/20 0820   Dressing Changed Observed 12/22/20 0930   Dressing Status Open to Air 12/22/20 2000   Number of days: 8       Wound 12/15/20 Incision Abdomen Right 4x4 and tegaderm (Active)   Wound Image   12/22/20 1000   Site Assessment Red;Pink;White 12/23/20 1500   Periwound Assessment Clean;Dry;Intact 12/23/20 1500   Margins Defined edges 12/23/20 1500   Closure Secondary intention 12/23/20 1500   Drainage Amount Copious 12/23/20 1500   Drainage Description Serosanguineous 12/23/20 1500   Treatments Cleansed;Irrigation;Site care 12/23/20 1500   Wound Cleansing Approved Wound Cleanser 12/23/20 1500   Periwound Protectant Skin Protectant Wipes to Periwound;Drape 12/23/20 1500   Dressing Cleansing/Solutions Not Applicable 12/23/20 1500   Dressing Options Wound Vac 12/23/20 1500   Dressing Changed New 12/23/20 1500   Dressing Status Clean;Dry;Intact 12/23/20 1500   Dressing Change/Treatment Frequency By Wound  Team Only 12/23/20 1500   NEXT Dressing Change/Treatment Date 12/26/20 12/23/20 1500   NEXT Weekly Photo (Inpatient Only) 12/30/20 12/23/20 1500   Non-staged Wound Description Full thickness 12/23/20 1500   Wound Length (cm) 5.5 cm 12/22/20 1000   Wound Width (cm) 6.1 cm 12/22/20 1000   Wound Depth (cm) 2 cm 12/22/20 1000   Wound Surface Area (cm^2) 33.55 cm^2 12/22/20 1000   Wound Volume (cm^3) 67.1 cm^3 12/22/20 1000   Tunneling (cm) 13 cm 12/22/20 1000   Tunneling Clock Position of Wound 2 12/22/20 1000   Shape Circular 12/23/20 1500   Wound Odor None 12/23/20 1500   Exposed Structures Adipose;Fascia 12/23/20 1500   WOUND NURSE ONLY - Time Spent with Patient (mins) 45 12/23/20 1500   Number of days: 8          Vascular:    LEE ANN:   No results found.      Lab Values:    Lab Results   Component Value Date/Time    WBC 4.7 (L) 12/23/2020 05:09 AM    RBC 2.84 (L) 12/23/2020 05:09 AM    HEMOGLOBIN 9.6 (L) 12/23/2020 05:09 AM    HEMATOCRIT 30.0 (L) 12/23/2020 05:09 AM    CREACTPROT 15.80 (H) 12/20/2020 02:02 PM          Culture: none  Culture Results show:  No results found for this or any previous visit (from the past 720 hour(s)).      INTERVENTIONS BY WOUND TEAM:  Chart reviewed.  Patient's dressings removed.  Wound bed cleansed with wound cleanser and patted dry with gauze.  Effie-wound protected with no sting skin prep and drape.  1 black foam to the depth of the tract ~6-8cm deep, and 1 black foam beveled for the wound bed and TRAC pad.  NPWT started at 125mmHg, no leaks noted.     Interdisciplinary consultation: Patient, Bedside RN (Erin)    EVALUATION: Patient had a ileostomy reversal done by Dr. Aquino on 12/15, discharged next day.  Patietn developed increased abdominal distention and erythema to the abdomen.  Patient came in for evaluation, found to have a fluid collection, concern for abscess at the presacral area.  Wet to dry to help to wick drainage from abdominal site.  Clear serosanguinous drainage from  old ostomy site.   Patient going to IR today for drainage of abscess.  If it does not clear the drainage at the ostomy site, wound team will place wound VAC to the ostomy site for drainage control and help with closure if cleared by Dr. Aquino.    12/23: Wound VAC placed.  IR to place drain tube.     Goals: Steady decrease in wound area and depth weekly.    NURSING PLAN OF CARE ORDERS (X):    Dressing changes: See Dressing Care orders: x  Skin care: See Skin Care orders:   Rectal tube care: See Rectal Tube Care orders:   Other orders:    WOUND TEAM PLAN OF CARE   Dressing changes by wound team:          Follow up 1-2 times weekly:               Follow up 3 times weekly:                NPWT change 3 times weekly:  x   Follow up as needed:       Other (explain):     Anticipated discharge plans:  LTACH:        SNF/Rehab:                  Home Care:     X, 2x weekly , patient can be seen with home health 3x weekly until wound clinic has open appointment for wound VAC.     Outpatient Wound Center:    X 1x weekly        Self Care:

## 2020-12-24 NOTE — DISCHARGE PLANNING
Care Transition Team Discharge Planning    Anticipates discharge disposition:  • Home with Home Health    Action:  • This RN CM is following the case . Spoke with Kaleb , Pt's wife and explained to her that the plan is to discharge Pt to home with home with Home Health,.  • Kaleb agreed to send the referral to [facility].  • Choice was faxed to Lucy MARTÍNEZ,.    Barriers to Discharge:  • Pending Medical Clearance  • Pending  acceptance, Acceptance to [facility] for wound vac    Plan:  • Will continue to assist Pt with discharge as needed.

## 2020-12-24 NOTE — OR NURSING
Pt to pre-op, was here previously and procedure delayed d/t PO intake at 0730.    VS taken, IV flushed, last PO 0730.

## 2020-12-24 NOTE — PROGRESS NOTES
Patient underwent a retroperitoneal drain placement by Dr. Zheng, assisted by Carroll KELLEY. Patient, this RN and MD signed consent prior to sedation medication     MD Zheng verified correct site using imaging guidance. Patient was placed in a prone position. Vitals were taken every 5 minutes and remained stable during procedure (see doc flow sheet for results). CO2 waveform capnography was monitored and remained 14-28 throughout procedure. Patient appeared to tolerate procedure well, all sedation medication given at discretion of MD Zheng. A percustay, gauze and medipore tape dressing was placed over left buttock posterior surgical site. Drain to bulb suction, small amount of light pink fluid noted in bulb. Report called to Serafin JO. Pt transported by bed with 2 ACLS RN to [facility] 407-2. 16 mls red fluid aspirated from drain site and sent to microbiology, hand delivered by Carroll KELLEY. During bedside handoff with Serafin JO, patient is awake and talking, port op orders were released in [facility]     [employer] Flexima APDL Locking Pigtail Drainage Catheter System   10Fr x 25cm     Ref# [id number] Lot# [id number] Exp Date 10/16/2023

## 2020-12-24 NOTE — CONSULTS
Gastroenterology Consult Note:    FAWAD Mathews  Date & Time note created:    12/24/2020   8:52 AM     Referring MD:  Dr. Casey Aquino    Patient ID:  Name:             Casey Arroyo   YOB: 1950  Age:                 70 y.o.  male   MRN:               [id number]                                                             Reason for Consult:      Diarrhea, Question of anastomotic stricture     History of Present Illness:    He is a 70-year-old male patient seen in consultation for diarrhea and possible anastomotic stricture.  We were asked to see the patient by Dr. Casey Aquino, the patient's colon surgeon.  The patient has a history of rectal cancer diagnosed earlier this year status post left hemicolectomy and temporary ileostomy formation.  He just recently underwent loop ileostomy reversal, and 2 days later he was admitted with fevers, abdominal/pelvic cellulitis, abdominal distention, and poor p.o. intake.  CT scan demonstrated 3.5 cm presacral fluid collection and mild enhancement concerning for abscess as well as increased stool in the cecal area.  Throughout his time in the hospital he had undergone an attempted drainage of the sacral fluid collection, and now currently has wound VAC in this area.    Over the course of his hospitalization he is continued to have significant abdominal distention without much improvement.  He is also having liquid stools and passing flatus, but no significant movement of his bowels despite use of MiraLAX.  Dr. Aquino, his colon surgeon, contacted us as he is concerned there may be a stricture at the anastomosis preventing evacuation.    Review of Systems:      Review of Systems   Constitutional: Positive for malaise/fatigue.   HENT: Negative.    Eyes: Negative.    Respiratory: Negative.    Cardiovascular: Negative.    Gastrointestinal: Positive for abdominal pain and diarrhea.   Genitourinary: Negative.    Musculoskeletal: Negative.    Skin:  Negative.    Neurological: Negative.    Endo/Heme/Allergies: Negative.    Psychiatric/Behavioral: Negative.    All other systems reviewed and are negative.            Past Medical History:   Past Medical History:   Diagnosis Date   • Cancer (HCC) 02/04/2020    rectal cancer   • Cataract 2019    bilat iol   • Hypertension    • Snoring        Past Surgical History:  Past Surgical History:   Procedure Laterality Date   • ILEOSTOMY  12/15/2020    Procedure: CREATION, ILEOSTOMY - FOR CLOSURE OF DIVERTING LOOP ILEOSTOMY;  Surgeon: Casey Aquino M.D.;  Location: SURGERY [facility];  Service: General   • CATH PLACEMENT Right 12/15/2020    Procedure: INSERTION, CATHETER/Medi Port removal;  Surgeon: Casey Aquino M.D.;  Location: SURGERY [facility];  Service: General   • PB COLONOSCOPY,DIAGNOSTIC  2/19/2020    Procedure: COLONOSCOPY;  Surgeon: Carlyle Gonsalez M.D.;  Location: [facility];  Service: Gastroenterology   • COLONOSCOPY FLEX W/ENDO US  2/19/2020    Procedure: COLONOSCOPY, FLEXIBLE, WITH ENDOSCOPIC US-LOWER RADIAL;  Surgeon: Carlyle Gonsalez M.D.;  Location: SURGERY [facility];  Service: Gastroenterology   • COLONOSCOPY  02/04/2020   • ILEOSTOMY  2020   • OTHER  2020    port placement R chest   • APPENDECTOMY  1966   • CATARACT EXTRACTION WITH IOL Bilateral        Hospital Medications:    Current Facility-Administered Medications:   •  phosphorus (K-Phos-Neutral) per tablet 250 mg, 1 Tab, Oral, BID, Ángel Keenan M.D., 250 mg at 12/24/20 0519  •  gabapentin (NEURONTIN) capsule 100 mg, 100 mg, Oral, TID, Ángel Keenan M.D., 100 mg at 12/24/20 0519  •  [DISCONTINUED] senna-docusate (PERICOLACE or SENOKOT S) 8.6-50 MG per tablet 2 Tab, 2 Tab, Oral, BID **AND** polyethylene glycol/lytes (MIRALAX) PACKET 1 Packet, 1 Packet, Oral, BID, 1 Packet at 12/23/20 2100 **AND** [DISCONTINUED] magnesium hydroxide (MILK OF MAGNESIA) suspension 30 mL, 30 mL, Oral, QDAY PRN **AND**  [DISCONTINUED] bisacodyl (DULCOLAX) suppository 10 mg, 10 mg, Rectal, QDAY PRN, Giuseppe Koo M.D.  •  oxyCODONE-acetaminophen (PERCOCET) 5-325 MG per tablet 1 Tab, 1 Tab, Oral, Q4HRS PRN, Zackary Shafer M.D., 1 Tab at 12/21/20 1453  •  ondansetron (ZOFRAN ODT) dispertab 4 mg, 4 mg, Oral, Q4HRS PRN, Zackary Shafer M.D., 4 mg at 12/20/20 2114  •  morphine (pf) 4 mg/mL injection 1 mg, 1 mg, Intravenous, Q4HRS PRN, Kanwal Null A.P.R.N., 1 mg at 12/23/20 1418  •  NS infusion, , Intravenous, Continuous, Ángel Keenan M.D., Last Rate: 75 mL/hr at 12/24/20 0816, Rate Change at 12/24/20 0816  •  heparin injection 5,000 Units, 5,000 Units, Subcutaneous, Q8HRS, Giuseppe Koo M.D., 5,000 Units at 12/24/20 0816  •  acetaminophen (Tylenol) tablet 650 mg, 650 mg, Oral, Q6HRS PRN, Giuseppe Koo M.D., 650 mg at 12/19/20 1600    Current Outpatient Medications:  Current Facility-Administered Medications   Medication Dose Route Frequency Provider Last Rate Last Admin   • phosphorus (K-Phos-Neutral) per tablet 250 mg  1 Tab Oral BID Ángel Keenan M.D.   250 mg at 12/24/20 0519   • gabapentin (NEURONTIN) capsule 100 mg  100 mg Oral TID Ángel Keenan M.D.   100 mg at 12/24/20 0519   • polyethylene glycol/lytes (MIRALAX) PACKET 1 Packet  1 Packet Oral BID Casey Aquino M.D.   1 Packet at 12/23/20 2100   • oxyCODONE-acetaminophen (PERCOCET) 5-325 MG per tablet 1 Tab  1 Tab Oral Q4HRS PRN Zackary Shafer M.D.   1 Tab at 12/21/20 1453   • ondansetron (ZOFRAN ODT) dispertab 4 mg  4 mg Oral Q4HRS PRN Zackary Shafer M.D.   4 mg at 12/20/20 2114   • morphine (pf) 4 mg/mL injection 1 mg  1 mg Intravenous Q4HRS PRN RE ValenzuelaRRomeNRome   1 mg at 12/23/20 1418   • NS infusion   Intravenous Continuous Ángel Keenan M.D. 75 mL/hr at 12/24/20 0816 Rate Change at 12/24/20 0816   • heparin injection 5,000 Units  5,000 Units Subcutaneous Q8HRS Giuseppe Koo M.D.   5,000 Units at 12/24/20 0816   • acetaminophen (Tylenol)  "tablet 650 mg  650 mg Oral Q6HRS PRN Giuseppe Koo M.D.   650 mg at 12/19/20 1600       Medication Allergy:  No Known Allergies    Family History:  No family history on file.    Social History:  Social History     Socioeconomic History   • Marital status:      Spouse name: Not on file   • Number of children: Not on file   • Years of education: Not on file   • Highest education level: Not on file   Occupational History   • Not on file   Social Needs   • Financial resource strain: Not on file   • Food insecurity     Worry: Not on file     Inability: Not on file   • Transportation needs     Medical: Not on file     Non-medical: Not on file   Tobacco Use   • Smoking status: Never Smoker   • Smokeless tobacco: Never Used   Substance and Sexual Activity   • Alcohol use: Yes     Frequency: 4 or more times a week     Drinks per session: 3 or 4     Comment: 3/day   • Drug use: Never   • Sexual activity: Not on file   Lifestyle   • Physical activity     Days per week: Not on file     Minutes per session: Not on file   • Stress: Not on file   Relationships   • Social connections     Talks on phone: Not on file     Gets together: Not on file     Attends Yarsani service: Not on file     Active member of club or organization: Not on file     Attends meetings of clubs or organizations: Not on file     Relationship status: Not on file   • Intimate partner violence     Fear of current or ex partner: Not on file     Emotionally abused: Not on file     Physically abused: Not on file     Forced sexual activity: Not on file   Other Topics Concern   • Not on file   Social History Narrative   • Not on file         Physical Exam:  Vitals/ General Appearance:   Weight/BMI: Body mass index is 30.31 kg/m².  /75   Pulse 74   Temp 36.4 °C (97.6 °F) (Temporal)   Resp 16   Ht 1.753 m (5' 9\")   Wt 93.1 kg (205 lb 4 oz)   SpO2 97%   Vitals:    12/23/20 1800 12/23/20 2140 12/24/20 0005 12/24/20 0322   BP: 134/75 122/74 124/68 " 148/75   Pulse: 87 77 75 74   Resp: 18 16 16 16   Temp: 37.4 °C (99.4 °F) 36.8 °C (98.2 °F) 36.6 °C (97.8 °F) 36.4 °C (97.6 °F)   TempSrc: Temporal Temporal Temporal Temporal   SpO2: 93% 93% 93% 97%   Weight:       Height:         Oxygen Therapy:  Pulse Oximetry: 97 %, O2 (LPM): 0, O2 Delivery Device: None - Room Air    Physical Exam   Constitutional: He is oriented to person, place, and time and well-developed, well-nourished, and in no distress. No distress.   HENT:   Head: Normocephalic and atraumatic.   Right Ear: External ear normal.   Left Ear: External ear normal.   Nose: Nose normal.   Mouth/Throat: Oropharynx is clear and moist.   Eyes: Pupils are equal, round, and reactive to light. Conjunctivae and EOM are normal.   Neck: Neck supple. No tracheal deviation present. No thyromegaly present.   Cardiovascular: Normal rate, regular rhythm, normal heart sounds and intact distal pulses.   No murmur heard.  Pulmonary/Chest: Effort normal and breath sounds normal. No respiratory distress. He has no wheezes. He has no rales.   Abdominal: Bowel sounds are normal. He exhibits distension (DIffuse). There is abdominal tenderness (RLQ near wound vac).   Wound vac to RLQ with appropriate suction and intact   Musculoskeletal: Normal range of motion.         General: No deformity or edema.   Neurological: He is alert and oriented to person, place, and time. GCS score is 15.   Skin: Skin is warm and dry. No rash noted. He is not diaphoretic. No erythema.   Psychiatric: Mood, memory, affect and judgment normal.       MDM (Data Review):     Records reviewed and summarized in current documentation    Lab Data Review:  Recent Results (from the past 24 hour(s))   CBC WITH DIFFERENTIAL    Collection Time: 12/24/20  1:31 AM   Result Value Ref Range    WBC 4.4 (L) 4.8 - 10.8 K/uL    RBC 2.75 (L) 4.70 - 6.10 M/uL    Hemoglobin 9.3 (L) 14.0 - 18.0 g/dL    Hematocrit 28.6 (L) 42.0 - 52.0 %    .0 (H) 81.4 - 97.8 fL    MCH 33.8  (H) 27.0 - 33.0 pg    MCHC 32.5 (L) 33.7 - 35.3 g/dL    RDW 50.8 (H) 35.9 - 50.0 fL    Platelet Count 171 164 - 446 K/uL    MPV 9.4 9.0 - 12.9 fL    Neutrophils-Polys 68.50 44.00 - 72.00 %    Lymphocytes 11.80 (L) 22.00 - 41.00 %    Monocytes 13.40 0.00 - 13.40 %    Eosinophils 5.00 0.00 - 6.90 %    Basophils 0.20 0.00 - 1.80 %    Immature Granulocytes 1.10 (H) 0.00 - 0.90 %    Nucleated RBC 0.00 /100 WBC    Neutrophils (Absolute) 3.02 1.82 - 7.42 K/uL    Lymphs (Absolute) 0.52 (L) 1.00 - 4.80 K/uL    Monos (Absolute) 0.59 0.00 - 0.85 K/uL    Eos (Absolute) 0.22 0.00 - 0.51 K/uL    Baso (Absolute) 0.01 0.00 - 0.12 K/uL    Immature Granulocytes (abs) 0.05 0.00 - 0.11 K/uL    NRBC (Absolute) 0.00 K/uL   Basic Metabolic Panel    Collection Time: 12/24/20  1:31 AM   Result Value Ref Range    Sodium 139 135 - 145 mmol/L    Potassium 2.7 (LL) 3.6 - 5.5 mmol/L    Chloride 110 96 - 112 mmol/L    Co2 22 20 - 33 mmol/L    Glucose 98 65 - 99 mg/dL    Bun 11 8 - 22 mg/dL    Creatinine 1.10 0.50 - 1.40 mg/dL    Calcium 8.7 8.5 - 10.5 mg/dL    Anion Gap 7.0 7.0 - 16.0   MAGNESIUM    Collection Time: 12/24/20  1:31 AM   Result Value Ref Range    Magnesium 2.0 1.5 - 2.5 mg/dL   PHOSPHORUS    Collection Time: 12/24/20  1:31 AM   Result Value Ref Range    Phosphorus 2.2 (L) 2.5 - 4.5 mg/dL   ESTIMATED GFR    Collection Time: 12/24/20  1:31 AM   Result Value Ref Range    GFR If African American >60 >60 mL/min/1.73 m 2    GFR If Non African American >60 >60 mL/min/1.73 m 2       Imaging/Procedures Review:    DX-CHEST-PORTABLE (1 VIEW)   Final Result      Hypoinflation with bibasilar opacities, atelectasis and/or pneumonitis.      CT-ABDOMEN-PELVIS WITH   Final Result         1.  Presacral mildly enhancing fluid collection, appearance suggests abscess.   2.  Large quantity of stool in the cecum favors changes of constipation. Component of evolving Natalia's or ileus not excluded. Recommend radiographic follow-up to resolution   3.   Bladder wall thickening, consider changes of cystitis, correlate with urinalysis   4.  Bilateral fat-containing inguinal hernias   5.  Small layering left pleural effusion   6.  Fat-containing umbilical hernia   7.  Atherosclerosis and atherosclerotic coronary artery disease.      CT-DRAIN-RETROPERITONEAL    (Results Pending)         MDM (Assessment and Plan):     Patient Active Problem List    Diagnosis Date Noted   • History of reversal of ileostomy 12/22/2020     Priority: High   • Pelvic fluid collection 12/22/2020     Priority: High   • CRISTHIAN (acute kidney injury) (HCC) 12/20/2020     Priority: High   • Sepsis (HCC) 12/19/2020     Priority: High   • Abdominal wall cellulitis 12/18/2020     Priority: High   • Hypophosphatemia 12/23/2020     Priority: Medium   • Thrombocytopenia (HCC) 12/22/2020     Priority: Medium   • Hypokalemia 12/19/2020     Priority: Medium   • Hypertension 12/18/2020     Priority: Medium   • Macrocytic anemia 12/18/2020     Priority: Medium     Problems:  1.  Abdominal distention  2.  Diarrhea  3.  Abdominal wall cellulitis with pelvic fluid collection  4.  Acute kidney injury  5.  Sepsis  6.  History of rectal cancer with recent ileostomy reversal    Plan:  1.  Flexible sigmoidoscopy possible balloon dilation of anastomotic stricture with Dr. Quinn Sawyer at 1 PM today.  The risk and benefits of the procedure, including but not limited to, bleeding, perforation, infection, respiratory decompensation, cardiovascular decompensation, need for surgical intervention, prolonged hospitalization, or death were discussed with the patient.  He verbalizes understanding and wishes to proceed forward with plan of care.    2.  N.p.o.    3.  Hold any anticoagulants    Thank your for the opportunity to assist in the care of your patient.  Please call for any questions or concerns.    GUSTABO Mathews.

## 2020-12-24 NOTE — PROGRESS NOTES
Pt is A&O x4, calls appropriately  Pain 1/10   Denies nausea  Tolerating a full liquid diet   Incision to LUQ with CDI wound vac  IR Drain to L buttock, CDI  + Voids  + flatus  Last BM 12/23  Up SBA  Bed alarm off, pt no fall risk per danis justice  Reviewed plan of care with patient, bed in lowest position and locked, pt resting comfortably now, call light within reach, all needs met at this time. Interventions will be executed per plan of care

## 2020-12-24 NOTE — OR SURGEON
Immediate Post- Operative Note        PostOp Diagnosis: presacral fluid collection      Procedure(s): CT drain      Estimated Blood Loss: Less than 5 ml        Complications: None            12/23/2020     4:03 PM     Faizan Zheng M.D.

## 2020-12-24 NOTE — PROGRESS NOTES
[facility] Medicine Daily Progress Note    Date of Service  12/24/2020    Chief Complaint  70 y.o. male admitted 12/18/2020 with abdominal wall cellulitis.    Hospital Course  Admitted with abdominal wall cellulitis and pelvic fluid collection which was noted on CT scan of abdomen and pelvis.  He had recent ileostomy reversal, he has history of robotic-assisted laparoscopic low anterior resection with creation of a diverting loop ileostomy.  He was placed on IV vancomycin and Zosyn. Surgery was consulted on the case.  He developed acute kidney injury, and IV vancomycin was stopped.  The wound VAC was placed for the abdominal wall wound.  CT-guided drain was placed for the pelvic fluid collection.    Interval Problem Update  Abdominal wall cellulitis - wound vac placed  Pelvic fluid collection - CT-guided drain placed  CRISTHIAN - crea 1.1  HTN - sbp 120-160  Low potassium and phosphorus    Consultants/Specialty  Surgery     Code Status  Full Code    Disposition  TBD    Review of Systems  Review of Systems   Constitutional: Negative for chills, fever and malaise/fatigue.   HENT: Negative for congestion, hearing loss and sore throat.    Eyes: Negative for blurred vision.   Respiratory: Negative for cough, shortness of breath and wheezing.    Cardiovascular: Negative for chest pain and palpitations.   Gastrointestinal: Positive for abdominal pain. Negative for constipation, diarrhea, heartburn, nausea and vomiting.   Genitourinary: Negative for dysuria, flank pain and hematuria.   Musculoskeletal: Negative for back pain, joint pain, myalgias and neck pain.   Skin: Negative for rash.   Neurological: Positive for weakness. Negative for dizziness, sensory change, speech change, focal weakness and headaches.   Psychiatric/Behavioral: The patient is not nervous/anxious.         Physical Exam  Temp:  [36.4 °C (97.6 °F)-37.4 °C (99.4 °F)] 36.4 °C (97.6 °F)  Pulse:  [74-98] 74  Resp:  [12-20] 16  BP: (122-166)/(68-87) 148/75  SpO2:   [92 %-97 %] 97 %    Physical Exam  Vitals signs and nursing note reviewed.   Constitutional:       Appearance: He is obese.   HENT:      Head: Normocephalic and atraumatic.      Nose: No congestion.      Mouth/Throat:      Mouth: Mucous membranes are dry.   Eyes:      Extraocular Movements: Extraocular movements intact.      Conjunctiva/sclera: Conjunctivae normal.      Pupils: Pupils are equal, round, and reactive to light.   Neck:      Musculoskeletal: No muscular tenderness.   Cardiovascular:      Rate and Rhythm: Normal rate and regular rhythm.   Pulmonary:      Effort: Pulmonary effort is normal.      Breath sounds: Normal breath sounds.   Abdominal:      General: There is distension.      Tenderness: There is abdominal tenderness. There is no guarding or rebound.      Comments: Wound vac   Musculoskeletal:      Right lower leg: No edema.      Left lower leg: No edema.   Skin:     Findings: Erythema (abdominal wall) present. Wound: RLQ abdomen.   Neurological:      General: No focal deficit present.      Mental Status: He is alert and oriented to person, place, and time. Mental status is at baseline.         Fluids    Intake/Output Summary (Last 24 hours) at 12/24/2020 1014  Last data filed at 12/24/2020 0322  Gross per 24 hour   Intake --   Output 215 ml   Net -215 ml       Laboratory  Recent Labs     12/22/20  0124 12/23/20  0509 12/24/20  0131   WBC 4.6* 4.7* 4.4*   RBC 2.75* 2.84* 2.75*   HEMOGLOBIN 9.2* 9.6* 9.3*   HEMATOCRIT 29.2* 30.0* 28.6*   .2* 105.6* 104.0*   MCH 33.5* 33.8* 33.8*   MCHC 31.5* 32.0* 32.5*   RDW 52.1* 51.6* 50.8*   PLATELETCT 129* 174 171   MPV 10.6 10.0 9.4     Recent Labs     12/22/20  0124 12/23/20  0509 12/24/20  0131   SODIUM 135 136 139   POTASSIUM 3.7 3.0* 2.7*   CHLORIDE 108 111 110   CO2 20 19* 22   GLUCOSE 101* 93 98   BUN 18 13 11   CREATININE 1.42* 1.26 1.10   CALCIUM 8.3* 8.5 8.7     Recent Labs     12/23/20  0800   INR 1.07                Imaging  CT-DRAIN-RETROPERITONEAL   Final Result      Successful presacral pelvic drainage tube placement.      Plan: Twice daily flushes with 10 mL of sterile saline. Monitor outputs. Please contact interventional radiology if there is any concern for tube dysfunction.         DX-CHEST-PORTABLE (1 VIEW)   Final Result      Hypoinflation with bibasilar opacities, atelectasis and/or pneumonitis.      CT-ABDOMEN-PELVIS WITH   Final Result         1.  Presacral mildly enhancing fluid collection, appearance suggests abscess.   2.  Large quantity of stool in the cecum favors changes of constipation. Component of evolving Natalia's or ileus not excluded. Recommend radiographic follow-up to resolution   3.  Bladder wall thickening, consider changes of cystitis, correlate with urinalysis   4.  Bilateral fat-containing inguinal hernias   5.  Small layering left pleural effusion   6.  Fat-containing umbilical hernia   7.  Atherosclerosis and atherosclerotic coronary artery disease.           Assessment/Plan  * Abdominal wall cellulitis- (present on admission)  Assessment & Plan  IV Zosyn  Wound care    Pelvic fluid collection- (present on admission)  Assessment & Plan  IV Zosyn  CT guided drain placement 12/23/2020  Follow culture    History of reversal of ileostomy- (present on admission)  Assessment & Plan  Full liquids  Bowel protocol    CRISTHIAN (acute kidney injury) (HCC)- (present on admission)  Assessment & Plan  IVF NS, follow bmp    Sepsis (HCC)- (present on admission)  Assessment & Plan  This is Sepsis Present on admission  SIRS criteria identified on admission include: Fever, with temperature greater than 101 deg F and Tachypnea, with respirations greater than 20 per minute  Source is Cellulitis, Abscess  Sepsis protocol initiated  Fluid resuscitation ordered per protocol  IV antibiotics as appropriate for source of sepsis  While organ dysfunction may be noted elsewhere in this problem list or in the chart, degree  of organ dysfunction does not meet CMS criteria for severe sepsis          Hypophosphatemia- (present on admission)  Assessment & Plan  Kphos, follow level    Thrombocytopenia (HCC)- (present on admission)  Assessment & Plan  Follow cbc    Hypokalemia- (present on admission)  Assessment & Plan  IV KCl  Repeat serum potassium today    Macrocytic anemia- (present on admission)  Assessment & Plan  Follow cbc    Hypertension- (present on admission)  Assessment & Plan  Hold Losartan   IV Labetalol as needed       VTE prophylaxis: Heparin

## 2020-12-24 NOTE — PROGRESS NOTES
"Surgery    Events  12/20: Less abdominal pain, less distention, less erythema  12/21:  Report continued BM.  Denies nausea or vomiting.  12/22:  Planned DRAINAGE OF ABSCESS CAVITY  12/23:  Wound vac placed and drain placed  Vitals  /71   Pulse 88   Temp 36.7 °C (98.1 °F) (Temporal)   Resp 16   Ht 1.753 m (5' 9\")   Wt 93.1 kg (205 lb 4 oz)   SpO2 92%   BMI 30.31 kg/m²     Exam  Alert, conversant  Less distention, less erythema  Bandage and packing replaced at ostomy site.    Labs  WBC normal  Hgb stable  Creatinine Stable    A/P)  Diet as tolerated  Continue ABx with drain cultures pending  FU Cx  Ambulate  Appreciate wound care reccs.  Will need HH for wound vac care  Add miralx BID for constipation  Casey Aquino MD PhD  [facility]  Colon and Rectal Surgeon  [phone]      Following along    Casey Aquino MD PhD  [facility]  Colon and Rectal Surgeon  [phone]  __________________________________________________________________  Patient:Casey Arroyo   MRN:[id number]   CSN:[id number]    12/20/2020    1:54 PM    "

## 2020-12-25 PROBLEM — E83.42 HYPOMAGNESEMIA: Status: ACTIVE | Noted: 2020-12-25

## 2020-12-25 LAB
ANION GAP SERPL CALC-SCNC: 10 MMOL/L (ref 7–16)
BASOPHILS # BLD AUTO: 0.5 % (ref 0–1.8)
BASOPHILS # BLD: 0.03 K/UL (ref 0–0.12)
BUN SERPL-MCNC: 8 MG/DL (ref 8–22)
CALCIUM SERPL-MCNC: 8.9 MG/DL (ref 8.5–10.5)
CHLORIDE SERPL-SCNC: 110 MMOL/L (ref 96–112)
CO2 SERPL-SCNC: 21 MMOL/L (ref 20–33)
CREAT SERPL-MCNC: 1.06 MG/DL (ref 0.5–1.4)
EOSINOPHIL # BLD AUTO: 0.22 K/UL (ref 0–0.51)
EOSINOPHIL NFR BLD: 3.5 % (ref 0–6.9)
ERYTHROCYTE [DISTWIDTH] IN BLOOD BY AUTOMATED COUNT: 49.7 FL (ref 35.9–50)
GLUCOSE SERPL-MCNC: 97 MG/DL (ref 65–99)
HCT VFR BLD AUTO: 33.9 % (ref 42–52)
HGB BLD-MCNC: 11.4 G/DL (ref 14–18)
IMM GRANULOCYTES # BLD AUTO: 0.09 K/UL (ref 0–0.11)
IMM GRANULOCYTES NFR BLD AUTO: 1.4 % (ref 0–0.9)
LYMPHOCYTES # BLD AUTO: 0.8 K/UL (ref 1–4.8)
LYMPHOCYTES NFR BLD: 12.6 % (ref 22–41)
MAGNESIUM SERPL-MCNC: 1.9 MG/DL (ref 1.5–2.5)
MCH RBC QN AUTO: 32.9 PG (ref 27–33)
MCHC RBC AUTO-ENTMCNC: 32.5 G/DL (ref 33.7–35.3)
MCV RBC AUTO: 101.5 FL (ref 81.4–97.8)
MONOCYTES # BLD AUTO: 0.74 K/UL (ref 0–0.85)
MONOCYTES NFR BLD AUTO: 11.6 % (ref 0–13.4)
NEUTROPHILS # BLD AUTO: 4.48 K/UL (ref 1.82–7.42)
NEUTROPHILS NFR BLD: 70.4 % (ref 44–72)
NRBC # BLD AUTO: 0 K/UL
NRBC BLD-RTO: 0 /100 WBC
PHOSPHATE SERPL-MCNC: 2.7 MG/DL (ref 2.5–4.5)
PLATELET # BLD AUTO: 228 K/UL (ref 164–446)
PMV BLD AUTO: 9.6 FL (ref 9–12.9)
POTASSIUM SERPL-SCNC: 3 MMOL/L (ref 3.6–5.5)
RBC # BLD AUTO: 3.4 M/UL (ref 4.7–6.1)
SODIUM SERPL-SCNC: 141 MMOL/L (ref 135–145)
WBC # BLD AUTO: 6.4 K/UL (ref 4.8–10.8)

## 2020-12-25 PROCEDURE — 700102 HCHG RX REV CODE 250 W/ 637 OVERRIDE(OP): Performed by: FAMILY MEDICINE

## 2020-12-25 PROCEDURE — 700111 HCHG RX REV CODE 636 W/ 250 OVERRIDE (IP): Performed by: FAMILY MEDICINE

## 2020-12-25 PROCEDURE — A9270 NON-COVERED ITEM OR SERVICE: HCPCS | Performed by: FAMILY MEDICINE

## 2020-12-25 PROCEDURE — 84100 ASSAY OF PHOSPHORUS: CPT

## 2020-12-25 PROCEDURE — 80048 BASIC METABOLIC PNL TOTAL CA: CPT

## 2020-12-25 PROCEDURE — 85025 COMPLETE CBC W/AUTO DIFF WBC: CPT

## 2020-12-25 PROCEDURE — 83735 ASSAY OF MAGNESIUM: CPT

## 2020-12-25 PROCEDURE — 99233 SBSQ HOSP IP/OBS HIGH 50: CPT | Performed by: FAMILY MEDICINE

## 2020-12-25 PROCEDURE — 36415 COLL VENOUS BLD VENIPUNCTURE: CPT

## 2020-12-25 PROCEDURE — 770006 HCHG ROOM/CARE - MED/SURG/GYN SEMI*

## 2020-12-25 PROCEDURE — 700105 HCHG RX REV CODE 258: Performed by: FAMILY MEDICINE

## 2020-12-25 RX ORDER — MAGNESIUM SULFATE HEPTAHYDRATE 40 MG/ML
2 INJECTION, SOLUTION INTRAVENOUS ONCE
Status: COMPLETED | OUTPATIENT
Start: 2020-12-25 | End: 2020-12-25

## 2020-12-25 RX ORDER — POTASSIUM CHLORIDE 20 MEQ/1
20 TABLET, EXTENDED RELEASE ORAL 2 TIMES DAILY
Status: DISCONTINUED | OUTPATIENT
Start: 2020-12-25 | End: 2020-12-26

## 2020-12-25 RX ADMIN — POTASSIUM CHLORIDE 20 MEQ: 1500 TABLET, EXTENDED RELEASE ORAL at 08:30

## 2020-12-25 RX ADMIN — DIBASIC SODIUM PHOSPHATE, MONOBASIC POTASSIUM PHOSPHATE AND MONOBASIC SODIUM PHOSPHATE 250 MG: 852; 155; 130 TABLET ORAL at 17:21

## 2020-12-25 RX ADMIN — GABAPENTIN 100 MG: 100 CAPSULE ORAL at 04:52

## 2020-12-25 RX ADMIN — PIPERACILLIN AND TAZOBACTAM 3.38 G: 3; .375 INJECTION, POWDER, LYOPHILIZED, FOR SOLUTION INTRAVENOUS; PARENTERAL at 12:41

## 2020-12-25 RX ADMIN — PIPERACILLIN AND TAZOBACTAM 3.38 G: 3; .375 INJECTION, POWDER, LYOPHILIZED, FOR SOLUTION INTRAVENOUS; PARENTERAL at 04:52

## 2020-12-25 RX ADMIN — DIBASIC SODIUM PHOSPHATE, MONOBASIC POTASSIUM PHOSPHATE AND MONOBASIC SODIUM PHOSPHATE 250 MG: 852; 155; 130 TABLET ORAL at 04:52

## 2020-12-25 RX ADMIN — GABAPENTIN 100 MG: 100 CAPSULE ORAL at 17:21

## 2020-12-25 RX ADMIN — MAGNESIUM SULFATE 2 G: 2 INJECTION INTRAVENOUS at 08:30

## 2020-12-25 RX ADMIN — GABAPENTIN 100 MG: 100 CAPSULE ORAL at 12:41

## 2020-12-25 RX ADMIN — PIPERACILLIN AND TAZOBACTAM 3.38 G: 3; .375 INJECTION, POWDER, LYOPHILIZED, FOR SOLUTION INTRAVENOUS; PARENTERAL at 20:44

## 2020-12-25 RX ADMIN — POTASSIUM CHLORIDE 20 MEQ: 1500 TABLET, EXTENDED RELEASE ORAL at 17:21

## 2020-12-25 ASSESSMENT — ENCOUNTER SYMPTOMS
BLURRED VISION: 0
FLANK PAIN: 0
HEARTBURN: 0
NECK PAIN: 0
NERVOUS/ANXIOUS: 0
WHEEZING: 0
DIARRHEA: 0
VOMITING: 0
FEVER: 0
SHORTNESS OF BREATH: 0
NAUSEA: 0
PALPITATIONS: 0
CHILLS: 0
CONSTIPATION: 0
COUGH: 0
DIZZINESS: 0
WEAKNESS: 1
MYALGIAS: 0
ABDOMINAL PAIN: 0
SENSORY CHANGE: 0
FOCAL WEAKNESS: 0
HEADACHES: 0
BACK PAIN: 0
SORE THROAT: 0
SPEECH CHANGE: 0

## 2020-12-25 ASSESSMENT — PAIN DESCRIPTION - PAIN TYPE: TYPE: SURGICAL PAIN

## 2020-12-25 NOTE — ANESTHESIA PREPROCEDURE EVALUATION
Relevant Problems   CARDIAC   (+) Hypertension         (+) CRISTHIAN (acute kidney injury) (HCC)       Physical Exam    Airway   Mallampati: II  TM distance: >3 FB  Neck ROM: full       Cardiovascular - normal exam  Rhythm: regular  Rate: normal  (-) murmur     Dental - normal exam           Pulmonary - normal exam  Breath sounds clear to auscultation     Abdominal    Neurological - normal exam                 Anesthesia Plan    ASA 2       Plan - MAC             Induction: intravenous    Postoperative Plan: Postoperative administration of opioids is intended.    Pertinent diagnostic labs and testing reviewed    Informed Consent:    Anesthetic plan and risks discussed with patient.    Use of blood products discussed with: patient whom consented to blood products.

## 2020-12-25 NOTE — OP REPORT
DATE OF SERVICE:  12/24/2020     REFERRING PHYSICIAN:  Casey Aquino MD     PROCEDURE:  Flexible sigmoidoscopy to the mid transverse colon.     INDICATIONS:  A 70-year-old man who underwent a low anterior resection in   October with recent constipation and a distended abdomen after takedown of a   diverting ileostomy.  Dr. Aquino requested a flexible sigmoidoscopy because of   concerns of an anastomotic stricture.  ASA is class III.  Sedation is   propofol sedation per Dr. Bliss.     FINDINGS:  1.  Wide open stapled anastomosis at 10 cm.  2.  Boggy edematous mucosa in the rectum and proximal sigmoid colon, probably   related to radiation therapy.  3.  This was an unprepped exam, but the mucosa was otherwise normal to the mid   transverse colon at 90 cm.     PROCEDURE:  After informed consent and a timeout, he was administered IV   propofol sedation.  Once he was comfortable, rectal exam was done and I did   not appreciate a stricture.  I could feel the anastomosis with the distal tip   of the examining digit.  The Olympus video flexible sigmoidoscope was passed   gently into the rectum.  There was some liquid stool, which was rinsed and   suctioned.  At 10 cm, there was a stapled anastomosis, which was widely patent   and at least 15-20 mm.  The scope was then advanced proximally into the   bowel, rinsing and suctioning liquid stool.  I was able to see 80% to 90% of   the mucosa. In the rectum in the area of the anastomosis and a bit proximally,   there was some mucosal edema or bogginess probably related to prior   radiation.  The descending colon, splenic flexure, and transverse colon to 90   cm were all normal.  Retroflexed view in the rectum was normal.  He tolerated   the procedure well and went to recovery room in stable condition.     IMPRESSION:    1.  Widely patent low anterior resection and anastomosis.  2.  Mucosal edema, probably related to radiation.     PLAN:    1.  Regular diet.  2.  GI is  signing off.  Call p.r.n.  Findings were discussed with Dr. Aquino.  3.  His subcutaneous heparin can be resumed.        ______________________________  MD AN QUEZADA/KM    DD:  12/24/2020 16:59  DT:  12/24/2020 17:28    Job#:  [id number]    CC:DIVYA AQUINO MD

## 2020-12-25 NOTE — PROGRESS NOTES
"Surgery    Events  12/20:  Less abdominal pain, less distention, less erythema  12/21:  Report continued BM.  Denies nausea or vomiting.  12/22:  Planned DRAINAGE OF ABSCESS CAVITY  12/23:  Wound vac placed and drain placed  12/24   Possible balloon dilatation  12/25: No evidence of obstruction on endoscopic evaluation.  Vitals  /74   Pulse 75   Temp 36.4 °C (97.6 °F) (Temporal)   Resp 16   Ht 1.753 m (5' 9\")   Wt 93.1 kg (205 lb 4 oz)   SpO2 94%   BMI 30.31 kg/m²     Exam  Alert, conversant  Resolved distention and erythema  Wound vac at ostomy closure site  Percutaneous drain with serous output    Labs  WBC normal  Hgb stable  Creatinine Stable  Percutaneous drain output with no growth on microbiologic and examination  A/P)  Diet as tolerated  Discontinue home on Augmentin twice daily and I will follow-up on cultures  Ambulate  Appreciate wound care reccs.  Will need  for wound vac care  Discharge planning per primary.  We will follow-up in 2 weeks.  Casey Aquino MD PhD  [facility]  Colon and Rectal Surgeon  [phone]      "

## 2020-12-25 NOTE — OR SURGEON
Immediate Post OP Note    PreOp Diagnosis: Constipation and abdominal distention worrisome for low anterior resection anastomotic stricture.    PostOp Diagnosis:   1. Wide open stapled anastomosis at 10cm.  2. Boggy edematous mucosa and rectum probably related to XRT.  3. Unprepped examine otherwise normal to midtransverse colon at 90cm.     Procedure(s):  FLEXIBLE SIGMOIDOSCOPY - Wound Class: Contaminated    Surgeon(s):  Quinn Sawyer M.D.    Anesthesiologist/Type of Anesthesia:  Anesthesiologist: Flaco Bliss M.D./Sedation    Surgical Staff:  Circulator: Gabi Shi R.N.  Endoscopy Technician: Parisa Arevalo    Specimens removed if any:  * No specimens in log *    Estimated Blood Loss: None.     Findings: See above.     Complications: None immediate.  Plan:  1. Reg diet.  2. GI signing off. Call PRN. Findings discussed with Dr. Aquino.  3. OK to resume SQ heparin.         12/24/2020 4:53 PM Quinn Sawyer M.D.

## 2020-12-25 NOTE — PROGRESS NOTES
[facility] Medicine Daily Progress Note    Date of Service  12/25/2020    Chief Complaint  70 y.o. male admitted 12/18/2020 with abdominal wall cellulitis.    Hospital Course  Admitted with abdominal wall cellulitis and pelvic fluid collection which was noted on CT scan of abdomen and pelvis.  He had recent ileostomy reversal, he has history of robotic-assisted laparoscopic low anterior resection with creation of a diverting loop ileostomy.  He was placed on IV vancomycin and Zosyn. Surgery was consulted on the case.  He developed acute kidney injury, and IV vancomycin was stopped.  The wound VAC was placed for the abdominal wall wound.  CT-guided drain was placed for the pelvic fluid collection on 12/23/2020 . Flexible sigmoidoscopy was done which showed Wide open stapled anastomosis at 10cm, Boggy edematous mucosa and rectum probably related to XRT, Unprepped examine otherwise normal to midtransverse colon at 90cm on 12/24/2020    Interval Problem Update  Abdominal wall cellulitis - wound vac placed  Pelvic fluid collection - CT-guided drain placed, fluid culture negative so far  CRISTHIAN - crea 1.0  HTN - sbp 130-150  Low potassium and magnesium    Consultants/Specialty  Surgery     Code Status  Full Code    Disposition  Home health    Review of Systems  Review of Systems   Constitutional: Negative for chills, fever and malaise/fatigue.   HENT: Negative for congestion, hearing loss and sore throat.    Eyes: Negative for blurred vision.   Respiratory: Negative for cough, shortness of breath and wheezing.    Cardiovascular: Negative for chest pain and palpitations.   Gastrointestinal: Negative for abdominal pain, constipation, diarrhea, heartburn, nausea and vomiting.   Genitourinary: Negative for dysuria, flank pain and hematuria.   Musculoskeletal: Negative for back pain, joint pain, myalgias and neck pain.   Skin: Negative for rash.   Neurological: Positive for weakness. Negative for dizziness, sensory change, speech  change, focal weakness and headaches.   Psychiatric/Behavioral: The patient is not nervous/anxious.         Physical Exam  Temp:  [36.4 °C (97.6 °F)-36.7 °C (98 °F)] 36.4 °C (97.6 °F)  Pulse:  [65-75] 75  Resp:  [12-16] 16  BP: (129-154)/(70-91) 136/74  SpO2:  [93 %-95 %] 94 %    Physical Exam  Vitals signs and nursing note reviewed.   Constitutional:       Appearance: He is obese.   HENT:      Head: Normocephalic and atraumatic.      Nose: No congestion.      Mouth/Throat:      Mouth: Mucous membranes are dry.   Eyes:      Extraocular Movements: Extraocular movements intact.      Conjunctiva/sclera: Conjunctivae normal.      Pupils: Pupils are equal, round, and reactive to light.   Neck:      Musculoskeletal: No muscular tenderness.   Cardiovascular:      Rate and Rhythm: Normal rate and regular rhythm.   Pulmonary:      Effort: Pulmonary effort is normal.      Breath sounds: Normal breath sounds.   Abdominal:      General: There is distension.      Tenderness: There is abdominal tenderness. There is no guarding or rebound.      Comments: Wound vac   Musculoskeletal:      Right lower leg: No edema.      Left lower leg: No edema.   Skin:     Findings: Erythema (abdominal wall) present. Wound: RLQ abdomen.   Neurological:      General: No focal deficit present.      Mental Status: He is alert and oriented to person, place, and time. Mental status is at baseline.         Fluids    Intake/Output Summary (Last 24 hours) at 12/25/2020 1227  Last data filed at 12/25/2020 0816  Gross per 24 hour   Intake 720 ml   Output 315 ml   Net 405 ml       Laboratory  Recent Labs     12/23/20  0509 12/24/20  0131 12/25/20  0154   WBC 4.7* 4.4* 6.4   RBC 2.84* 2.75* 3.40*   HEMOGLOBIN 9.6* 9.3* 11.4*   HEMATOCRIT 30.0* 28.6* 33.9*   .6* 104.0* 101.5*   MCH 33.8* 33.8* 32.9   MCHC 32.0* 32.5* 32.5*   RDW 51.6* 50.8* 49.7   PLATELETCT 174 171 228   MPV 10.0 9.4 9.6     Recent Labs     12/23/20  0509 12/24/20  0131 12/24/20  1427  12/25/20  0154   SODIUM 136 139  --  141   POTASSIUM 3.0* 2.7* 2.8* 3.0*   CHLORIDE 111 110  --  110   CO2 19* 22  --  21   GLUCOSE 93 98  --  97   BUN 13 11  --  8   CREATININE 1.26 1.10  --  1.06   CALCIUM 8.5 8.7  --  8.9     Recent Labs     12/23/20  0800   INR 1.07               Imaging  CT-DRAIN-RETROPERITONEAL   Final Result      Successful presacral pelvic drainage tube placement.      Plan: Twice daily flushes with 10 mL of sterile saline. Monitor outputs. Please contact interventional radiology if there is any concern for tube dysfunction.         DX-CHEST-PORTABLE (1 VIEW)   Final Result      Hypoinflation with bibasilar opacities, atelectasis and/or pneumonitis.      CT-ABDOMEN-PELVIS WITH   Final Result         1.  Presacral mildly enhancing fluid collection, appearance suggests abscess.   2.  Large quantity of stool in the cecum favors changes of constipation. Component of evolving [place]'s or ileus not excluded. Recommend radiographic follow-up to resolution   3.  Bladder wall thickening, consider changes of cystitis, correlate with urinalysis   4.  Bilateral fat-containing inguinal hernias   5.  Small layering left pleural effusion   6.  Fat-containing umbilical hernia   7.  Atherosclerosis and atherosclerotic coronary artery disease.           Assessment/Plan  * Abdominal wall cellulitis- (present on admission)  Assessment & Plan  IV Zosyn  Wound care    Pelvic fluid collection- (present on admission)  Assessment & Plan  IV Zosyn  CT guided drain placement 12/23/2020  Follow culture    History of reversal of ileostomy- (present on admission)  Assessment & Plan  Flexible sigmoidoscopy - Wide open stapled anastomosis at 10cm. Boggy edematous mucosa and rectum probably related to XRT. Unprepped examine otherwise normal to midtransverse colon at 90cm. 12/24/2020  Full liquids  Advance to Regular diet  Bowel protocol    CRISTHIAN (acute kidney injury) (HCC)- (present on admission)  Assessment & Plan  Stop IVF  NS, follow bmp    Sepsis (HCC)- (present on admission)  Assessment & Plan  This is Sepsis Present on admission  SIRS criteria identified on admission include: Fever, with temperature greater than 101 deg F and Tachypnea, with respirations greater than 20 per minute  Source is Cellulitis, Abscess  Sepsis protocol initiated  Fluid resuscitation ordered per protocol  IV antibiotics as appropriate for source of sepsis  While organ dysfunction may be noted elsewhere in this problem list or in the chart, degree of organ dysfunction does not meet CMS criteria for severe sepsis          Hypophosphatemia- (present on admission)  Assessment & Plan  Kphos, follow level    Thrombocytopenia (HCC)- (present on admission)  Assessment & Plan  Follow cbc    Hypokalemia- (present on admission)  Assessment & Plan  Kdur, follow bmp    Macrocytic anemia- (present on admission)  Assessment & Plan  Follow cbc    Hypertension- (present on admission)  Assessment & Plan  Hold Losartan   IV Labetalol as needed    Hypomagnesemia- (present on admission)  Assessment & Plan  IV Mg 2 g  Follow level       VTE prophylaxis: Heparin

## 2020-12-25 NOTE — ANESTHESIA TIME REPORT
Anesthesia Start and Stop Event Times     Date Time Event    12/24/2020 1612 Ready for Procedure     1618 Anesthesia Start     1646 Anesthesia Stop        Responsible Staff  12/24/20    Name Role Begin End    Flaco Bliss M.D. Anesth 1618 1646        Preop Diagnosis (Free Text):  Pre-op Diagnosis     Low Anterior Resection Anastamotic Stricture        Preop Diagnosis (Codes):    Post op Diagnosis  Bowel obstruction (HCC)      Premium Reason  A. 3PM - 7AM    Comments:

## 2020-12-25 NOTE — ANESTHESIA POSTPROCEDURE EVALUATION
Patient: Casey Arroyo    Procedure Summary     Date: 12/24/20 Room / Location: [facility] 04 / SURGERY [facility]    Anesthesia Start: 1618 Anesthesia Stop: 1646    Procedure: FLEXIBLE SIGMOIDOSCOPY (N/A Anus) Diagnosis: (Low Anterior Resection Anastamotic Stricture)    Surgeons: Quinn Sawyer M.D. Responsible Provider: Flaco Bliss M.D.    Anesthesia Type: MAC ASA Status: 2          Final Anesthesia Type: MAC  Last vitals  BP   Blood Pressure : 134/74    Temp   36.4 °C (97.6 °F)    Pulse   Pulse: 72   Resp   14    SpO2   94 %      Anesthesia Post Evaluation    Patient location during evaluation: PACU  Patient participation: complete - patient participated  Level of consciousness: awake and alert  Pain score: 0    Airway patency: patent  Anesthetic complications: no  Cardiovascular status: hemodynamically stable  Respiratory status: acceptable  Hydration status: euvolemic    PONV: none           Nurse Pain Score: 0 (NPRS)

## 2020-12-25 NOTE — OR NURSING
Pt recovered well. Had no pain or nausea, no incision, no belongings. VSS, on RA. Wife was called and updated on pt status and plan to return to room shortly. Pt has been resting comfortably on left side, no needs at this time, able to make known.

## 2020-12-25 NOTE — PROGRESS NOTES
"Surgery    Events  12/20: Less abdominal pain, less distention, less erythema  12/21:  Report continued BM.  Denies nausea or vomiting.  12/22:  Planned DRAINAGE OF ABSCESS CAVITY  12/23:  Wound vac placed and drain placed  12/24 Possible balloon dilatation  Vitals  /76   Pulse 74   Temp 36.6 °C (97.8 °F) (Temporal)   Resp 16   Ht 1.753 m (5' 9\")   Wt 93.1 kg (205 lb 4 oz)   SpO2 95%   BMI 30.31 kg/m²     Exam  Alert, conversant  Less distention, less erythema  Wound vac at ostomy closure site    Labs  WBC normal  Hgb stable  Creatinine Stable    A/P)  Diet as tolerated  Continue ABx with drain cultures pending  FU Cx  Ambulate  Appreciate wound care reccs.  Will need  for wound vac care  Appreciate GI assistance  Casey Aquino MD PhD  [facility]  Colon and Rectal Surgeon  [phone]      "

## 2020-12-25 NOTE — ANESTHESIA QCDR
2019 [facility] Clinical Data Registry (for Quality Improvement)     Postoperative nausea/vomiting risk protocol (Adult = 18 yrs and Pediatric 3-17 yrs)- (430 and 463)  General inhalation anesthetic (NOT TIVA) with PONV risk factors: Yes  Provision of anti-emetic therapy with at least 2 different classes of agents: Yes   Patient DID NOT receive anti-emetic therapy and reason is documented in Medical Record:  N/A    Multimodal Pain Management- (477)  Non-emergent surgery AND patient age >= 18: Yes  Use of Multimodal Pain Management, two or more drugs and/or interventions, NOT including systemic opioids: No  Exception: Documented allergy to multiple classes of analgesics: No       Smoking Abstinence (404)  Patient is current smoker (cigarette, pipe, e-cig, marijuanna): No  Elective Surgery:   Abstinence instructions provided prior to day of surgery:   Patient abstained from smoking on day of surgery:     Pre-Op Beta-Blocker in Isolated CABG (44)  Isolated CABG AND patient age >= 18: No  Beta-blocker admin within 24 hours of surgical incision:   Exception:of medical reason(s) for not administering beta blocker within 24 hours prior to surgical incision (e.g., not  indicated,other medical reason):     PACU assessment of acute postoperative pain prior to Anesthesia Care End- Applies to Patients Age = 18- (ABG7)  Initial PACU pain score is which of the following: < 7/10  Patient unable to report pain score: N/A    Post-anesthetic transfer of care checklist/protocol to PACU/ICU- (426 and 427)  Upon conclusion of case, patient transferred to which of the following locations: PACU/Non-ICU  Use of transfer checklist/protocol: Yes  Exclusion: Service Performed in Patient Hospital Room (and thus did not require transfer): N/A  Unplanned admission to ICU related to anesthesia service up through end of PACU care- (MD51)  Unplanned admission to ICU (not initially anticipated at anesthesia start time): No

## 2020-12-25 NOTE — PROGRESS NOTES
Pt is A&O x4, calls appropriately  Denies pain   Denies nausea  Tolerating a regular liquid diet   Incision to LUQ with CDI wound vac  IR Drain to L buttock, CDI  + Voids  + flatus  Last BM 12/24  Up self  Bed alarm off, pt no fall risk per danis justice  Reviewed plan of care with patient, bed in lowest position and locked, pt resting comfortably now, call light within reach, all needs met at this time. Interventions will be executed per plan of care

## 2020-12-26 LAB
ANION GAP SERPL CALC-SCNC: 9 MMOL/L (ref 7–16)
BACTERIA FLD AEROBE CULT: NORMAL
BASOPHILS # BLD AUTO: 0.3 % (ref 0–1.8)
BASOPHILS # BLD: 0.02 K/UL (ref 0–0.12)
BUN SERPL-MCNC: 6 MG/DL (ref 8–22)
CALCIUM SERPL-MCNC: 8.6 MG/DL (ref 8.5–10.5)
CHLORIDE SERPL-SCNC: 110 MMOL/L (ref 96–112)
CO2 SERPL-SCNC: 22 MMOL/L (ref 20–33)
CREAT SERPL-MCNC: 0.99 MG/DL (ref 0.5–1.4)
EOSINOPHIL # BLD AUTO: 0.2 K/UL (ref 0–0.51)
EOSINOPHIL NFR BLD: 3.5 % (ref 0–6.9)
ERYTHROCYTE [DISTWIDTH] IN BLOOD BY AUTOMATED COUNT: 50 FL (ref 35.9–50)
GLUCOSE SERPL-MCNC: 107 MG/DL (ref 65–99)
GRAM STN SPEC: NORMAL
HCT VFR BLD AUTO: 28.3 % (ref 42–52)
HGB BLD-MCNC: 9.4 G/DL (ref 14–18)
IMM GRANULOCYTES # BLD AUTO: 0.11 K/UL (ref 0–0.11)
IMM GRANULOCYTES NFR BLD AUTO: 1.9 % (ref 0–0.9)
LYMPHOCYTES # BLD AUTO: 0.61 K/UL (ref 1–4.8)
LYMPHOCYTES NFR BLD: 10.6 % (ref 22–41)
MAGNESIUM SERPL-MCNC: 2.1 MG/DL (ref 1.5–2.5)
MCH RBC QN AUTO: 33.6 PG (ref 27–33)
MCHC RBC AUTO-ENTMCNC: 33.2 G/DL (ref 33.7–35.3)
MCV RBC AUTO: 101.1 FL (ref 81.4–97.8)
MONOCYTES # BLD AUTO: 0.75 K/UL (ref 0–0.85)
MONOCYTES NFR BLD AUTO: 13 % (ref 0–13.4)
NEUTROPHILS # BLD AUTO: 4.06 K/UL (ref 1.82–7.42)
NEUTROPHILS NFR BLD: 70.7 % (ref 44–72)
NRBC # BLD AUTO: 0 K/UL
NRBC BLD-RTO: 0 /100 WBC
PHOSPHATE SERPL-MCNC: 3.1 MG/DL (ref 2.5–4.5)
PLATELET # BLD AUTO: 220 K/UL (ref 164–446)
PMV BLD AUTO: 9.5 FL (ref 9–12.9)
POTASSIUM SERPL-SCNC: 2.6 MMOL/L (ref 3.6–5.5)
POTASSIUM SERPL-SCNC: 2.8 MMOL/L (ref 3.6–5.5)
POTASSIUM SERPL-SCNC: 2.9 MMOL/L (ref 3.6–5.5)
RBC # BLD AUTO: 2.8 M/UL (ref 4.7–6.1)
SIGNIFICANT IND 70042: NORMAL
SITE SITE: NORMAL
SODIUM SERPL-SCNC: 141 MMOL/L (ref 135–145)
SOURCE SOURCE: NORMAL
WBC # BLD AUTO: 5.8 K/UL (ref 4.8–10.8)

## 2020-12-26 PROCEDURE — 99233 SBSQ HOSP IP/OBS HIGH 50: CPT | Performed by: FAMILY MEDICINE

## 2020-12-26 PROCEDURE — 84132 ASSAY OF SERUM POTASSIUM: CPT

## 2020-12-26 PROCEDURE — 770006 HCHG ROOM/CARE - MED/SURG/GYN SEMI*

## 2020-12-26 PROCEDURE — 85025 COMPLETE CBC W/AUTO DIFF WBC: CPT

## 2020-12-26 PROCEDURE — 84100 ASSAY OF PHOSPHORUS: CPT

## 2020-12-26 PROCEDURE — 700105 HCHG RX REV CODE 258: Performed by: FAMILY MEDICINE

## 2020-12-26 PROCEDURE — 83735 ASSAY OF MAGNESIUM: CPT

## 2020-12-26 PROCEDURE — 97605 NEG PRS WND THER DME<=50SQCM: CPT

## 2020-12-26 PROCEDURE — 700102 HCHG RX REV CODE 250 W/ 637 OVERRIDE(OP): Performed by: FAMILY MEDICINE

## 2020-12-26 PROCEDURE — 80048 BASIC METABOLIC PNL TOTAL CA: CPT

## 2020-12-26 PROCEDURE — 700111 HCHG RX REV CODE 636 W/ 250 OVERRIDE (IP): Performed by: FAMILY MEDICINE

## 2020-12-26 PROCEDURE — A9270 NON-COVERED ITEM OR SERVICE: HCPCS | Performed by: FAMILY MEDICINE

## 2020-12-26 PROCEDURE — 36415 COLL VENOUS BLD VENIPUNCTURE: CPT

## 2020-12-26 PROCEDURE — A9270 NON-COVERED ITEM OR SERVICE: HCPCS | Performed by: NURSE PRACTITIONER

## 2020-12-26 PROCEDURE — 700102 HCHG RX REV CODE 250 W/ 637 OVERRIDE(OP): Performed by: NURSE PRACTITIONER

## 2020-12-26 RX ORDER — AMOXICILLIN AND CLAVULANATE POTASSIUM 875; 125 MG/1; MG/1
1 TABLET, FILM COATED ORAL EVERY 12 HOURS
Status: DISCONTINUED | OUTPATIENT
Start: 2020-12-26 | End: 2020-12-29 | Stop reason: HOSPADM

## 2020-12-26 RX ORDER — POTASSIUM CHLORIDE 7.45 MG/ML
10 INJECTION INTRAVENOUS
Status: COMPLETED | OUTPATIENT
Start: 2020-12-26 | End: 2020-12-26

## 2020-12-26 RX ORDER — AMLODIPINE BESYLATE 10 MG/1
5 TABLET ORAL
Status: DISCONTINUED | OUTPATIENT
Start: 2020-12-26 | End: 2020-12-28

## 2020-12-26 RX ORDER — POTASSIUM CHLORIDE 20 MEQ/1
40 TABLET, EXTENDED RELEASE ORAL 2 TIMES DAILY
Status: DISCONTINUED | OUTPATIENT
Start: 2020-12-26 | End: 2020-12-27

## 2020-12-26 RX ADMIN — GABAPENTIN 100 MG: 100 CAPSULE ORAL at 06:02

## 2020-12-26 RX ADMIN — POTASSIUM CHLORIDE 10 MEQ: 7.46 INJECTION, SOLUTION INTRAVENOUS at 13:37

## 2020-12-26 RX ADMIN — POTASSIUM CHLORIDE 40 MEQ: 1500 TABLET, EXTENDED RELEASE ORAL at 17:35

## 2020-12-26 RX ADMIN — POTASSIUM CHLORIDE 10 MEQ: 7.46 INJECTION, SOLUTION INTRAVENOUS at 12:36

## 2020-12-26 RX ADMIN — POTASSIUM CHLORIDE 10 MEQ: 7.46 INJECTION, SOLUTION INTRAVENOUS at 11:35

## 2020-12-26 RX ADMIN — AMLODIPINE BESYLATE 5 MG: 10 TABLET ORAL at 10:28

## 2020-12-26 RX ADMIN — POTASSIUM CHLORIDE 10 MEQ: 7.46 INJECTION, SOLUTION INTRAVENOUS at 10:29

## 2020-12-26 RX ADMIN — GABAPENTIN 100 MG: 100 CAPSULE ORAL at 11:35

## 2020-12-26 RX ADMIN — AMOXICILLIN AND CLAVULANATE POTASSIUM 1 TABLET: 875; 125 TABLET, FILM COATED ORAL at 10:28

## 2020-12-26 RX ADMIN — GABAPENTIN 100 MG: 100 CAPSULE ORAL at 17:35

## 2020-12-26 RX ADMIN — PIPERACILLIN AND TAZOBACTAM 3.38 G: 3; .375 INJECTION, POWDER, LYOPHILIZED, FOR SOLUTION INTRAVENOUS; PARENTERAL at 06:01

## 2020-12-26 RX ADMIN — POTASSIUM CHLORIDE 40 MEQ: 1500 TABLET, EXTENDED RELEASE ORAL at 06:02

## 2020-12-26 RX ADMIN — DIBASIC SODIUM PHOSPHATE, MONOBASIC POTASSIUM PHOSPHATE AND MONOBASIC SODIUM PHOSPHATE 250 MG: 852; 155; 130 TABLET ORAL at 06:38

## 2020-12-26 RX ADMIN — AMOXICILLIN AND CLAVULANATE POTASSIUM 1 TABLET: 875; 125 TABLET, FILM COATED ORAL at 17:35

## 2020-12-26 ASSESSMENT — ENCOUNTER SYMPTOMS
FEVER: 0
DIZZINESS: 0
NAUSEA: 0
BACK PAIN: 0
SORE THROAT: 0
FOCAL WEAKNESS: 0
HEADACHES: 0
WHEEZING: 0
PALPITATIONS: 0
CONSTIPATION: 0
NERVOUS/ANXIOUS: 0
MYALGIAS: 0
BLURRED VISION: 0
CHILLS: 0
ABDOMINAL PAIN: 0
SHORTNESS OF BREATH: 0
WEAKNESS: 0
VOMITING: 0
HEARTBURN: 0
SENSORY CHANGE: 0
COUGH: 0
NECK PAIN: 0
FLANK PAIN: 0
DIARRHEA: 0
SPEECH CHANGE: 0

## 2020-12-26 ASSESSMENT — PAIN DESCRIPTION - PAIN TYPE: TYPE: SURGICAL PAIN

## 2020-12-26 NOTE — PROGRESS NOTES
Pt is A&O x4, calls appropriately  Denies pain  Denies nausea  Tolerating a regular liquid diet   Incision to LUQ with CDI wound vac  IR Drain to L buttock, CDI  + Voids  + flatus  Last BM 12/25  Up self  Bed alarm off, pt no fall risk per danis justice  Reviewed plan of care with patient, bed in lowest position and locked, pt resting comfortably now, call light within reach, all needs met at this time. Interventions will be executed per plan of care

## 2020-12-26 NOTE — PROGRESS NOTES
[facility] Medicine Daily Progress Note    Date of Service  12/26/2020    Chief Complaint  70 y.o. male admitted 12/18/2020 with abdominal wall cellulitis.    Hospital Course  Admitted with abdominal wall cellulitis and pelvic fluid collection which was noted on CT scan of abdomen and pelvis.  He had recent ileostomy reversal, he has history of robotic-assisted laparoscopic low anterior resection with creation of a diverting loop ileostomy.  He was placed on IV vancomycin and Zosyn. Surgery was consulted on the case.  He developed acute kidney injury, and IV vancomycin was stopped.  The wound VAC was placed for the abdominal wall wound.  CT-guided drain was placed for the pelvic fluid collection on 12/23/2020 . Flexible sigmoidoscopy was done which showed Wide open stapled anastomosis at 10cm, Boggy edematous mucosa and rectum probably related to XRT, Unprepped examine otherwise normal to midtransverse colon at 90cm on 12/24/2020.  Fluid Gram stain and culture from the drain has been noted to be negative.    Interval Problem Update  Abdominal wall cellulitis - wound vac placed  Pelvic fluid collection - CT-guided drain placed, fluid culture negative  CRISTHIAN - crea resolved  HTN - sbp 140-150  Low potassium     Consultants/Specialty  Surgery     Code Status  Full Code    Disposition  Home health for wound care    Review of Systems  Review of Systems   Constitutional: Negative for chills, fever and malaise/fatigue.   HENT: Negative for congestion, hearing loss and sore throat.    Eyes: Negative for blurred vision.   Respiratory: Negative for cough, shortness of breath and wheezing.    Cardiovascular: Negative for chest pain and palpitations.   Gastrointestinal: Negative for abdominal pain, constipation, diarrhea, heartburn, nausea and vomiting.   Genitourinary: Negative for dysuria, flank pain and hematuria.   Musculoskeletal: Negative for back pain, joint pain, myalgias and neck pain.   Skin: Negative for rash.    Neurological: Negative for dizziness, sensory change, speech change, focal weakness, weakness and headaches.   Psychiatric/Behavioral: The patient is not nervous/anxious.         Physical Exam  Temp:  [36.2 °C (97.1 °F)-37.2 °C (98.9 °F)] 36.4 °C (97.5 °F)  Pulse:  [67-70] 70  Resp:  [17-18] 17  BP: (141-149)/(69-91) 144/91  SpO2:  [93 %-96 %] 94 %    Physical Exam  Vitals signs and nursing note reviewed.   Constitutional:       Appearance: He is obese.   HENT:      Head: Normocephalic and atraumatic.      Nose: No congestion.      Mouth/Throat:      Mouth: Mucous membranes are moist.   Eyes:      Extraocular Movements: Extraocular movements intact.      Conjunctiva/sclera: Conjunctivae normal.      Pupils: Pupils are equal, round, and reactive to light.   Neck:      Musculoskeletal: No muscular tenderness.   Cardiovascular:      Rate and Rhythm: Normal rate and regular rhythm.   Pulmonary:      Effort: Pulmonary effort is normal.      Breath sounds: Normal breath sounds.   Abdominal:      General: There is no distension.      Tenderness: There is no abdominal tenderness. There is no guarding or rebound.      Comments: Wound vac   Musculoskeletal:      Right lower leg: No edema.      Left lower leg: No edema.   Skin:     Findings: Erythema (abdominal wall) present. Wound: RLQ abdomen.   Neurological:      General: No focal deficit present.      Mental Status: He is alert and oriented to person, place, and time. Mental status is at baseline.         Fluids    Intake/Output Summary (Last 24 hours) at 12/26/2020 1022  Last data filed at 12/26/2020 0830  Gross per 24 hour   Intake 1310 ml   Output 175 ml   Net 1135 ml       Laboratory  Recent Labs     12/24/20  0131 12/25/20  0154 12/26/20  0401   WBC 4.4* 6.4 5.8   RBC 2.75* 3.40* 2.80*   HEMOGLOBIN 9.3* 11.4* 9.4*   HEMATOCRIT 28.6* 33.9* 28.3*   .0* 101.5* 101.1*   MCH 33.8* 32.9 33.6*   MCHC 32.5* 32.5* 33.2*   RDW 50.8* 49.7 50.0   PLATELETCT 171 228 220    MPV 9.4 9.6 9.5     Recent Labs     12/24/20  0131 12/24/20  0131 12/25/20  0154 12/26/20  0401 12/26/20  0808   SODIUM 139  --  141 141  --    POTASSIUM 2.7*   < > 3.0* 2.6* 2.8*   CHLORIDE 110  --  110 110  --    CO2 22  --  21 22  --    GLUCOSE 98  --  97 107*  --    BUN 11  --  8 6*  --    CREATININE 1.10  --  1.06 0.99  --    CALCIUM 8.7  --  8.9 8.6  --     < > = values in this interval not displayed.                   Imaging  CT-DRAIN-RETROPERITONEAL   Final Result      Successful presacral pelvic drainage tube placement.      Plan: Twice daily flushes with 10 mL of sterile saline. Monitor outputs. Please contact interventional radiology if there is any concern for tube dysfunction.         DX-CHEST-PORTABLE (1 VIEW)   Final Result      Hypoinflation with bibasilar opacities, atelectasis and/or pneumonitis.      CT-ABDOMEN-PELVIS WITH   Final Result         1.  Presacral mildly enhancing fluid collection, appearance suggests abscess.   2.  Large quantity of stool in the cecum favors changes of constipation. Component of evolving [place]'s or ileus not excluded. Recommend radiographic follow-up to resolution   3.  Bladder wall thickening, consider changes of cystitis, correlate with urinalysis   4.  Bilateral fat-containing inguinal hernias   5.  Small layering left pleural effusion   6.  Fat-containing umbilical hernia   7.  Atherosclerosis and atherosclerotic coronary artery disease.           Assessment/Plan  * Abdominal wall cellulitis- (present on admission)  Assessment & Plan  Stop IV Zosyn  Start Augmentin  Wound care    Pelvic fluid collection- (present on admission)  Assessment & Plan  CT guided drain placement 12/23/2020  Follow up with Surgery as outpatient    History of reversal of ileostomy- (present on admission)  Assessment & Plan  Flexible sigmoidoscopy - Wide open stapled anastomosis at 10cm. Boggy edematous mucosa and rectum probably related to XRT. Unprepped examine otherwise normal to  midtransverse colon at 90cm. 12/24/2020  Bowel protocol    CRISTHIAN (acute kidney injury) (HCC)- (present on admission)  Assessment & Plan  follow bmp    Sepsis (HCC)- (present on admission)  Assessment & Plan  This is Sepsis Present on admission  SIRS criteria identified on admission include: Fever, with temperature greater than 101 deg F and Tachypnea, with respirations greater than 20 per minute  Source is Cellulitis, Abscess  Sepsis protocol initiated  Fluid resuscitation ordered per protocol  IV antibiotics as appropriate for source of sepsis  While organ dysfunction may be noted elsewhere in this problem list or in the chart, degree of organ dysfunction does not meet CMS criteria for severe sepsis          Hypophosphatemia- (present on admission)  Assessment & Plan  Stop Kphos, follow level    Thrombocytopenia (HCC)- (present on admission)  Assessment & Plan  Follow cbc    Hypokalemia- (present on admission)  Assessment & Plan  IV KCl, Kdur  Repeat serum potassium today    Macrocytic anemia- (present on admission)  Assessment & Plan  Follow cbc    Hypertension- (present on admission)  Assessment & Plan  Start Norvasc 5 mg   Hold Losartan   IV Labetalol as needed    Hypomagnesemia- (present on admission)  Assessment & Plan  IV Mg given  Follow level       VTE prophylaxis: Heparin

## 2020-12-26 NOTE — PROGRESS NOTES
Bedside report received. Assessment completed.  Pt is A&O x4. Pt on room air.   Medicating for pain PRN per MAR Pain 0/10  Denies nausea.  Bilat fingers/feet numbness/tingling (baseline neuropathy).  RUQ vac, L buttock IR drain    Last BM 12/25/20 . +flatus,   +void.  Regular diet. Tolerates well.   Pt up self.  Call light and belongings within reach. All needs met at this time. Fall Precautions and hourly rounding in place.

## 2020-12-26 NOTE — PROGRESS NOTES
Danyelle from Lab called with critical result of Potassium at 0510. Critical lab result read back to Danyelle.   Dr. Morris notified of critical lab result at 0146.  Critical lab result read back by Dr. Morris.

## 2020-12-26 NOTE — WOUND TEAM
[facility]  Inpatient Services  Wound and Skin Care Follow Up    Admission Date: 12/18/2020     Last order of IP CONSULT TO WOUND CARE was found on 12/21/2020 from Hospital Encounter on 12/18/2020       HPI, PMH, SH: Reviewed    Unit where seen by Wound Team: [id number]     WOUND CONSULT/FOLLOW UP RELATED TO:  Right LQ abdomen, scheduled wound vac dressing change    Self Report / Pain Level:  Pt declined need for pain med.  Tolerated dressing change well.    OBJECTIVE:  Pt lying in bed with intact dressing in place. Preventable measures in place, pressure redistribution mattress.    WOUND TYPE, LOCATION, CHARACTERISTICS (Pressure Injuries: location, stage, POA or date identified)    Negative Pressure Wound Therapy 12/23/20 Surgical Abdomen Lower Right (Active)   NPWT Pump Mode / Pressure Setting Continuous;125 mmHg;[place] 12/26/20 1100   Dressing Type Medium;Black Foam (Regular) 12/26/20 1100   Number of Foam Pieces Used 2 12/26/20 1100   Canister Changed No 12/26/20 1100   Output (mL) 110 mL 12/26/20 0820        WOUND NURSE ONLY - Time Spent with Patient (mins) 60 12/26/20 1100     Wound 12/15/20 Incision Abdomen Right 4x4 and tegaderm (Active)   Wound Image   12/22/20 1000   Site Assessment Red 12/26/20 1100   Periwound Assessment Intact 12/26/20 1100   Margins Attached edges 12/26/20 1100   Closure Secondary intention 12/26/20 1100   Drainage Amount Moderate 12/26/20 1100   Drainage Description Serosanguineous 12/26/20 1100   Treatments Cleansed 12/26/20 1100   Wound Cleansing Approved Wound Cleanser 12/26/20 1100   Periwound Protectant Benzoin;Drape 12/26/20 1100   Dressing Cleansing/Solutions Not Applicable 12/23/20 1500   Dressing Options Wound Vac 12/26/20 1100   Dressing Changed Changed 12/26/20 1100   Dressing Status Clean;Dry;Intact 12/26/20 1100   Dressing Change/Treatment Frequency Tuesday, Thursday, Saturday, and As Needed 12/26/20 1100   NEXT Dressing Change/Treatment Date 12/19/20 12/23/20  2100   NEXT Weekly Photo (Inpatient Only) 12/30/20 12/23/20 1500   Non-staged Wound Description Full thickness 12/26/20 1100   Wound Length (cm) 5.5 cm 12/22/20 1000   Wound Width (cm) 6.1 cm 12/22/20 1000   Wound Depth (cm) 2 cm 12/22/20 1000   Wound Surface Area (cm^2) 33.55 cm^2 12/22/20 1000   Wound Volume (cm^3) 67.1 cm^3 12/22/20 1000   Tunneling (cm) 13 cm 12/22/20 1000   Tunneling Clock Position of Wound 2 12/22/20 1000   Shape Circular 12/23/20 1500   Wound Odor None 12/26/20 1100   Exposed Structures None 12/26/20 1100   WOUND NURSE ONLY - Time Spent with Patient (mins) 60 12/26/20 1100     Vascular:    LEE ANN:   No results found.      Lab Values:    Lab Results   Component Value Date/Time    WBC 5.8 12/26/2020 04:01 AM    RBC 2.80 (L) 12/26/2020 04:01 AM    HEMOGLOBIN 9.4 (L) 12/26/2020 04:01 AM    HEMATOCRIT 28.3 (L) 12/26/2020 04:01 AM    CREACTPROT 15.80 (H) 12/20/2020 02:02 PM          Culture: none  Culture Results show:  No results found for this or any previous visit (from the past 720 hour(s)).      INTERVENTIONS BY WOUND TEAM:  Chart reviewed.  RLQ abdomen wound vac dressing removed.  Wound bed cleansed with wound cleanser and patted dry with gauze.  Effie-wound protected with benzoin and drape.  1 black foam to the depth of the tract ~6-8cm deep, and 1 black foam beveled for the wound bed and TRAC pad.  NPWT resumed at 125mmHg, no leaks noted.     Interdisciplinary consultation: Patient, Bedside RN    EVALUATION: Patient had a ileostomy reversal done by Dr. Aquino on 12/15, discharged next day.  Patient then developed increased abdominal distention and erythema to the abdomen.  Patient came in for evaluation, found to have a fluid collection, concern for abscess at the presacral area.      Goals: Steady decrease in wound area and depth weekly.    NURSING PLAN OF CARE ORDERS (X):    Dressing changes: See Dressing Care orders: x  Skin care: See Skin Care orders:   Rectal tube care: See Rectal Tube  Care orders:   Other orders:    WOUND TEAM PLAN OF CARE   Dressing changes by wound team:          Follow up 1-2 times weekly:               Follow up 3 times weekly:                NPWT change 3 times weekly:  x   Follow up as needed:       Other (explain):     Anticipated discharge plans:  LTACH:        SNF/Rehab:                  Home Care:     X, 2x weekly , patient can be seen with home health 3x weekly until wound clinic has open appointment for wound VAC.     Outpatient Wound Center:    X 1x weekly        Self Care:

## 2020-12-27 LAB
ANION GAP SERPL CALC-SCNC: 10 MMOL/L (ref 7–16)
BASOPHILS # BLD AUTO: 0.3 % (ref 0–1.8)
BASOPHILS # BLD: 0.02 K/UL (ref 0–0.12)
BUN SERPL-MCNC: 6 MG/DL (ref 8–22)
CALCIUM SERPL-MCNC: 8.6 MG/DL (ref 8.5–10.5)
CHLORIDE SERPL-SCNC: 109 MMOL/L (ref 96–112)
CO2 SERPL-SCNC: 23 MMOL/L (ref 20–33)
CREAT SERPL-MCNC: 0.98 MG/DL (ref 0.5–1.4)
EOSINOPHIL # BLD AUTO: 0.23 K/UL (ref 0–0.51)
EOSINOPHIL NFR BLD: 3.7 % (ref 0–6.9)
ERYTHROCYTE [DISTWIDTH] IN BLOOD BY AUTOMATED COUNT: 51.5 FL (ref 35.9–50)
GLUCOSE SERPL-MCNC: 108 MG/DL (ref 65–99)
HCT VFR BLD AUTO: 30.3 % (ref 42–52)
HGB BLD-MCNC: 9.9 G/DL (ref 14–18)
IMM GRANULOCYTES # BLD AUTO: 0.12 K/UL (ref 0–0.11)
IMM GRANULOCYTES NFR BLD AUTO: 1.9 % (ref 0–0.9)
LYMPHOCYTES # BLD AUTO: 0.67 K/UL (ref 1–4.8)
LYMPHOCYTES NFR BLD: 10.9 % (ref 22–41)
MAGNESIUM SERPL-MCNC: 1.9 MG/DL (ref 1.5–2.5)
MCH RBC QN AUTO: 33.1 PG (ref 27–33)
MCHC RBC AUTO-ENTMCNC: 32.7 G/DL (ref 33.7–35.3)
MCV RBC AUTO: 101.3 FL (ref 81.4–97.8)
MONOCYTES # BLD AUTO: 0.75 K/UL (ref 0–0.85)
MONOCYTES NFR BLD AUTO: 12.2 % (ref 0–13.4)
NEUTROPHILS # BLD AUTO: 4.38 K/UL (ref 1.82–7.42)
NEUTROPHILS NFR BLD: 71 % (ref 44–72)
NRBC # BLD AUTO: 0 K/UL
NRBC BLD-RTO: 0 /100 WBC
PHOSPHATE SERPL-MCNC: 3.2 MG/DL (ref 2.5–4.5)
PLATELET # BLD AUTO: 222 K/UL (ref 164–446)
PMV BLD AUTO: 9.2 FL (ref 9–12.9)
POTASSIUM SERPL-SCNC: 3.1 MMOL/L (ref 3.6–5.5)
POTASSIUM SERPL-SCNC: 3.2 MMOL/L (ref 3.6–5.5)
RBC # BLD AUTO: 2.99 M/UL (ref 4.7–6.1)
SODIUM SERPL-SCNC: 142 MMOL/L (ref 135–145)
WBC # BLD AUTO: 6.2 K/UL (ref 4.8–10.8)

## 2020-12-27 PROCEDURE — 99233 SBSQ HOSP IP/OBS HIGH 50: CPT | Performed by: FAMILY MEDICINE

## 2020-12-27 PROCEDURE — 84100 ASSAY OF PHOSPHORUS: CPT

## 2020-12-27 PROCEDURE — 770006 HCHG ROOM/CARE - MED/SURG/GYN SEMI*

## 2020-12-27 PROCEDURE — 700102 HCHG RX REV CODE 250 W/ 637 OVERRIDE(OP): Performed by: FAMILY MEDICINE

## 2020-12-27 PROCEDURE — 36415 COLL VENOUS BLD VENIPUNCTURE: CPT

## 2020-12-27 PROCEDURE — 80048 BASIC METABOLIC PNL TOTAL CA: CPT

## 2020-12-27 PROCEDURE — 85025 COMPLETE CBC W/AUTO DIFF WBC: CPT

## 2020-12-27 PROCEDURE — 83735 ASSAY OF MAGNESIUM: CPT

## 2020-12-27 PROCEDURE — 700111 HCHG RX REV CODE 636 W/ 250 OVERRIDE (IP): Performed by: FAMILY MEDICINE

## 2020-12-27 PROCEDURE — 84132 ASSAY OF SERUM POTASSIUM: CPT

## 2020-12-27 PROCEDURE — 700102 HCHG RX REV CODE 250 W/ 637 OVERRIDE(OP): Performed by: NURSE PRACTITIONER

## 2020-12-27 PROCEDURE — 770020 HCHG ROOM/CARE - TELE (206)

## 2020-12-27 PROCEDURE — A9270 NON-COVERED ITEM OR SERVICE: HCPCS | Performed by: NURSE PRACTITIONER

## 2020-12-27 PROCEDURE — A9270 NON-COVERED ITEM OR SERVICE: HCPCS | Performed by: FAMILY MEDICINE

## 2020-12-27 RX ORDER — MAGNESIUM SULFATE HEPTAHYDRATE 40 MG/ML
2 INJECTION, SOLUTION INTRAVENOUS ONCE
Status: COMPLETED | OUTPATIENT
Start: 2020-12-27 | End: 2020-12-27

## 2020-12-27 RX ORDER — FUROSEMIDE 10 MG/ML
20 INJECTION INTRAMUSCULAR; INTRAVENOUS
Status: DISCONTINUED | OUTPATIENT
Start: 2020-12-27 | End: 2020-12-29 | Stop reason: HOSPADM

## 2020-12-27 RX ORDER — POTASSIUM CHLORIDE 20 MEQ/1
40 TABLET, EXTENDED RELEASE ORAL 3 TIMES DAILY
Status: DISCONTINUED | OUTPATIENT
Start: 2020-12-27 | End: 2020-12-28

## 2020-12-27 RX ADMIN — GABAPENTIN 100 MG: 100 CAPSULE ORAL at 11:20

## 2020-12-27 RX ADMIN — GABAPENTIN 100 MG: 100 CAPSULE ORAL at 05:40

## 2020-12-27 RX ADMIN — POTASSIUM CHLORIDE 40 MEQ: 1500 TABLET, EXTENDED RELEASE ORAL at 05:40

## 2020-12-27 RX ADMIN — AMOXICILLIN AND CLAVULANATE POTASSIUM 1 TABLET: 875; 125 TABLET, FILM COATED ORAL at 05:40

## 2020-12-27 RX ADMIN — FUROSEMIDE 20 MG: 10 INJECTION, SOLUTION INTRAMUSCULAR; INTRAVENOUS at 11:20

## 2020-12-27 RX ADMIN — POTASSIUM CHLORIDE 40 MEQ: 1500 TABLET, EXTENDED RELEASE ORAL at 17:35

## 2020-12-27 RX ADMIN — GABAPENTIN 100 MG: 100 CAPSULE ORAL at 17:35

## 2020-12-27 RX ADMIN — POTASSIUM CHLORIDE 40 MEQ: 1500 TABLET, EXTENDED RELEASE ORAL at 11:20

## 2020-12-27 RX ADMIN — AMLODIPINE BESYLATE 5 MG: 10 TABLET ORAL at 05:40

## 2020-12-27 RX ADMIN — MAGNESIUM SULFATE 2 G: 2 INJECTION INTRAVENOUS at 09:32

## 2020-12-27 RX ADMIN — AMOXICILLIN AND CLAVULANATE POTASSIUM 1 TABLET: 875; 125 TABLET, FILM COATED ORAL at 17:35

## 2020-12-27 ASSESSMENT — ENCOUNTER SYMPTOMS
BLURRED VISION: 0
FLANK PAIN: 0
NERVOUS/ANXIOUS: 0
NECK PAIN: 0
CONSTIPATION: 0
COUGH: 0
FOCAL WEAKNESS: 0
SENSORY CHANGE: 0
WHEEZING: 0
WEAKNESS: 0
BACK PAIN: 0
SPEECH CHANGE: 0
HEARTBURN: 0
ABDOMINAL PAIN: 0
SORE THROAT: 0
HEADACHES: 0
PALPITATIONS: 0
SHORTNESS OF BREATH: 0
DIZZINESS: 0
NAUSEA: 0
DIARRHEA: 0
FEVER: 0
CHILLS: 0
VOMITING: 0
MYALGIAS: 0

## 2020-12-27 NOTE — PROGRESS NOTES
[facility] Medicine Daily Progress Note    Date of Service  12/27/2020    Chief Complaint  70 y.o. male admitted 12/18/2020 with abdominal wall cellulitis.    Hospital Course  Admitted with abdominal wall cellulitis and pelvic fluid collection which was noted on CT scan of abdomen and pelvis.  He had recent ileostomy reversal, he has history of robotic-assisted laparoscopic low anterior resection with creation of a diverting loop ileostomy.  He was placed on IV vancomycin and Zosyn. Surgery was consulted on the case.  He developed acute kidney injury, and IV vancomycin was stopped.  The wound VAC was placed for the abdominal wall wound.  CT-guided drain was placed for the pelvic fluid collection on 12/23/2020 . Flexible sigmoidoscopy was done which showed Wide open stapled anastomosis at 10cm, Boggy edematous mucosa and rectum probably related to XRT, Unprepped examine otherwise normal to midtransverse colon at 90cm on 12/24/2020.  Fluid Gram stain and culture from the drain has been noted to be negative.    Interval Problem Update  Abdominal wall cellulitis - wound vac placed, discussed case with Surgery  Pelvic fluid collection - CT-guided drain placed, fluid culture negative  CRISTHIAN - resolved  HTN - sbp 130-140  Low potassium, magnesium    Consultants/Specialty  Surgery     Code Status  Full Code    Disposition  Home health for wound care    Review of Systems  Review of Systems   Constitutional: Negative for chills, fever and malaise/fatigue.   HENT: Negative for congestion, hearing loss and sore throat.    Eyes: Negative for blurred vision.   Respiratory: Negative for cough, shortness of breath and wheezing.    Cardiovascular: Negative for chest pain and palpitations.   Gastrointestinal: Negative for abdominal pain, constipation, diarrhea, heartburn, nausea and vomiting.   Genitourinary: Negative for dysuria, flank pain and hematuria.   Musculoskeletal: Negative for back pain, joint pain, myalgias and neck pain.    Skin: Negative for rash.   Neurological: Negative for dizziness, sensory change, speech change, focal weakness, weakness and headaches.   Psychiatric/Behavioral: The patient is not nervous/anxious.         Physical Exam  Temp:  [36.6 °C (97.8 °F)-37.3 °C (99.1 °F)] 37.3 °C (99.1 °F)  Pulse:  [73-83] 73  Resp:  [17-18] 18  BP: (130-147)/(72-80) 130/72  SpO2:  [92 %-96 %] 94 %    Physical Exam  Vitals signs and nursing note reviewed.   Constitutional:       Appearance: He is obese.   HENT:      Head: Normocephalic and atraumatic.      Nose: No congestion.      Mouth/Throat:      Mouth: Mucous membranes are moist.   Eyes:      Extraocular Movements: Extraocular movements intact.      Conjunctiva/sclera: Conjunctivae normal.      Pupils: Pupils are equal, round, and reactive to light.   Neck:      Musculoskeletal: No muscular tenderness.   Cardiovascular:      Rate and Rhythm: Normal rate and regular rhythm.   Pulmonary:      Effort: Pulmonary effort is normal.      Breath sounds: Normal breath sounds.   Abdominal:      General: There is no distension.      Tenderness: There is no abdominal tenderness. There is no guarding or rebound.      Comments: Wound vac   Musculoskeletal:      Right lower leg: No edema.      Left lower leg: No edema.   Skin:     Findings: Erythema (abdominal wall) present. Wound: RLQ abdomen.   Neurological:      General: No focal deficit present.      Mental Status: He is alert and oriented to person, place, and time. Mental status is at baseline.         Fluids    Intake/Output Summary (Last 24 hours) at 12/27/2020 0956  Last data filed at 12/27/2020 0740  Gross per 24 hour   Intake 730 ml   Output 370 ml   Net 360 ml       Laboratory  Recent Labs     12/25/20  0154 12/26/20  0401 12/27/20  0312   WBC 6.4 5.8 6.2   RBC 3.40* 2.80* 2.99*   HEMOGLOBIN 11.4* 9.4* 9.9*   HEMATOCRIT 33.9* 28.3* 30.3*   .5* 101.1* 101.3*   MCH 32.9 33.6* 33.1*   MCHC 32.5* 33.2* 32.7*   RDW 49.7 50.0 51.5*    PLATELETCT 228 220 222   MPV 9.6 9.5 9.2     Recent Labs     12/25/20  0154 12/26/20  0401 12/26/20  0808 12/26/20 2010 12/27/20  0312   SODIUM 141 141  --   --  142   POTASSIUM 3.0* 2.6* 2.8* 2.9* 3.1*   CHLORIDE 110 110  --   --  109   CO2 21 22  --   --  23   GLUCOSE 97 107*  --   --  108*   BUN 8 6*  --   --  6*   CREATININE 1.06 0.99  --   --  0.98   CALCIUM 8.9 8.6  --   --  8.6                   Imaging  CT-DRAIN-RETROPERITONEAL   Final Result      Successful presacral pelvic drainage tube placement.      Plan: Twice daily flushes with 10 mL of sterile saline. Monitor outputs. Please contact interventional radiology if there is any concern for tube dysfunction.         DX-CHEST-PORTABLE (1 VIEW)   Final Result      Hypoinflation with bibasilar opacities, atelectasis and/or pneumonitis.      CT-ABDOMEN-PELVIS WITH   Final Result         1.  Presacral mildly enhancing fluid collection, appearance suggests abscess.   2.  Large quantity of stool in the cecum favors changes of constipation. Component of evolving [place]'s or ileus not excluded. Recommend radiographic follow-up to resolution   3.  Bladder wall thickening, consider changes of cystitis, correlate with urinalysis   4.  Bilateral fat-containing inguinal hernias   5.  Small layering left pleural effusion   6.  Fat-containing umbilical hernia   7.  Atherosclerosis and atherosclerotic coronary artery disease.           Assessment/Plan  * Abdominal wall cellulitis- (present on admission)  Assessment & Plan  Augmentin  Wound care    Pelvic fluid collection- (present on admission)  Assessment & Plan  CT guided drain placement 12/23/2020  Follow up with Surgery as outpatient    History of reversal of ileostomy- (present on admission)  Assessment & Plan  Flexible sigmoidoscopy - Wide open stapled anastomosis at 10cm. Boggy edematous mucosa and rectum probably related to XRT. Unprepped examine otherwise normal to midtransverse colon at 90cm.  12/24/2020  Bowel protocol    CRISTHIAN (acute kidney injury) (HCC)- (present on admission)  Assessment & Plan  follow bmp    Sepsis (HCC)- (present on admission)  Assessment & Plan  This is Sepsis Present on admission  SIRS criteria identified on admission include: Fever, with temperature greater than 101 deg F and Tachypnea, with respirations greater than 20 per minute  Source is Cellulitis, Abscess  Sepsis protocol initiated  Fluid resuscitation ordered per protocol  IV antibiotics as appropriate for source of sepsis  While organ dysfunction may be noted elsewhere in this problem list or in the chart, degree of organ dysfunction does not meet CMS criteria for severe sepsis          Hypophosphatemia- (present on admission)  Assessment & Plan  follow level    Thrombocytopenia (HCC)- (present on admission)  Assessment & Plan  Follow cbc    Hypokalemia- (present on admission)  Assessment & Plan  Increase Kdur  Repeat serum potassium today    Macrocytic anemia- (present on admission)  Assessment & Plan  Follow cbc    Hypertension- (present on admission)  Assessment & Plan  Norvasc  Hold Losartan   IV Labetalol as needed    Hypomagnesemia- (present on admission)  Assessment & Plan  IV Mg 2 g  Follow level       VTE prophylaxis: Heparin

## 2020-12-27 NOTE — PROGRESS NOTES
Assessment complete.  A&O x 4. Patient calls appropriately.  Patient ambulates with no assist.   Patient has 0/10 pain.   Denies N&V. Tolerating regular diet.  Wound vac to RLQ. IR drain to buttock  + void, + flatus, + BM.  Patient denies SOB.  Patient complaints of LE edema.    Review plan with of care with patient. Call light and personal belongings with in reach. Hourly rounding in place. All needs met at this time.

## 2020-12-27 NOTE — PROGRESS NOTES
Pt is A&O x4, calls appropriately  Denies pain  Denies nausea  Tolerating a regular liquid diet   Incision to LUQ with CDI wound vac  IR Drain to L buttock, CDI  + Voids  + flatus  Last BM 12/26  Up self  Bed alarm off, pt no fall risk per danis justice  Reviewed plan of care with patient, bed in lowest position and locked, pt resting comfortably now, call light within reach, all needs met at this time. Interventions will be executed per plan of care

## 2020-12-28 LAB
ANION GAP SERPL CALC-SCNC: 11 MMOL/L (ref 7–16)
BASOPHILS # BLD AUTO: 0.4 % (ref 0–1.8)
BASOPHILS # BLD: 0.03 K/UL (ref 0–0.12)
BUN SERPL-MCNC: 8 MG/DL (ref 8–22)
CALCIUM SERPL-MCNC: 9.1 MG/DL (ref 8.5–10.5)
CHLORIDE SERPL-SCNC: 107 MMOL/L (ref 96–112)
CO2 SERPL-SCNC: 24 MMOL/L (ref 20–33)
CREAT SERPL-MCNC: 1.08 MG/DL (ref 0.5–1.4)
EOSINOPHIL # BLD AUTO: 0.25 K/UL (ref 0–0.51)
EOSINOPHIL NFR BLD: 3.3 % (ref 0–6.9)
ERYTHROCYTE [DISTWIDTH] IN BLOOD BY AUTOMATED COUNT: 54.1 FL (ref 35.9–50)
GLUCOSE SERPL-MCNC: 88 MG/DL (ref 65–99)
HCT VFR BLD AUTO: 34.4 % (ref 42–52)
HGB BLD-MCNC: 11.1 G/DL (ref 14–18)
IMM GRANULOCYTES # BLD AUTO: 0.12 K/UL (ref 0–0.11)
IMM GRANULOCYTES NFR BLD AUTO: 1.6 % (ref 0–0.9)
LYMPHOCYTES # BLD AUTO: 0.98 K/UL (ref 1–4.8)
LYMPHOCYTES NFR BLD: 12.9 % (ref 22–41)
MAGNESIUM SERPL-MCNC: 1.9 MG/DL (ref 1.5–2.5)
MCH RBC QN AUTO: 33.2 PG (ref 27–33)
MCHC RBC AUTO-ENTMCNC: 32.3 G/DL (ref 33.7–35.3)
MCV RBC AUTO: 103 FL (ref 81.4–97.8)
MONOCYTES # BLD AUTO: 0.82 K/UL (ref 0–0.85)
MONOCYTES NFR BLD AUTO: 10.8 % (ref 0–13.4)
NEUTROPHILS # BLD AUTO: 5.41 K/UL (ref 1.82–7.42)
NEUTROPHILS NFR BLD: 71 % (ref 44–72)
NRBC # BLD AUTO: 0 K/UL
NRBC BLD-RTO: 0 /100 WBC
PHOSPHATE SERPL-MCNC: 2.6 MG/DL (ref 2.5–4.5)
PLATELET # BLD AUTO: 253 K/UL (ref 164–446)
PMV BLD AUTO: 9.4 FL (ref 9–12.9)
POTASSIUM SERPL-SCNC: 3.4 MMOL/L (ref 3.6–5.5)
RBC # BLD AUTO: 3.34 M/UL (ref 4.7–6.1)
SODIUM SERPL-SCNC: 142 MMOL/L (ref 135–145)
WBC # BLD AUTO: 7.6 K/UL (ref 4.8–10.8)

## 2020-12-28 PROCEDURE — 85025 COMPLETE CBC W/AUTO DIFF WBC: CPT

## 2020-12-28 PROCEDURE — 36415 COLL VENOUS BLD VENIPUNCTURE: CPT

## 2020-12-28 PROCEDURE — 700102 HCHG RX REV CODE 250 W/ 637 OVERRIDE(OP): Performed by: FAMILY MEDICINE

## 2020-12-28 PROCEDURE — 99232 SBSQ HOSP IP/OBS MODERATE 35: CPT | Performed by: FAMILY MEDICINE

## 2020-12-28 PROCEDURE — 83735 ASSAY OF MAGNESIUM: CPT

## 2020-12-28 PROCEDURE — 84100 ASSAY OF PHOSPHORUS: CPT

## 2020-12-28 PROCEDURE — 700111 HCHG RX REV CODE 636 W/ 250 OVERRIDE (IP): Performed by: FAMILY MEDICINE

## 2020-12-28 PROCEDURE — 97605 NEG PRS WND THER DME<=50SQCM: CPT

## 2020-12-28 PROCEDURE — 770020 HCHG ROOM/CARE - TELE (206)

## 2020-12-28 PROCEDURE — A9270 NON-COVERED ITEM OR SERVICE: HCPCS | Performed by: FAMILY MEDICINE

## 2020-12-28 PROCEDURE — 80048 BASIC METABOLIC PNL TOTAL CA: CPT

## 2020-12-28 RX ORDER — POTASSIUM CHLORIDE 20 MEQ/1
40 TABLET, EXTENDED RELEASE ORAL 2 TIMES DAILY
Status: DISCONTINUED | OUTPATIENT
Start: 2020-12-28 | End: 2020-12-29 | Stop reason: HOSPADM

## 2020-12-28 RX ORDER — AMLODIPINE BESYLATE 10 MG/1
10 TABLET ORAL
Status: DISCONTINUED | OUTPATIENT
Start: 2020-12-29 | End: 2020-12-29 | Stop reason: HOSPADM

## 2020-12-28 RX ADMIN — GABAPENTIN 100 MG: 100 CAPSULE ORAL at 17:30

## 2020-12-28 RX ADMIN — POTASSIUM CHLORIDE 40 MEQ: 1500 TABLET, EXTENDED RELEASE ORAL at 17:30

## 2020-12-28 RX ADMIN — AMOXICILLIN AND CLAVULANATE POTASSIUM 1 TABLET: 875; 125 TABLET, FILM COATED ORAL at 05:01

## 2020-12-28 RX ADMIN — GABAPENTIN 100 MG: 100 CAPSULE ORAL at 11:07

## 2020-12-28 RX ADMIN — GABAPENTIN 100 MG: 100 CAPSULE ORAL at 05:01

## 2020-12-28 RX ADMIN — FUROSEMIDE 20 MG: 10 INJECTION, SOLUTION INTRAMUSCULAR; INTRAVENOUS at 05:01

## 2020-12-28 RX ADMIN — POTASSIUM CHLORIDE 40 MEQ: 1500 TABLET, EXTENDED RELEASE ORAL at 05:01

## 2020-12-28 RX ADMIN — Medication 400 MG: at 17:30

## 2020-12-28 RX ADMIN — POTASSIUM CHLORIDE 40 MEQ: 1500 TABLET, EXTENDED RELEASE ORAL at 11:08

## 2020-12-28 RX ADMIN — AMLODIPINE BESYLATE 5 MG: 10 TABLET ORAL at 05:01

## 2020-12-28 RX ADMIN — AMOXICILLIN AND CLAVULANATE POTASSIUM 1 TABLET: 875; 125 TABLET, FILM COATED ORAL at 17:30

## 2020-12-28 RX ADMIN — Medication 400 MG: at 11:07

## 2020-12-28 ASSESSMENT — ENCOUNTER SYMPTOMS
NECK PAIN: 0
SENSORY CHANGE: 0
WHEEZING: 0
ABDOMINAL PAIN: 0
HEARTBURN: 0
BLURRED VISION: 0
FLANK PAIN: 0
MYALGIAS: 0
SPEECH CHANGE: 0
DIZZINESS: 0
FEVER: 0
CHILLS: 0
VOMITING: 0
PALPITATIONS: 0
DIARRHEA: 0
NERVOUS/ANXIOUS: 0
CONSTIPATION: 0
WEAKNESS: 0
NAUSEA: 0
SORE THROAT: 0
COUGH: 0
BACK PAIN: 0
SHORTNESS OF BREATH: 0
FOCAL WEAKNESS: 0
HEADACHES: 0

## 2020-12-28 NOTE — PROGRESS NOTES
[facility] Medicine Daily Progress Note    Date of Service  12/28/2020    Chief Complaint  70 y.o. male admitted 12/18/2020 with abdominal wall cellulitis.    Hospital Course  Admitted with abdominal wall cellulitis and pelvic fluid collection which was noted on CT scan of abdomen and pelvis.  He had recent ileostomy reversal, he has history of robotic-assisted laparoscopic low anterior resection with creation of a diverting loop ileostomy.  He was placed on IV vancomycin and Zosyn. Surgery was consulted on the case.  He developed acute kidney injury, and IV vancomycin was stopped.  The wound VAC was placed for the abdominal wall wound.  CT-guided drain was placed for the pelvic fluid collection on 12/23/2020 . Flexible sigmoidoscopy was done which showed Wide open stapled anastomosis at 10cm, Boggy edematous mucosa and rectum probably related to XRT, Unprepped examine otherwise normal to midtransverse colon at 90cm on 12/24/2020.  Fluid Gram stain and culture from the drain has been noted to be negative.  Surgery recommending drain to remain in place until follow-up with them as outpatient.    Interval Problem Update  Abdominal wall cellulitis - wound vac placed  Pelvic fluid collection - CT-guided drain placed, fluid culture negative  CRISTHIAN - resolved  HTN - sbp 130-150  Low potassium, magnesium    Consultants/Specialty  Surgery     Code Status  Full Code    Disposition  Home health for wound care    Review of Systems  Review of Systems   Constitutional: Negative for chills, fever and malaise/fatigue.   HENT: Negative for congestion, hearing loss and sore throat.    Eyes: Negative for blurred vision.   Respiratory: Negative for cough, shortness of breath and wheezing.    Cardiovascular: Negative for chest pain and palpitations.   Gastrointestinal: Negative for abdominal pain, constipation, diarrhea, heartburn, nausea and vomiting.   Genitourinary: Negative for dysuria, flank pain and hematuria.   Musculoskeletal:  Negative for back pain, joint pain, myalgias and neck pain.   Skin: Negative for rash.   Neurological: Negative for dizziness, sensory change, speech change, focal weakness, weakness and headaches.   Psychiatric/Behavioral: The patient is not nervous/anxious.         Physical Exam  Temp:  [36.3 °C (97.4 °F)-36.8 °C (98.3 °F)] 36.6 °C (97.9 °F)  Pulse:  [77-86] 77  Resp:  [17-18] 18  BP: (129-152)/(66-80) 143/71  SpO2:  [94 %-96 %] 94 %    Physical Exam  Vitals signs and nursing note reviewed.   Constitutional:       Appearance: He is obese.   HENT:      Head: Normocephalic and atraumatic.      Nose: No congestion.      Mouth/Throat:      Mouth: Mucous membranes are moist.   Eyes:      Extraocular Movements: Extraocular movements intact.      Conjunctiva/sclera: Conjunctivae normal.      Pupils: Pupils are equal, round, and reactive to light.   Neck:      Musculoskeletal: No muscular tenderness.   Cardiovascular:      Rate and Rhythm: Normal rate and regular rhythm.   Pulmonary:      Effort: Pulmonary effort is normal.      Breath sounds: Normal breath sounds.   Abdominal:      General: There is no distension.      Tenderness: There is no abdominal tenderness. There is no guarding or rebound.      Comments: Wound vac   Musculoskeletal:      Right lower leg: No edema.      Left lower leg: No edema.   Skin:     Findings: Erythema (abdominal wall) present. Wound: RLQ abdomen.   Neurological:      General: No focal deficit present.      Mental Status: He is alert and oriented to person, place, and time. Mental status is at baseline.         Fluids    Intake/Output Summary (Last 24 hours) at 12/28/2020 1542  Last data filed at 12/28/2020 1115  Gross per 24 hour   Intake 840 ml   Output 10 ml   Net 830 ml       Laboratory  Recent Labs     12/26/20  0401 12/27/20  0312 12/28/20  0909   WBC 5.8 6.2 7.6   RBC 2.80* 2.99* 3.34*   HEMOGLOBIN 9.4* 9.9* 11.1*   HEMATOCRIT 28.3* 30.3* 34.4*   .1* 101.3* 103.0*   MCH 33.6*  33.1* 33.2*   MCHC 33.2* 32.7* 32.3*   RDW 50.0 51.5* 54.1*   PLATELETCT 220 222 253   MPV 9.5 9.2 9.4     Recent Labs     12/26/20  0401 12/26/20  0401 12/27/20  0312 12/27/20  1100 12/28/20  0909   SODIUM 141  --  142  --  142   POTASSIUM 2.6*   < > 3.1* 3.2* 3.4*   CHLORIDE 110  --  109  --  107   CO2 22  --  23  --  24   GLUCOSE 107*  --  108*  --  88   BUN 6*  --  6*  --  8   CREATININE 0.99  --  0.98  --  1.08   CALCIUM 8.6  --  8.6  --  9.1    < > = values in this interval not displayed.                   Imaging  CT-DRAIN-RETROPERITONEAL   Final Result      Successful presacral pelvic drainage tube placement.      Plan: Twice daily flushes with 10 mL of sterile saline. Monitor outputs. Please contact interventional radiology if there is any concern for tube dysfunction.         DX-CHEST-PORTABLE (1 VIEW)   Final Result      Hypoinflation with bibasilar opacities, atelectasis and/or pneumonitis.      CT-ABDOMEN-PELVIS WITH   Final Result         1.  Presacral mildly enhancing fluid collection, appearance suggests abscess.   2.  Large quantity of stool in the cecum favors changes of constipation. Component of evolving Dietrich's or ileus not excluded. Recommend radiographic follow-up to resolution   3.  Bladder wall thickening, consider changes of cystitis, correlate with urinalysis   4.  Bilateral fat-containing inguinal hernias   5.  Small layering left pleural effusion   6.  Fat-containing umbilical hernia   7.  Atherosclerosis and atherosclerotic coronary artery disease.           Assessment/Plan  * Abdominal wall cellulitis- (present on admission)  Assessment & Plan  Augmentin  Wound care    Pelvic fluid collection- (present on admission)  Assessment & Plan  CT guided drain placement 12/23/2020  Follow up with Surgery as outpatient    History of reversal of ileostomy- (present on admission)  Assessment & Plan  Flexible sigmoidoscopy - Wide open stapled anastomosis at 10cm. Boggy edematous mucosa and rectum  probably related to XRT. Unprepped examine otherwise normal to midtransverse colon at 90cm. 12/24/2020  Bowel protocol    CRISTHIAN (acute kidney injury) (HCC)- (present on admission)  Assessment & Plan  follow bmp    Sepsis (HCC)- (present on admission)  Assessment & Plan  This is Sepsis Present on admission  SIRS criteria identified on admission include: Fever, with temperature greater than 101 deg F and Tachypnea, with respirations greater than 20 per minute  Source is Cellulitis, Abscess  Sepsis protocol initiated  Fluid resuscitation ordered per protocol  IV antibiotics as appropriate for source of sepsis  While organ dysfunction may be noted elsewhere in this problem list or in the chart, degree of organ dysfunction does not meet CMS criteria for severe sepsis          Hypophosphatemia- (present on admission)  Assessment & Plan  follow level    Thrombocytopenia (HCC)- (present on admission)  Assessment & Plan  Follow cbc    Hypokalemia- (present on admission)  Assessment & Plan  Kdur, follow bmp    Macrocytic anemia- (present on admission)  Assessment & Plan  Follow cbc    Hypertension- (present on admission)  Assessment & Plan  Increase Norvasc to 10 mg  Hold Losartan   IV Labetalol as needed    Hypomagnesemia- (present on admission)  Assessment & Plan  Oral Mg  IV Mg given  Follow level       VTE prophylaxis: Heparin

## 2020-12-28 NOTE — DISCHARGE INSTRUCTIONS
Discharge Instructions    Discharged to home by car with relative. Discharged via wheelchair, hospital escort: Yes.  Special equipment needed: Not Applicable    Be sure to schedule a follow-up appointment with your primary care doctor or any specialists as instructed.     Discharge Plan:   Diet Plan: Discussed  Activity Level: Discussed  Confirmed Follow up Appointment: Patient to Call and Schedule Appointment  Confirmed Symptoms Management: Discussed  Medication Reconciliation Updated: No (Comments)  Influenza Vaccine Indication: Not indicated: Previously immunized this influenza season and > 8 years of age    I understand that a diet low in cholesterol, fat, and sodium is recommended for good health. Unless I have been given specific instructions below for another diet, I accept this instruction as my diet prescription.   Other diet: regular    Special Instructions: None    · Is patient discharged on Warfarin / Coumadin?   No     Depression / Suicide Risk    As you are discharged from this [facility] facility, it is important to learn how to keep safe from harming yourself.    Recognize the warning signs:  · Abrupt changes in personality, positive or negative- including increase in energy   · Giving away possessions  · Change in eating patterns- significant weight changes-  positive or negative  · Change in sleeping patterns- unable to sleep or sleeping all the time   · Unwillingness or inability to communicate  · Depression  · Unusual sadness, discouragement and loneliness  · Talk of wanting to die  · Neglect of personal appearance   · Rebelliousness- reckless behavior  · Withdrawal from people/activities they love  · Confusion- inability to concentrate     If you or a loved one observes any of these behaviors or has concerns about self-harm, here's what you can do:  · Talk about it- your feelings and reasons for harming yourself  · Remove any means that you might use to hurt yourself (examples: pills, rope,  extension cords, firearm)  · Get professional help from the community (Mental Health, Substance Abuse, psychological counseling)  · Do not be alone:Call your Safe Contact- someone whom you trust who will be there for you.  · Call your local CRISIS HOTLINE 916-2264 or 481-479-5580  · Call your local Children's Mobile Crisis Response Team [place] (138) 077-5400 or www.[employer]  · Call the toll free National Suicide Prevention Hotlines   · National Suicide Prevention Lifeline 167-129-QBJO (0133)  · National Hope Line Network 800-SUICIDE (846-7785)    Negative Pressure Wound Therapy Home Guide  Negative pressure wound therapy (NPWT) uses a sponge or foam-like material (dressing) placed on or inside the wound. The wound is then covered and sealed with a cover dressing that sticks to your skin (is adhesive). This keeps air out. A tube is attached to the cover dressing, and this tube connects to a small pump. The pump sucks fluid and germs from the wound. NPWT helps to increase blood flow to the wound and heal it from the inside.  What are the risks?  NPWT is usually safe to use. However, problems can occur, including:  · Skin irritation from the dressing adhesive.  · Bleeding.  · Infection.  · Dehydration. Wounds with large amounts of drainage can cause excessive fluid loss.  · Pain.  Supplies needed:  · A disposable garbage bag.  · Soap and water, or hand .  · Wound cleanser or salt-water solution (saline).  · New sponge and cover dressing.  · Protective clothing.  · Gauze pad.  · Vinyl gloves.  · Tape.  · Skin protectant. This may be a wipe, film, or spray.  · Clean or germ-free (sterile) scissors.  · Eye protection.  How to change your dressing  Prepare to change your dressing    1. If told by your health care provider, take pain medicine 30 minutes before changing the dressing.  2. Wash your hands with soap and water. Dry your hands with a clean towel. If soap and water are not available, use  hand .  3. Set up a clean station for wound care.  4. Open the dressing package so that the sponge dressing remains on the inside of the package.  5. Wear gloves, protective clothing, and eye protection.  Remove old dressing    1. Turn off the pump and disconnect the tubing from the dressing.  2. Carefully remove the adhesive cover dressing in the direction of your hair growth.  3. Remove the sponge dressing that is inside the wound. If the sponge sticks, use a wound cleanser or saline solution to wet the sponge and help it come off more easily.  4. Throw the old sponge and cover dressing supplies into the garbage bag.  5. Remove your gloves by grabbing the cuff and turning the glove inside out. Place the gloves in the trash immediately.  6. Wash your hands with soap and water. Dry your hands with a clean towel. If soap and water are not available, use hand .  Clean your wound  · Wear gloves, protective clothing, and eye protection.  Follow your health care provider's instructions on how to clean your wound. You may be told to:  1. Clean the wound using a saline solution or a wound cleanser and a clean gauze pad.  2. Pat the wound dry with a gauze pad. Do not rub the wound.  3. Throw the gauze pad into the garbage bag.  4. Remove your gloves by grabbing the cuff and turning the glove inside out. Place the gloves in the trash immediately.  5. Wash your hands with soap and water. Dry your hands with a clean towel. If soap and water are not available, use hand .  Apply new dressing  · Wear gloves, protective clothing, and eye protection.  1. If told by your health care provider, apply a skin protectant to any skin that will be exposed to adhesive. Let the skin protectant dry.  2. Cut a piece of new sponge dressing and put it on or in the wound.  3. Using clean scissors, cut a nickel-sized hole in the new cover dressing.  4. Apply the cover dressing.  5. Attach the suction tube over the hole  in the cover dressing.  6. Take off your gloves. Put them in the plastic bag with the old dressing. Tie the bag shut and throw it away.  7. Wash your hands with soap and water. Dry your hands with a clean towel. If soap and water are not available, use hand .  8. Turn the pump back on. The sponge dressing should collapse. Do not change the settings on the machine without talking to a health care provider.  9. Replace the container in the pump that collects fluid if it is full. Replace the container per the 's instructions or at least once a week, even if it is not full.  General tips and recommendations  If the alarm sounds:  · Stay calm.  · Do not turn off the pump or do anything with the dressing.  · Reasons the alarm may go off:  ? The battery is low. Change the battery or plug the device into electrical power.  ? The dressing has a leak. Find the leak and put tape over the leak.  ? The fluid collection container is full. Change the fluid container.  · Call your health care provider right away if you cannot fix the problem.  · Explain to your health care provider what is happening. Follow his or her instructions.  General instructions  · Do not turn off the pump unless told to do so by your health care provider.  · Do not turn off the pump for more than 2 hours. If the pump is off for more than 2 hours, the dressing will need to be changed.  · If your health care provider says it is okay to shower:  ? Do not take the pump into the shower.  ? Make sure the wound dressing is protected and sealed. The wound dressing must stay dry.  · Check frequently that the machine indicates that therapy is on and that all clamps are open.  · Do not use over-the-counter medicated or antiseptic creams, sprays, liquids, or dressings unless your health care provider approves.  Contact a health care provider if:  · You have new pain.  · You develop irritation, a rash, or itching around the wound or  dressing.  · You see new black or yellow tissue in your wound.  · The dressing changes are painful or cause bleeding.  · The pump has been off for more than 2 hours, and you do not know how to change the dressing.  · The pump alarm goes off, and you do not know what to do.  Get help right away if:  · You have a lot of bleeding.  · The wound breaks open.  · You have severe pain.  · You have signs of infection, such as:  ? More redness, swelling, or pain.  ? More fluid or blood.  ? Warmth.  ? Pus or a bad smell.  ? Red streaks leading from the wound.  ? A fever.  · You see a sudden change in the color or texture of the drainage.  · You have signs of dehydration, such as:  ? Little or no tears, urine, or sweat.  ? Muscle cramps.  ? Very dry mouth.  ? Headache.  ? Dizziness.  Summary  · Negative pressure wound therapy (NPWT) is a device that helps your wound heal.  · Set up a clean station for wound care. Your health care provider will tell you what supplies to use.  · Follow your health care provider's instructions on how to clean your wound and how to change the dressing.  · Contact a health care provider if you have new pain, an irritation, or a rash, or if the alarm goes off and you do not know what to do.  · Get help right away if you have a lot of bleeding, your wound breaks open, or you have severe pain. Also, get help if you have signs of infection.  This information is not intended to replace advice given to you by your health care provider. Make sure you discuss any questions you have with your health care provider.  Document Released: 03/11/2013 Document Revised: 04/10/2020 Document Reviewed: 03/06/2020  Elsevier Patient Education © 2020 [employer]    Percutaneous Abscess Drain, Care After  This sheet gives you information about how to care for yourself after your procedure. Your health care provider may also give you more specific instructions. If you have problems or questions, contact your health care  provider.  What can I expect after the procedure?  After your procedure, it is common to have:  · A small amount of bruising and discomfort in the area where the drainage tube (catheter) was placed.  · Sleepiness and fatigue. This should go away after the medicines you were given have worn off.  Follow these instructions at home:  Incision care  · Follow instructions from your health care provider about how to take care of your incision. Make sure you:  ? Wash your hands with soap and water before you change your bandage (dressing). If soap and water are not available, use hand .  ? Change your dressing as told by your health care provider.  ? Leave stitches (sutures), skin glue, or adhesive strips in place. These skin closures may need to stay in place for 2 weeks or longer. If adhesive strip edges start to loosen and curl up, you may trim the loose edges. Do not remove adhesive strips completely unless your health care provider tells you to do that.  · Check your incision area every day for signs of infection. Check for:  ? More redness, swelling, or pain.  ? More fluid or blood.  ? Warmth.  ? Pus or a bad smell.  ? Fluid leaking from around your catheter (instead of fluid draining through your catheter).  Catheter care    · Follow instructions from your health care provider about emptying and cleaning your catheter and collection bag. You may need to clean the catheter every day so it does not clog.  · If directed, write down the following information every time you empty your bag:  ? The date and time.  ? The amount of drainage.  General instructions  · Rest at home for 1-2 days after your procedure. Return to your normal activities as told by your health care provider.  · Do not take baths, swim, or use a hot tub for 24 hours after your procedure, or until your health care provider says that this is okay.  · Take over-the-counter and prescription medicines only as told by your health care  provider.  · Keep all follow-up visits as told by your health care provider. This is important.  Contact a health care provider if:  · You have less than 10 mL of drainage a day for 2-3 days in a row, or as directed by your health care provider.  · You have more redness, swelling, or pain around your incision area.  · You have more fluid or blood coming from your incision area.  · Your incision area feels warm to the touch.  · You have pus or a bad smell coming from your incision area.  · You have fluid leaking from around your catheter (instead of through your catheter).  · You have a fever or chills.  · You have pain that does not get better with medicine.  Get help right away if:  · Your catheter comes out.  · You suddenly stop having drainage from your catheter.  · You suddenly have blood in the fluid that is draining from your catheter.  · You become dizzy or you faint.  · You develop a rash.  · You have nausea or vomiting.  · You have difficulty breathing or you feel short of breath.  · You develop chest pain.  · You have problems with your speech or vision.  · You have trouble balancing or moving your arms or legs.  Summary  · It is common to have a small amount of bruising and discomfort in the area where the drainage tube (catheter) was placed.  · You may be directed to record the amount of drainage from the bag every time you empty it.  · Follow instructions from your health care provider about emptying and cleaning your catheter and collection bag.  This information is not intended to replace advice given to you by your health care provider. Make sure you discuss any questions you have with your health care provider.  Document Released: 05/04/2015 Document Revised: 11/30/2018 Document Reviewed: 11/09/2017  Elsevier Patient Education © 2020 [employer]

## 2020-12-28 NOTE — PROGRESS NOTES
Assessment complete.  A&O x 4. Patient calls appropriately.  Patient ambulates with no assist.   Patient has 0/10 pain.   Denies N&V. Tolerating regular diet.  Wound vac to RLQ. IR drain to L buttock   + void, + flatus, 12/28 BM.  Patient denies SOB.  Patient sitting up in chair, ambulating frequently .  Review plan with of care with patient. Call light and personal belongings with in reach. Hourly rounding in place. All needs met at this time.

## 2020-12-28 NOTE — DISCHARGE PLANNING
Agency/Facility Name: [facility]  Spoke To: Hsarita  Outcome: Pt. Appointment has to be moved due to staffing issues.   LIDIA Viera notified

## 2020-12-28 NOTE — DISCHARGE PLANNING
Agency/Facility Name: [facility]  Spoke To: Anabelle  Outcome: Pt. Accepted  RNCM Maximiliano notified

## 2020-12-28 NOTE — WOUND TEAM
[facility]  Inpatient Services  Wound and Skin Care Follow Up    Admission Date: 12/18/2020     Last order of IP CONSULT TO WOUND CARE was found on 12/21/2020 from Hospital Encounter on 12/18/2020       HPI, PMH, SH: Reviewed    Unit where seen by Wound Team: [id number]     WOUND CONSULT/FOLLOW UP RELATED TO:  Right LQ abdomen, scheduled wound vac dressing change    Self Report / Pain Level:  Pt declined need for pain med.  Tolerated dressing change well. States he is just waiting for the VAC for home so he can be discharged.    OBJECTIVE:  Pt sitting in chair. Able to transfer to bed and chair with standby assist.    WOUND TYPE, LOCATION, CHARACTERISTICS (Pressure Injuries: location, stage, POA or date identified)        Negative Pressure Wound Therapy 12/23/20 Surgical Abdomen Lower Right (Active)   NPWT Pump Mode / Pressure Setting Continuous;125 mmHg 12/28/20 1000   Dressing Type Medium;Black Foam (Regular) 12/28/20 1000   Number of Foam Pieces Used 2 12/28/20 1000   Canister Changed No 12/26/20 2100   Output (mL) 0 mL 12/28/20 0750   NEXT Dressing Change/Treatment Date 12/26/20 12/23/20 2100   WOUND NURSE ONLY - Time Spent with Patient (mins) 60 12/26/20 1100            Wound 12/15/20 Incision Abdomen Right 4x4 and tegaderm (Active)   Wound Image   12/28/20 1000   Site Assessment Red;Yellow;Early/partial granulation 12/28/20 1000   Periwound Assessment Intact 12/28/20 1000   Margins Attached edges 12/28/20 1000   Closure Secondary intention 12/28/20 1000   Drainage Amount Moderate 12/28/20 1000   Drainage Description Serosanguineous 12/28/20 1000   Treatments Cleansed;Irrigation;Site care 12/28/20 1000   Wound Cleansing Normal Saline Irrigation 12/28/20 1000   Periwound Protectant Benzoin;Drape 12/28/20 1000   Dressing Cleansing/Solutions Not Applicable 12/23/20 1500   Dressing Options Wound Vac 12/28/20 1000   Dressing Changed Changed 12/28/20 1000   Dressing Status Clean;Dry;Intact 12/28/20 1000    Dressing Change/Treatment Frequency Monday, Wednesday, Friday, and As Needed 12/28/20 1000   NEXT Dressing Change/Treatment Date 12/30/20 12/28/20 1000   NEXT Weekly Photo (Inpatient Only) 12/30/20 12/23/20 1500   Non-staged Wound Description Full thickness 12/26/20 1100   Wound Length (cm) 4.6 cm 12/28/20 1000   Wound Width (cm) 6.5 cm 12/28/20 1000   Wound Depth (cm) 2 cm 12/28/20 1000   Wound Surface Area (cm^2) 29.9 cm^2 12/28/20 1000   Wound Volume (cm^3) 59.8 cm^3 12/28/20 1000   Wound Healing % 11 12/28/20 1000   Tunneling (cm) 6.3 cm 12/28/20 1000   Tunneling Clock Position of Wound 2 12/28/20 1000   Shape Circular 12/23/20 1500   Wound Odor None 12/26/20 1100   Exposed Structures None 12/26/20 1100   WOUND NURSE ONLY - Time Spent with Patient (mins) 60 12/26/20 1100            Vascular:    LEE ANN:   No results found.      Lab Values:    Lab Results   Component Value Date/Time    WBC 7.6 12/28/2020 09:09 AM    RBC 3.34 (L) 12/28/2020 09:09 AM    HEMOGLOBIN 11.1 (L) 12/28/2020 09:09 AM    HEMATOCRIT 34.4 (L) 12/28/2020 09:09 AM    CREACTPROT 15.80 (H) 12/20/2020 02:02 PM          Culture: none  Culture Results show:  No results found for this or any previous visit (from the past 720 hour(s)).      INTERVENTIONS BY WOUND TEAM:  Chart reviewed.  RLQ abdomen wound vac dressing removed.  Wound bed cleansed with normal saline and patted dry with gauze.  Effie-wound protected with benzoin and drape. 1 black foam gently wicked to the depth of the tract, and 1 black foam for the wound bed and TRAC pad.  NPWT resumed at 125mmHg, no leaks noted.     Interdisciplinary consultation: Patient, Bedside RN    EVALUATION: Patient had a ileostomy reversal done by Dr. Aquino on 12/15, discharged next day.  Patient then developed increased abdominal distention and erythema to the abdomen.  Patient came in for evaluation, found to have a fluid collection, concern for abscess at the presacral area.      Goals: Steady decrease in  wound area and depth weekly.    NURSING PLAN OF CARE ORDERS (X):    Dressing changes: See Dressing Care orders: x  Skin care: See Skin Care orders:   Rectal tube care: See Rectal Tube Care orders:   Other orders:    WOUND TEAM PLAN OF CARE   Dressing changes by wound team:          Follow up 1-2 times weekly:               Follow up 3 times weekly:                NPWT change 3 times weekly:  x   Follow up as needed:       Other (explain):     Anticipated discharge plans:  LTACH:        SNF/Rehab:                  Home Care:     X, 2x weekly , patient can be seen with home health 3x weekly until wound clinic has open appointment for wound VAC.     Outpatient Wound Center:    X 1x weekly        Self Care:

## 2020-12-28 NOTE — PROGRESS NOTES
Monitor Summary     Tele started at 1100     SR 81-90     .16/.08/.32    Per monitor room strip summary

## 2020-12-28 NOTE — DISCHARGE PLANNING
Agency/Facility Name: [facility]  Outcome: L/M for Anabelle requesting referral status

## 2020-12-28 NOTE — DISCHARGE SUMMARY
Discharge Summary    CHIEF COMPLAINT ON ADMISSION  Chief Complaint   Patient presents with   • Post-Op Complications       Reason for Admission  Post-op complication     Admission Date  12/18/2020    CODE STATUS  Full Code    HPI & HOSPITAL COURSE  This is a 70 y.o. male here with abdominal wall cellulitis and pelvic fluid collection which was noted on CT scan of abdomen and pelvis.  He had recent ileostomy reversal, he has history of robotic-assisted laparoscopic low anterior resection with creation of a diverting loop ileostomy.  He was placed on IV vancomycin and Zosyn. Surgery was consulted on the case.  He developed acute kidney injury, and IV vancomycin was stopped.  The wound VAC was placed for the abdominal wall wound.  CT-guided drain was placed for the pelvic fluid collection on 12/23/2020 . Flexible sigmoidoscopy was done which showed Wide open stapled anastomosis at 10cm, Boggy edematous mucosa and rectum probably related to XRT, Unprepped examine otherwise normal to midtransverse colon at 90cm on 12/24/2020.  Fluid Gram stain and culture from the drain has been noted to be negative.  Surgery has recommended the drain remain in place until follow-up with them as outpatient.  His multiple electrolyte deficiencies were replaced.  Surgery has cleared him for discharge.    Therefore, he is discharged in good and stable condition to home with organized home healthcare and close outpatient follow-up.    The patient met 2-midnight criteria for an inpatient stay at the time of discharge.    Discharge Date  12/28/2020    FOLLOW UP ITEMS POST DISCHARGE  Follow up with home health  Follow-up with outpatient wound care  Follow-up with surgery as scheduled    DISCHARGE DIAGNOSES  Principal Problem:    Abdominal wall cellulitis POA: Yes  Active Problems:    Sepsis (HCC) POA: Yes    CRISTHIAN (acute kidney injury) (HCC) POA: Yes    History of reversal of ileostomy POA: Yes    Pelvic fluid collection POA: Yes    Hypertension  POA: Yes    Macrocytic anemia POA: Yes    Hypokalemia POA: Yes    Thrombocytopenia (HCC) POA: Yes    Hypophosphatemia POA: Yes    Hypomagnesemia POA: Yes  Resolved Problems:    * No resolved hospital problems. *      FOLLOW UP  Future Appointments   Date Time Provider Department Center   12/30/2020  3:00 PM FELISHA Hodges [facility] [address] [place] [id number]  [phone]        Wound Care Center  [address]  [facility] [address]  [phone]  Go on 12/30/2020  3:00 PM       MEDICATIONS ON DISCHARGE     Medication List      ASK your doctor about these medications      Instructions   gabapentin 100 MG Caps  Commonly known as: NEURONTIN   Take 200 mg by mouth 3 times a day.  Dose: 200 mg     losartan 100 MG Tabs  Commonly known as: COZAAR   Take 100 mg by mouth every evening. Take 1 tablet by mouth every day  Dose: 100 mg     metroNIDAZOLE 500 MG Tabs  Commonly known as: FLAGYL   Take 1,000 mg by mouth 3 times a day. 1300,1400 & 2300 day before surgery  Dose: 1,000 mg     neomycin 500 MG Tabs  Commonly known as: MYCIFRADIN   Take 500 mg by mouth 3 times a day. 1300,1400,2300 day before surgery  Dose: 500 mg     Vistaril 25 MG Caps  Generic drug: hydrOXYzine pamoate   Take 25 mg by mouth every evening.  Dose: 25 mg            Allergies  No Known Allergies    DIET  Orders Placed This Encounter   Procedures   • Diet Order Diet: Regular     Standing Status:   Standing     Number of Occurrences:   1     Order Specific Question:   Diet:     Answer:   Regular [1]       ACTIVITY  As tolerated.  Weight bearing as tolerated    CONSULTATIONS  [facility] [place]  [facility]     PROCEDURES  CT-guided drain placement 12/23/2020    Flexible sigmoidoscopy - Wide open stapled anastomosis at 10cm. Boggy edematous mucosa and rectum probably related to XRT. Unprepped examine otherwise normal to midtransverse colon at 90cm.  12/24/2020    LABORATORY  Lab Results   Component Value Date    SODIUM 142 12/28/2020    POTASSIUM 3.4 (L) 12/28/2020    CHLORIDE 107 12/28/2020    CO2 24 12/28/2020    GLUCOSE 88 12/28/2020    BUN 8 12/28/2020    CREATININE 1.08 12/28/2020        Lab Results   Component Value Date    WBC 7.6 12/28/2020    HEMOGLOBIN 11.1 (L) 12/28/2020    HEMATOCRIT 34.4 (L) 12/28/2020    PLATELETCT 253 12/28/2020        Total time of the discharge process exceeds 45 minutes.

## 2020-12-28 NOTE — PROGRESS NOTES
Monitor summary  SR 63-93  Rare PVCs, rare PACs, bigeminy, burst of PSVT, run up into the 180s  .16/.08/.38  Per monitor room strip summary.

## 2020-12-28 NOTE — DISCHARGE PLANNING
Anticipated Discharge Disposition:   Home with Home Health vs OP Wound Clinic Follow Up    Action:   This RN CARROL is following the case. Per Dr Keenan, Pt agreed to be referred to [facility]  OP Wound Clinic.  Spoke with Jennifer ,  who states that the [facility] OP Wound Clinic does not accept ostomy wound .    Pt has an appointment with [facility] on 12/30/20 at 15:00 .   Will inform Pt. The address is [address] [place] [address] , [facility] [place] [address].    Pt is also pending acceptance with [facility].  Requested TANYA Delatorrei to follow up with [facility].    Barriers to Discharge:   Home Health acceptance for wound care    Plan:   Will continue to follow and assist Pt with discharge as needed.     Addendum:    Per DEYSI Ybarra, Pt needs a wound vac.  Clinicals for Pt's acceptance faxed to [facility] and signed [facility] wound vac order form faxed to [facility] too.  Requested [facility] to have the wound vac delivered to bedside today.     Pt's wife, Kaleb agreed with this plan.     Will continue to follow and assist as needed.     15:28 This RN CM received a call from [facility] that [facility] is now out of wound vacs and the soonest available time they can deliver is tomorrow around 12 noon. Informed DEYSI Ybarra and Dr Keenan via [employer].

## 2020-12-29 VITALS
HEIGHT: 69 IN | RESPIRATION RATE: 17 BRPM | WEIGHT: 205.25 LBS | DIASTOLIC BLOOD PRESSURE: 86 MMHG | BODY MASS INDEX: 30.4 KG/M2 | OXYGEN SATURATION: 93 % | HEART RATE: 73 BPM | TEMPERATURE: 98.9 F | SYSTOLIC BLOOD PRESSURE: 146 MMHG

## 2020-12-29 PROBLEM — R18.8 PELVIC FLUID COLLECTION: Status: RESOLVED | Noted: 2020-12-22 | Resolved: 2020-12-29

## 2020-12-29 PROBLEM — A41.9 SEPSIS (HCC): Status: RESOLVED | Noted: 2020-12-19 | Resolved: 2020-12-29

## 2020-12-29 PROBLEM — E83.39 HYPOPHOSPHATEMIA: Status: RESOLVED | Noted: 2020-12-23 | Resolved: 2020-12-29

## 2020-12-29 PROBLEM — E83.42 HYPOMAGNESEMIA: Status: RESOLVED | Noted: 2020-12-25 | Resolved: 2020-12-29

## 2020-12-29 PROBLEM — N17.9 AKI (ACUTE KIDNEY INJURY) (HCC): Status: RESOLVED | Noted: 2020-12-20 | Resolved: 2020-12-29

## 2020-12-29 PROBLEM — D69.6 THROMBOCYTOPENIA (HCC): Status: RESOLVED | Noted: 2020-12-22 | Resolved: 2020-12-29

## 2020-12-29 PROBLEM — E87.6 HYPOKALEMIA: Status: RESOLVED | Noted: 2020-12-19 | Resolved: 2020-12-29

## 2020-12-29 PROBLEM — L03.311 ABDOMINAL WALL CELLULITIS: Status: RESOLVED | Noted: 2020-12-18 | Resolved: 2020-12-29

## 2020-12-29 LAB
ANION GAP SERPL CALC-SCNC: 11 MMOL/L (ref 7–16)
BASOPHILS # BLD AUTO: 0.3 % (ref 0–1.8)
BASOPHILS # BLD: 0.02 K/UL (ref 0–0.12)
BUN SERPL-MCNC: 9 MG/DL (ref 8–22)
CALCIUM SERPL-MCNC: 9.1 MG/DL (ref 8.5–10.5)
CHLORIDE SERPL-SCNC: 111 MMOL/L (ref 96–112)
CO2 SERPL-SCNC: 22 MMOL/L (ref 20–33)
CREAT SERPL-MCNC: 1.09 MG/DL (ref 0.5–1.4)
EOSINOPHIL # BLD AUTO: 0.24 K/UL (ref 0–0.51)
EOSINOPHIL NFR BLD: 3.4 % (ref 0–6.9)
ERYTHROCYTE [DISTWIDTH] IN BLOOD BY AUTOMATED COUNT: 54.2 FL (ref 35.9–50)
GLUCOSE SERPL-MCNC: 121 MG/DL (ref 65–99)
HCT VFR BLD AUTO: 34.2 % (ref 42–52)
HGB BLD-MCNC: 11.2 G/DL (ref 14–18)
IMM GRANULOCYTES # BLD AUTO: 0.09 K/UL (ref 0–0.11)
IMM GRANULOCYTES NFR BLD AUTO: 1.3 % (ref 0–0.9)
LYMPHOCYTES # BLD AUTO: 0.69 K/UL (ref 1–4.8)
LYMPHOCYTES NFR BLD: 9.7 % (ref 22–41)
MCH RBC QN AUTO: 33.8 PG (ref 27–33)
MCHC RBC AUTO-ENTMCNC: 32.7 G/DL (ref 33.7–35.3)
MCV RBC AUTO: 103.3 FL (ref 81.4–97.8)
MONOCYTES # BLD AUTO: 0.61 K/UL (ref 0–0.85)
MONOCYTES NFR BLD AUTO: 8.6 % (ref 0–13.4)
NEUTROPHILS # BLD AUTO: 5.44 K/UL (ref 1.82–7.42)
NEUTROPHILS NFR BLD: 76.7 % (ref 44–72)
NRBC # BLD AUTO: 0 K/UL
NRBC BLD-RTO: 0 /100 WBC
PLATELET # BLD AUTO: 229 K/UL (ref 164–446)
PMV BLD AUTO: 9.4 FL (ref 9–12.9)
POTASSIUM SERPL-SCNC: 3.7 MMOL/L (ref 3.6–5.5)
RBC # BLD AUTO: 3.31 M/UL (ref 4.7–6.1)
SODIUM SERPL-SCNC: 144 MMOL/L (ref 135–145)
WBC # BLD AUTO: 7.1 K/UL (ref 4.8–10.8)

## 2020-12-29 PROCEDURE — 700111 HCHG RX REV CODE 636 W/ 250 OVERRIDE (IP): Performed by: FAMILY MEDICINE

## 2020-12-29 PROCEDURE — 700102 HCHG RX REV CODE 250 W/ 637 OVERRIDE(OP): Performed by: FAMILY MEDICINE

## 2020-12-29 PROCEDURE — 85025 COMPLETE CBC W/AUTO DIFF WBC: CPT

## 2020-12-29 PROCEDURE — A9270 NON-COVERED ITEM OR SERVICE: HCPCS | Performed by: FAMILY MEDICINE

## 2020-12-29 PROCEDURE — 36415 COLL VENOUS BLD VENIPUNCTURE: CPT

## 2020-12-29 PROCEDURE — 99239 HOSP IP/OBS DSCHRG MGMT >30: CPT | Performed by: INTERNAL MEDICINE

## 2020-12-29 PROCEDURE — 80048 BASIC METABOLIC PNL TOTAL CA: CPT

## 2020-12-29 RX ORDER — AMOXICILLIN AND CLAVULANATE POTASSIUM 875; 125 MG/1; MG/1
1 TABLET, FILM COATED ORAL EVERY 12 HOURS
Qty: 6 TAB | Refills: 0 | Status: SHIPPED | OUTPATIENT
Start: 2020-12-29 | End: 2021-01-01

## 2020-12-29 RX ORDER — GABAPENTIN 100 MG/1
100 CAPSULE ORAL 3 TIMES DAILY
Qty: 90 CAP | Refills: 1 | Status: ON HOLD
Start: 2020-12-29 | End: 2021-07-27

## 2020-12-29 RX ORDER — AMLODIPINE BESYLATE 10 MG/1
10 TABLET ORAL DAILY
Qty: 30 TAB | Refills: 1 | Status: ON HOLD
Start: 2020-12-30 | End: 2021-07-27

## 2020-12-29 RX ADMIN — GABAPENTIN 100 MG: 100 CAPSULE ORAL at 05:29

## 2020-12-29 RX ADMIN — AMLODIPINE BESYLATE 10 MG: 10 TABLET ORAL at 07:43

## 2020-12-29 RX ADMIN — Medication 400 MG: at 05:28

## 2020-12-29 RX ADMIN — AMOXICILLIN AND CLAVULANATE POTASSIUM 1 TABLET: 875; 125 TABLET, FILM COATED ORAL at 05:28

## 2020-12-29 RX ADMIN — FUROSEMIDE 20 MG: 10 INJECTION, SOLUTION INTRAMUSCULAR; INTRAVENOUS at 05:29

## 2020-12-29 RX ADMIN — POTASSIUM CHLORIDE 40 MEQ: 1500 TABLET, EXTENDED RELEASE ORAL at 05:28

## 2020-12-29 NOTE — PROGRESS NOTES
Discharge instructions discussed with patient, no further questions or concerns at this time. IV and armbands removed. This RN hooked patient up to his home wound vac and went over teaching and troubleshooting. IR drain teaching completed. Patient escorted downstairs with all belongings including: Cell phone, clothes, chargers, and all home wound vac supplies.

## 2020-12-29 NOTE — PROGRESS NOTES
Bedside report received.  Assessment complete.  A&O x 4. Patient calls appropriately.  Patient mobilizes independently.   Patient has 0/10 pain. Declines pain or interventions needed at this time.  Denies N&V. Tolerating regular diet.  Surgical site to abdomen with wound vac, IR drain to L buttock.  + void, + flatus, + BM.  Patient denies SOB.  SCD's off, patient mobilizing frequently.  Review plan with of care with patient. Call light and personal belongings with in reach. Hourly rounding in place. All needs met at this time.

## 2020-12-29 NOTE — PROGRESS NOTES
Home wound vac at bedside. Wife and patient educated on use and how to change canisters. Anticipated discharge tomorrow.

## 2020-12-29 NOTE — PROGRESS NOTES
Monitor summary:    SR65-81  Rare PVC  Rare Coup  Rare PAC  16/08/38    Per monitor room strip summary

## 2020-12-29 NOTE — DISCHARGE SUMMARY
Discharge Summary    CHIEF COMPLAINT ON ADMISSION  Chief Complaint   Patient presents with   • Post-Op Complications       Reason for Admission  Post-op complication     Admission Date  12/18/2020    CODE STATUS  Prior    HPI & HOSPITAL COURSE  This is a 70 y.o. male here with above medical issues.  He presented with abdominal wall cellulitis and pelvic fluid collection. He underwent recent ileostomy reversal after getting robotic assisted lap LAR with creation of diverting loop ileostomy. He was placed on empiric IV vancomycin and zosyn and admitted to the surgical floor. Wound vac was placed for abdominal wound. CT guided drain was placed on 12/23/2020 and flexible sigmoidoscopy was peformed which showed wide open patent stapled anastomosis at 10 cm and boggy edematous mucosa and rectum likely related to XRT. Fluid and gram stain from the fluid culture above has remain no growth to date. Drain will remain in place until outpatient surgical follow up. His multiple electrolyte deficiencies were normalized. He will continue augmentin outpatient for duration as outlined below. His outpatient blood pressure medications were adjusted as outlined below. His Laura resolved and there could be consideration of resuming his outpatient losartan in outpatient follow up.  He was set up with home wound vac and will receive home health wound vac care.   No notes on file    Therefore, he is discharged in fair and stable condition to home with close outpatient follow-up.    The patient met 2-midnight criteria for an inpatient stay at the time of discharge.    Discharge Date  12/29/2020    FOLLOW UP ITEMS POST DISCHARGE  F/U with wound care clinic in 1 week  F/U with colorectal surgery in 1 week and possible CHRIS drain removal at that time    DISCHARGE DIAGNOSES  Principal Problem (Resolved):    Abdominal wall cellulitis POA: Yes  Active Problems:    History of reversal of ileostomy POA: Yes    Hypertension POA: Yes    Macrocytic anemia  POA: Yes  Resolved Problems:    Sepsis (HCC) POA: Yes    CRISTHIAN (acute kidney injury) ([facility]) POA: Yes    Pelvic fluid collection POA: Yes    Hypokalemia POA: Yes    Thrombocytopenia (HCC) POA: Yes    Hypophosphatemia POA: Yes    Hypomagnesemia POA: Yes      FOLLOW UP  Future Appointments   Date Time Provider Department Center   12/30/2020  3:00 PM FELISHA Hodges [facility] [address] [place] [id number]  [phone]        Wound Care Center  [address] [facility] [address]  [facility] [address]  [phone]  Go on 12/30/2020  3:00 PM       MEDICATIONS ON DISCHARGE     Medication List      START taking these medications      Instructions   amLODIPine 10 MG Tabs  Start taking on: December 30, 2020  Commonly known as: NORVASC   Take 1 Tab by mouth every day.  Dose: 10 mg     amoxicillin-clavulanate 875-125 MG Tabs  Commonly known as: AUGMENTIN   Take 1 Tab by mouth every 12 hours for 3 days.  Dose: 1 Tab        CHANGE how you take these medications      Instructions   gabapentin 100 MG Caps  What changed:   · how much to take  · when to take this  Commonly known as: NEURONTIN   Take 1 Cap by mouth 3 times a day.  Dose: 100 mg        CONTINUE taking these medications      Instructions   Vistaril 25 MG Caps  Generic drug: hydrOXYzine pamoate   Take 25 mg by mouth every evening.  Dose: 25 mg        STOP taking these medications    losartan 100 MG Tabs  Commonly known as: COZAAR     metroNIDAZOLE 500 MG Tabs  Commonly known as: FLAGYL     neomycin 500 MG Tabs  Commonly known as: MYCIFRADIN            Allergies  No Known Allergies    DIET  regular    ACTIVITY  As tolerated.  Weight bearing as tolerated    CONSULTATIONS  IR, surgery    PROCEDURES  As noted above    CT-DRAIN-RETROPERITONEAL   Final Result      Successful presacral pelvic drainage tube placement.      Plan: Twice daily flushes with 10 mL of sterile saline. Monitor outputs. Please contact interventional  radiology if there is any concern for tube dysfunction.         DX-CHEST-PORTABLE (1 VIEW)   Final Result      Hypoinflation with bibasilar opacities, atelectasis and/or pneumonitis.      CT-ABDOMEN-PELVIS WITH   Final Result         1.  Presacral mildly enhancing fluid collection, appearance suggests abscess.   2.  Large quantity of stool in the cecum favors changes of constipation. Component of evolving Natalia's or ileus not excluded. Recommend radiographic follow-up to resolution   3.  Bladder wall thickening, consider changes of cystitis, correlate with urinalysis   4.  Bilateral fat-containing inguinal hernias   5.  Small layering left pleural effusion   6.  Fat-containing umbilical hernia   7.  Atherosclerosis and atherosclerotic coronary artery disease.            LABORATORY  Lab Results   Component Value Date    SODIUM 144 12/29/2020    POTASSIUM 3.7 12/29/2020    CHLORIDE 111 12/29/2020    CO2 22 12/29/2020    GLUCOSE 121 (H) 12/29/2020    BUN 9 12/29/2020    CREATININE 1.09 12/29/2020        Lab Results   Component Value Date    WBC 7.1 12/29/2020    HEMOGLOBIN 11.2 (L) 12/29/2020    HEMATOCRIT 34.2 (L) 12/29/2020    PLATELETCT 229 12/29/2020        Total time of the discharge process exceeds 43 minutes.

## 2020-12-30 ENCOUNTER — OFFICE VISIT (OUTPATIENT)
Dept: WOUND CARE | Facility: MEDICAL CENTER | Age: 70
End: 2020-12-30
Attending: FAMILY MEDICINE
Payer: MEDICARE

## 2020-12-30 VITALS
OXYGEN SATURATION: 94 % | HEART RATE: 83 BPM | TEMPERATURE: 98.6 F | DIASTOLIC BLOOD PRESSURE: 78 MMHG | SYSTOLIC BLOOD PRESSURE: 141 MMHG | RESPIRATION RATE: 20 BRPM

## 2020-12-30 DIAGNOSIS — T14.8XXA PAIN ASSOCIATED WITH WOUND: ICD-10-CM

## 2020-12-30 DIAGNOSIS — R52 PAIN ASSOCIATED WITH WOUND: ICD-10-CM

## 2020-12-30 DIAGNOSIS — S31.109A OPEN WOUND OF ABDOMINAL WALL, INITIAL ENCOUNTER: Primary | ICD-10-CM

## 2020-12-30 DIAGNOSIS — Z85.048 HISTORY OF COLORECTAL CANCER: ICD-10-CM

## 2020-12-30 PROCEDURE — 99214 OFFICE O/P EST MOD 30 MIN: CPT

## 2020-12-30 PROCEDURE — 11045 DBRDMT SUBQ TISS EACH ADDL: CPT

## 2020-12-30 PROCEDURE — 11045 DBRDMT SUBQ TISS EACH ADDL: CPT | Performed by: NURSE PRACTITIONER

## 2020-12-30 PROCEDURE — 99213 OFFICE O/P EST LOW 20 MIN: CPT | Mod: 25 | Performed by: NURSE PRACTITIONER

## 2020-12-30 PROCEDURE — 11042 DBRDMT SUBQ TIS 1ST 20SQCM/<: CPT

## 2020-12-30 PROCEDURE — 11042 DBRDMT SUBQ TIS 1ST 20SQCM/<: CPT | Performed by: NURSE PRACTITIONER

## 2020-12-30 PROCEDURE — 97605 NEG PRS WND THER DME<=50SQCM: CPT

## 2020-12-30 ASSESSMENT — ENCOUNTER SYMPTOMS
CHILLS: 0
COUGH: 0
NERVOUS/ANXIOUS: 0
CONSTIPATION: 0
NAUSEA: 0
DIARRHEA: 1
DEPRESSION: 0
SHORTNESS OF BREATH: 0
VOMITING: 0
FEVER: 0

## 2020-12-30 NOTE — PATIENT INSTRUCTIONS
-Keep your wound dressing clean, dry, and intact.    -Wound vac may not have any drainage in tube or cannister & it will still be working.   Change cannister if it does become full by pressing tab on side of machine to remove canister and snap on new one. Full canister can be thrown in the trash. If cannister fills with bright red blood - go to ER. Dressing will be changed every MWF at the wound clini.  If you are having issues with your wound VAC, please consider patching leaks, changing the canister, or calling [phone] for troubleshooting. If the wound VAC has been off or un-operational for over 2 hours, call wound care center to inform them and remove all dressings including black foam and replace with normal saline damp gauze.     -Should you experience any significant changes in your wound(s), such as infection (redness, swelling, localized heat, increased pain, fever > 101 F, chills) or have any questions regarding your home care instructions, please contact the wound center at [phone]. If after hours, contact your primary care physician or go to the hospital emergency room.

## 2020-12-31 NOTE — PROGRESS NOTES
Provider Encounter- Full Thickness wound    HISTORY OF PRESENT ILLNESS  Wound History:    START OF CARE IN CLINIC: 12/30/2020    REFERRING PROVIDER: Ángel Keenan M.D.     WOUND- Full Thickness Wound   LOCATION: Abdominal right lower quadrant   HISTORY: 70-year-old male with history of low anterior resection and loop ileostomy creation for treatment of colorectal cancer.  After completing chemo and radiation therapy, patient opted to have his ostomy reversed.  He underwent an ileostomy takedown on 12/15/2020 by Dr. Casey Aquino, and was discharged the following day.  3 days later, he presented to the emergency room with abdominal distention and pain.  Surgical site had apparently dehisced, VAC was placed.  CT revealed abdominal wall cellulitis, cecal constipation and presacral abscess.  A flexible sigmoidoscopy was done to check anastomosis, no evidence of stricture.  A CT-guided drain was placed on 12/23.  He was discharged home on 12/29 on p.o. Augmentin and Flagyl, referred to home health and [facility] for management of his wound.    Pertinent Medical History: Colorectal cancer status post chemoradiation therapy, hypertension    TOBACCO USE: He has never smoked or used smokeless tobacco    Patient's problem list, allergies, and current medications reviewed and updated in Epic    Interval History:  12/30/2020: Initial clinic visit with SNOW Donato.  Patient is accompanied by his wife today who provides much of wound history.  Patient states he is feeling well overall, denies fevers, chills, nausea, vomiting, cough or shortness of breath.  Home health is scheduled to come to his home tomorrow for initial evaluation.  Wife has multiple concerns, worried that home health will not be able to manage this wound appropriately.  She was assured that thorough instructions would be sent to home health agency.  I also arranged for [facility] rep, Belinda Connell RN, CWOCN, to be present for VAC dressing change on  Monday.      REVIEW OF SYSTEMS:   Review of Systems   Constitutional: Negative for chills and fever.   Respiratory: Negative for cough and shortness of breath.    Cardiovascular: Positive for leg swelling. Negative for chest pain.        Swelling in both legs since surgery, improving   Gastrointestinal: Positive for diarrhea. Negative for constipation, nausea and vomiting.        Loose stools since ileostomy takedown, beginning to firm   Genitourinary: Negative for dysuria.   Musculoskeletal: Negative for joint pain.   Skin: Negative for rash.   Psychiatric/Behavioral: Negative for depression. The patient is not nervous/anxious.        PHYSICAL EXAMINATION:   /78   Pulse 83   Temp 37 °C (98.6 °F)   Resp 20   SpO2 94%     Physical Exam   Constitutional: He is oriented to person, place, and time and well-developed, well-nourished, and in no distress.   HENT:   Head: Normocephalic.   Eyes: Pupils are equal, round, and reactive to light.   Pulmonary/Chest: Effort normal.   Abdominal: He exhibits distension. There is no abdominal tenderness.   Abdomen slightly distended and firm   Musculoskeletal: Normal range of motion.         General: Edema present.      Comments: Nonpitting edema bilateral lower extremities   Neurological: He is alert and oriented to person, place, and time.   Skin: Skin is warm.   Full-thickness wound to right lower quadrant, tracts x2  Refer to wound flowsheet and photos   Psychiatric: Mood, memory, affect and judgment normal.       WOUND ASSESSMENT       Wound 12/30/20 RLQ abdomen (Active)   Wound Image    12/30/20 1500   Site Assessment Red;Yellow 12/30/20 1500   Periwound Assessment Dry;Blistered;Pink 12/30/20 1500   Margins Attached edges;Unattached edges 12/30/20 1500   Drainage Amount Large 12/30/20 1500   Drainage Description Serosanguineous 12/30/20 1500   Treatments Cleansed;Topical Lidocaine;Provider debridement;Site care 12/30/20 1500   Wound Cleansing Puracyn Spray 12/30/20  1500   Periwound Protectant Skin Protectant Wipes to Periwound;Benzoin;Hydrocolloid;Drape 20   Dressing Cleansing/Solutions Not Applicable 20   Dressing Options Collagen Dressing;Wound Vac 20   Dressing Changed New 20 1500   Dressing Status Clean;Dry;Intact 20   Non-staged Wound Description Full thickness 20   Wound Length (cm) 3.7 cm 20 1500   Wound Width (cm) 6 cm 20 1500   Wound Surface Area (cm^2) 22.2 cm^2 20   Post-Procedure Length (cm) 3.7 cm 20 1500   Post-Procedure Width (cm) 6 cm 20 1500   Post-Procedure Surface Area (cm^2) 22.2 cm^2 20 1500   Tunneling Clock Position of Wound 3 20 1500   Undermining (cm) 0 cm 20   Wound Odor None 20 1500   Exposed Structures None 20 1500        PROCEDURE:   -2% viscous lidocaine applied topically to wound bed for approximately 5 minutes prior to debridement  -Curette used to debride wound bed.  Excisional debridement was performed to remove devitalized tissue until healthy, bleeding tissue was visualized.   Entire surface of wound, 22.2 cm2 debrided.  Tissue debrided into the subcutaneous layer.    -Bleeding controlled with manual pressure.    -Wound care completed by wound RN, refer to flowsheet  -Patient tolerated the procedure well, without c/o pain or discomfort.       Pertinent Labs and Diagnostics:    Labs:     A1c: No results found for: HBA1C       IMAGIN2020-CT of abdomen and pelvis with contrast  IMPRESSION:        1.  Presacral mildly enhancing fluid collection, appearance suggests abscess.  2.  Large quantity of stool in the cecum favors changes of constipation. Component of evolving Natalia's or ileus not excluded. Recommend radiographic follow-up to resolution  3.  Bladder wall thickening, consider changes of cystitis, correlate with urinalysis  4.  Bilateral fat-containing inguinal hernias  5.  Small layering left  pleural effusion  6.  Fat-containing umbilical hernia  7.  Atherosclerosis and atherosclerotic coronary artery disease.      VASCULAR STUDIES: N/A    LAST  WOUND CULTURE:  DATE : None found in epic             ASSESSMENT AND PLAN:     1. Open wound of abdominal wall, initial encounter  Comments: Dehiscence of surgical wound, status post ileostomy takedown.  Patient developed abscess postoperatively.    12/30/2020: Initial clinic visit.  Deep tracts x2 noted.  -Excisional debridement of wound in clinic today, medically necessary to promote wound healing.  -Patient to return to clinic weekly for assessment and debridement  -Home health to change dressing 2 times per week in between clinic visits.  Very detailed instructions sent to home health agency.  White foam to be used in 2 tracts, number of pieces of foam used to be written on VAC drape with each dressing change.  -I rep, Belinda Connell RN, CWOCN, will be present for VAC dressing change on 1/4 to provide assistance and instruction    Wound care: NPWT at 125 mmHg to accelerate granulation, white foam into tract to minimize risk of retention, change 3 times per week.        2. Pain associated with wound    12/30/2020:  -2% viscous lidocaine applied topically to wound bed for approximately 5 minutes prior to debridement  -Patient tolerated procedure today with no complaints of discomfort.    3. History of colorectal cancer  Comments: Status post chemoradiation therapy, LAR, and ileostomy reversal      PATIENT EDUCATION  - Importance of adequate nutrition for wound healing  -Advised to go to ER for any increased redness, swelling, drainage, or odor, or if patient develops fever, chills, nausea or vomiting.     25 min spent face to face with patient, >50% of time spent counseling, coordinating care, reviewing records, discussing POC, educating patient regarding wound healing and progression.  This time was spent in excess to procedure time.       Please note that  this note may have been created using voice recognition software. I have worked with technical experts from [facility] to optimize the interface.  I have made every reasonable attempt to correct obvious errors, but there may be errors of grammar and possibly content that I did not discover before finalizing the note.    N

## 2021-01-04 NOTE — DOCUMENTATION QUERY
[facility]                                                                       Query Response Note      PATIENT:               DIVYA BEE  ACCT #:                  [id number]  MRN:                     [id number]  :                      1950  ADMIT DATE:       2020 8:07 PM  DISCH DATE:        2020 10:30 AM  RESPONDING  PROVIDER #:        [id number]           QUERY TEXT:    Please clarify the term ?post operative? related to the documented condition in the Medical Record.      NOTE:  If an appropriate response is not listed below, please respond with a new note.    Documentation indicators that raised basis for question:   H&P: Abdominal wall cellulitis. History of hemicolectomy, postop day 3 ostomy reversal.   Surgery Consult: Postoperative cellulitis and distention. Cellulitis of surgical incision.   Attending Note: Cellulitis / abscess at prior ostomy site concerning for Surgical Site Infection.   Surgery Note: Drainage of abscess cavity  Treatment: IV antibiotics; surgery consult; wound team consult; drainage of abscess; lab/imaging  Risk Factors: Hx colon cancer w/ hemicolectomy; ostomy reversal 3 days ago    Thank You,  Zohra King RN  Clinical    Connect via [employer]  Options provided:   -- Cellulitis / abscess is unexpected and a complication of the procedure performed   -- Cellulitis / abscess is not a complication due to or associated with the procedure   -- Cellulitis / abscess is routinely expected and integral/inherent to the procedure performed   -- Cellulitis / abscess is related to another etiology, (please document underlying etiology)   -- Unable to determine      Query created by: Zohra King on 2020 6:59 AM    RESPONSE TEXT:    Cellulitis / abscess is unexpected and a complication of the procedure performed          Electronically signed by:   DIVYA PRITCHARD MD 1/4/2021 1:15 PM

## 2021-01-08 ENCOUNTER — OFFICE VISIT (OUTPATIENT)
Dept: WOUND CARE | Facility: MEDICAL CENTER | Age: 71
End: 2021-01-08
Attending: FAMILY MEDICINE
Payer: MEDICARE

## 2021-01-08 DIAGNOSIS — R52 PAIN ASSOCIATED WITH WOUND: ICD-10-CM

## 2021-01-08 DIAGNOSIS — Z85.048 HISTORY OF COLORECTAL CANCER: ICD-10-CM

## 2021-01-08 DIAGNOSIS — T14.8XXA PAIN ASSOCIATED WITH WOUND: ICD-10-CM

## 2021-01-08 DIAGNOSIS — S31.109A OPEN WOUND OF ABDOMINAL WALL, INITIAL ENCOUNTER: ICD-10-CM

## 2021-01-08 PROCEDURE — 97605 NEG PRS WND THER DME<=50SQCM: CPT

## 2021-01-08 PROCEDURE — 11042 DBRDMT SUBQ TIS 1ST 20SQCM/<: CPT | Performed by: NURSE PRACTITIONER

## 2021-01-08 PROCEDURE — 11042 DBRDMT SUBQ TIS 1ST 20SQCM/<: CPT

## 2021-01-08 ASSESSMENT — ENCOUNTER SYMPTOMS
DIARRHEA: 1
COUGH: 0
SHORTNESS OF BREATH: 0
CONSTIPATION: 0
NAUSEA: 0
VOMITING: 0
CHILLS: 0
NERVOUS/ANXIOUS: 0
DEPRESSION: 0
FEVER: 0

## 2021-01-08 NOTE — PROGRESS NOTES
Provider Encounter- Full Thickness wound    HISTORY OF PRESENT ILLNESS  Wound History:    START OF CARE IN CLINIC: 12/30/2020    REFERRING PROVIDER: Ángel Keenan M.D.     WOUND- Full Thickness Wound   LOCATION: Abdominal right lower quadrant   HISTORY: 70-year-old male with history of low anterior resection and loop ileostomy creation for treatment of colorectal cancer.  After completing chemo and radiation therapy, patient opted to have his ostomy reversed.  He underwent an ileostomy takedown on 12/15/2020 by Dr. Casey Aquino, and was discharged the following day.  3 days later, he presented to the emergency room with abdominal distention and pain.  Surgical site had apparently dehisced, VAC was placed.  CT revealed abdominal wall cellulitis, cecal constipation and presacral abscess.  A flexible sigmoidoscopy was done to check anastomosis, no evidence of stricture.  A CT-guided drain was placed on 12/23.  He was discharged home on 12/29 on p.o. Augmentin and Flagyl, referred to home health and [facility] for management of his wound.    Pertinent Medical History: Colorectal cancer status post chemoradiation therapy, hypertension    TOBACCO USE: He has never smoked or used smokeless tobacco    Patient's problem list, allergies, and current medications reviewed and updated in Epic    Interval History:  12/30/2020: Initial clinic visit with SNOW Donato.  Patient is accompanied by his wife today who provides much of wound history.  Patient states he is feeling well overall, denies fevers, chills, nausea, vomiting, cough or shortness of breath.  Home health is scheduled to come to his home tomorrow for initial evaluation.  Wife has multiple concerns, worried that home health will not be able to manage this wound appropriately.  She was assured that thorough instructions would be sent to home health agency.  I also arranged for [facility] rep, Belinda Connell RN, CWOCN, to be present for VAC dressing change on  Monday.      1/8/2021 : Clinic visit with Bev Lu, SNOW, FNP-BC, CWOCN, CFCN.  Casey is again accompanied by his wife today.  He states he is feeling well,denies fevers, chills, nausea, vomiting, cough or shortness of breath.  He is eating well and bowel movements are returning to normal.  Home health has been changing his VAC, they are satisfied with wound progress.  The tract, noticed last clinic visit, has decreased significantly.    REVIEW OF SYSTEMS:   Review of Systems   Constitutional: Negative for chills and fever.   Respiratory: Negative for cough and shortness of breath.    Cardiovascular: Positive for leg swelling. Negative for chest pain.        Swelling in both legs since surgery, improving   Gastrointestinal: Positive for diarrhea. Negative for constipation, nausea and vomiting.        Loose stools since ileostomy takedown, beginning to firm   Genitourinary: Negative for dysuria.   Musculoskeletal: Negative for joint pain.   Skin: Negative for rash.   Psychiatric/Behavioral: Negative for depression. The patient is not nervous/anxious.        PHYSICAL EXAMINATION:   There were no vitals taken for this visit.    Physical Exam   Constitutional: He is oriented to person, place, and time and well-developed, well-nourished, and in no distress.   HENT:   Head: Normocephalic.   Eyes: Pupils are equal, round, and reactive to light.   Pulmonary/Chest: Effort normal.   Abdominal: He exhibits distension. There is no abdominal tenderness.   Abdomen slightly distended and firm   Musculoskeletal: Normal range of motion.         General: Edema present.      Comments: Nonpitting edema bilateral lower extremities   Neurological: He is alert and oriented to person, place, and time.   Skin: Skin is warm.   Full-thickness wound to right lower quadrant, tracts x2  Refer to wound flowsheet and photos   Psychiatric: Mood, memory, affect and judgment normal.       WOUND ASSESSMENT       Wound 12/30/20 RLQ abdomen  (Active)   Wound Image   01/08/21 1035   Site Assessment Red;Yellow 01/08/21 1035   Periwound Assessment Dry;Blistered;Pink 01/08/21 1035   Margins Attached edges;Unattached edges 01/08/21 1035   Drainage Amount Large 01/08/21 1035   Drainage Description Serosanguineous 01/08/21 1035   Treatments Cleansed;Topical Lidocaine;Provider debridement 01/08/21 1035   Wound Cleansing Foam Cleanser/Washcloth 01/08/21 1035   Periwound Protectant Skin Protectant Wipes to Periwound;Hydrocolloid;Drape 01/08/21 1035   Dressing Cleansing/Solutions Other (Comments) 01/08/21 1035   Dressing Options Collagen Dressing;Wound Vac 01/08/21 1035   Dressing Changed New 01/08/21 1035   Dressing Status Clean;Dry;Intact 01/08/21 1035   Non-staged Wound Description Full thickness 01/08/21 1035   Wound Length (cm) 3.7 cm 12/30/20 1500   Wound Width (cm) 6 cm 12/30/20 1500   Wound Surface Area (cm^2) 22.2 cm^2 12/30/20 1500   Post-Procedure Length (cm) 3 cm 01/08/21 1035   Post-Procedure Width (cm) 4.6 cm 01/08/21 1035   Post-Procedure Depth (cm) 3.2 cm 01/08/21 1035   Post-Procedure Surface Area (cm^2) 13.8 cm^2 01/08/21 1035   Post-Procedure Volume (cm^3) 44.16 cm^3 01/08/21 1035   Tunneling (cm) 3.2 cm 01/08/21 1035   Tunneling Clock Position of Wound 3 01/08/21 1035   Undermining (cm) 0 cm 01/08/21 1035   Wound Odor None 01/08/21 1035   Exposed Structures None 01/08/21 1035           PROCEDURE:   -2% viscous lidocaine applied topically to wound bed for approximately 5 minutes prior to debridement  -Curette used to debride wound bed.  Excisional debridement was performed to remove devitalized tissue until healthy, bleeding tissue was visualized.   Entire surface of wound, 13.8 cm2 debrided.  Tissue debrided into the subcutaneous layer.    -Bleeding controlled with manual pressure.    -Wound care completed by wound RN, refer to flowsheet  -Patient tolerated the procedure well, without c/o pain or discomfort.       Pertinent Labs and  Diagnostics:    Labs:     A1c: No results found for: HBA1C       IMAGIN2020-CT of abdomen and pelvis with contrast  IMPRESSION:        1.  Presacral mildly enhancing fluid collection, appearance suggests abscess.  2.  Large quantity of stool in the cecum favors changes of constipation. Component of evolving Natalia's or ileus not excluded. Recommend radiographic follow-up to resolution  3.  Bladder wall thickening, consider changes of cystitis, correlate with urinalysis  4.  Bilateral fat-containing inguinal hernias  5.  Small layering left pleural effusion  6.  Fat-containing umbilical hernia  7.  Atherosclerosis and atherosclerotic coronary artery disease.      VASCULAR STUDIES: N/A    LAST  WOUND CULTURE:  DATE : None found in epic             ASSESSMENT AND PLAN:     1. Open wound of abdominal wall, initial encounter  Comments: Dehiscence of surgical wound, status post ileostomy takedown.  Patient developed abscess postoperatively.    2021: Area of wound, and depth of tracts have decreased significantly since last assessment.  Home health is seeing him in between clinic visits for VAC dressing changes.  -Excisional debridement of wound in clinic today, medically necessary to promote wound healing.  -Patient to return to clinic weekly for assessment and debridement  -Home health to change dressing 2 times per week in between clinic visits.  Very detailed instructions sent to home health agency.  Discontinue white foam into tract,  -Patient's wife was presented with phone number for [facility] rep, Belinda Connell, advised to call if she has any concerns regarding VAC.    Wound care: NPWT at 125 mmHg to accelerate granulation, white foam into tract to minimize risk of retention, change 3 times per week.        2. Pain associated with wound    2021:  -2% viscous lidocaine applied topically to wound bed for approximately 5 minutes prior to debridement  -Patient tolerated procedure today with no complaints  of discomfort.    3. History of colorectal cancer  Comments: Status post chemoradiation therapy, LAR, and ileostomy reversal      PATIENT EDUCATION  - Importance of adequate nutrition for wound healing  -Advised to go to ER for any increased redness, swelling, drainage, or odor, or if patient develops fever, chills, nausea or vomiting.           Please note that this note may have been created using voice recognition software. I have worked with technical experts from [facility] to optimize the interface.  I have made every reasonable attempt to correct obvious errors, but there may be errors of grammar and possibly content that I did not discover before finalizing the note.    N

## 2021-01-15 ENCOUNTER — OFFICE VISIT (OUTPATIENT)
Dept: WOUND CARE | Facility: MEDICAL CENTER | Age: 71
End: 2021-01-15
Attending: FAMILY MEDICINE
Payer: MEDICARE

## 2021-01-15 VITALS
TEMPERATURE: 97.6 F | SYSTOLIC BLOOD PRESSURE: 126 MMHG | RESPIRATION RATE: 18 BRPM | OXYGEN SATURATION: 92 % | HEART RATE: 94 BPM | DIASTOLIC BLOOD PRESSURE: 65 MMHG

## 2021-01-15 DIAGNOSIS — S31.109A OPEN WOUND OF ABDOMINAL WALL, INITIAL ENCOUNTER: ICD-10-CM

## 2021-01-15 DIAGNOSIS — R52 PAIN ASSOCIATED WITH WOUND: ICD-10-CM

## 2021-01-15 DIAGNOSIS — S31.109A OPEN WOUND OF ABDOMEN, INITIAL ENCOUNTER: ICD-10-CM

## 2021-01-15 DIAGNOSIS — Z85.048 HISTORY OF COLORECTAL CANCER: ICD-10-CM

## 2021-01-15 DIAGNOSIS — Z98.890 HISTORY OF REVERSAL OF ILEOSTOMY: ICD-10-CM

## 2021-01-15 DIAGNOSIS — T14.8XXA PAIN ASSOCIATED WITH WOUND: ICD-10-CM

## 2021-01-15 PROCEDURE — 11042 DBRDMT SUBQ TIS 1ST 20SQCM/<: CPT | Performed by: NURSE PRACTITIONER

## 2021-01-15 PROCEDURE — 11042 DBRDMT SUBQ TIS 1ST 20SQCM/<: CPT

## 2021-01-15 ASSESSMENT — ENCOUNTER SYMPTOMS
NAUSEA: 0
SHORTNESS OF BREATH: 0
CHILLS: 0
COUGH: 0
NERVOUS/ANXIOUS: 0
CONSTIPATION: 0
FEVER: 0
DEPRESSION: 0
VOMITING: 0
DIARRHEA: 1

## 2021-01-15 NOTE — PATIENT INSTRUCTIONS
-Keep dressings clean and dry. Change dressings if they become over saturated, soiled or fall off.     -Should you experience any significant changes in your wound(s), such as infection (redness, swelling, localized heat, increased pain, fever > 101 F, chills) or have any questions regarding your home care instructions, please contact the wound center at [phone]. If after hours, contact your primary care physician or go to the hospital emergency room.

## 2021-01-15 NOTE — PROGRESS NOTES
Provider Encounter- Full Thickness wound    HISTORY OF PRESENT ILLNESS  Wound History:    START OF CARE IN CLINIC: 12/30/2020    REFERRING PROVIDER: Ángel Keenan M.D.     WOUND- Full Thickness Wound   LOCATION: Abdominal right lower quadrant   HISTORY: 70-year-old male with history of low anterior resection and loop ileostomy creation for treatment of colorectal cancer.  After completing chemo and radiation therapy, patient opted to have his ostomy reversed.  He underwent an ileostomy takedown on 12/15/2020 by Dr. Casey Aquino, and was discharged the following day.  3 days later, he presented to the emergency room with abdominal distention and pain.  Surgical site had apparently dehisced, VAC was placed.  CT revealed abdominal wall cellulitis, cecal constipation and presacral abscess.  A flexible sigmoidoscopy was done to check anastomosis, no evidence of stricture.  A CT-guided drain was placed on 12/23.  He was discharged home on 12/29 on p.o. Augmentin and Flagyl, referred to home health and [facility] for management of his wound.    Pertinent Medical History: Colorectal cancer status post chemoradiation therapy, hypertension    TOBACCO USE: He has never smoked or used smokeless tobacco    Patient's problem list, allergies, and current medications reviewed and updated in Epic    Interval History:  12/30/2020: Initial clinic visit with SNOW Donato.  Patient is accompanied by his wife today who provides much of wound history.  Patient states he is feeling well overall, denies fevers, chills, nausea, vomiting, cough or shortness of breath.  Home health is scheduled to come to his home tomorrow for initial evaluation.  Wife has multiple concerns, worried that home health will not be able to manage this wound appropriately.  She was assured that thorough instructions would be sent to home health agency.  I also arranged for [facility] rep, Belinda Connell RN, CWOCN, to be present for VAC dressing change on  Monday.      1/8/2021 : Clinic visit with NSOW Donato, ETHAN, RAMONA, CFCHRISTIAN.  Casey is again accompanied by his wife today.  He states he is feeling well,denies fevers, chills, nausea, vomiting, cough or shortness of breath.  He is eating well and bowel movements are returning to normal.  Home health has been changing his VAC, they are satisfied with wound progress.  The tract, noticed last clinic visit, has decreased significantly.    1/15/2021 : Clinic visit with SNOW Donato, ETHAN, RAMONA, CFCHRISTIAN.  Hai presents to clinic today, again accompanied by his wife.  He states he is feeling well,denies fevers, chills, nausea, vomiting, cough or shortness of breath.  It is noted that there is fluid collected around the wound, under VAC drape.  Patient denies any alarm from VAC.  Home health has been changing his dressing in between clinic visits.    REVIEW OF SYSTEMS:   Review of Systems   Constitutional: Negative for chills and fever.   Respiratory: Negative for cough and shortness of breath.    Cardiovascular: Positive for leg swelling. Negative for chest pain.        Swelling in both legs since surgery, improving   Gastrointestinal: Positive for diarrhea. Negative for constipation, nausea and vomiting.        Loose stools since ileostomy takedown, beginning to firm   Genitourinary: Negative for dysuria.   Musculoskeletal: Negative for joint pain.   Skin: Negative for rash.   Psychiatric/Behavioral: Negative for depression. The patient is not nervous/anxious.        PHYSICAL EXAMINATION:   /65   Pulse 94   Temp 36.4 °C (97.6 °F) (Temporal)   Resp 18   SpO2 92%     Physical Exam   Constitutional: He is oriented to person, place, and time and well-developed, well-nourished, and in no distress.   HENT:   Head: Normocephalic.   Eyes: Pupils are equal, round, and reactive to light.   Pulmonary/Chest: Effort normal.   Abdominal: He exhibits distension. There is no abdominal tenderness.   Abdomen  slightly distended and firm   Musculoskeletal: Normal range of motion.         General: Edema present.      Comments: Nonpitting edema bilateral lower extremities   Neurological: He is alert and oriented to person, place, and time.   Skin: Skin is warm.   Full-thickness wound to right lower quadrant, tracts x2  Refer to wound flowsheet and photos   Psychiatric: Mood, memory, affect and judgment normal.       WOUND ASSESSMENT       Wound 12/30/20 RLQ abdomen (Active)   Wound Image    01/15/21 0940   Site Assessment Pink;Yellow 01/15/21 0940   Periwound Assessment Blanchable erythema;Rash 01/15/21 0940   Margins Attached edges 01/15/21 0940   Drainage Amount Large 01/15/21 0940   Drainage Description Serosanguineous 01/15/21 0940   Treatments Cleansed;Topical Lidocaine;Provider debridement 01/15/21 0940   Wound Cleansing Normal Saline Irrigation 01/15/21 0940   Periwound Protectant Skin Protectant Wipes to Periwound;Barrier Paste 01/15/21 0940   Dressing Cleansing/Solutions Normal Saline 01/15/21 0940   Dressing Options Collagen Dressing;Hydrofiber Silver;Silicone Adhesive Foam 01/15/21 0940   Dressing Changed New 01/15/21 0940   Dressing Status Clean;Dry;Intact 01/08/21 1035   Non-staged Wound Description Full thickness 01/15/21 0940   Wound Length (cm) 2.2 cm 01/15/21 0940   Wound Width (cm) 3.9 cm 01/15/21 0940   Wound Depth (cm) 1.2 cm 01/15/21 0940   Wound Surface Area (cm^2) 8.58 cm^2 01/15/21 0940   Wound Volume (cm^3) 10.3 cm^3 01/15/21 0940   Post-Procedure Length (cm) 2.2 cm 01/15/21 0940   Post-Procedure Width (cm) 4 cm 01/15/21 0940   Post-Procedure Depth (cm) 1.2 cm 01/15/21 0940   Post-Procedure Surface Area (cm^2) 8.8 cm^2 01/15/21 0940   Post-Procedure Volume (cm^3) 10.56 cm^3 01/15/21 0940   Tunneling (cm) 3.2 cm 01/15/21 0940   Tunneling Clock Position of Wound 3 01/08/21 1035   Undermining (cm) 0 cm 01/15/21 0940   Wound Odor None 01/15/21 0940   Exposed Structures None 01/15/21 0940               PROCEDURE:   -2% viscous lidocaine applied topically to wound bed for approximately 5 minutes prior to debridement  -Curette used to debride wound bed.  Excisional debridement was performed to remove devitalized tissue until healthy, bleeding tissue was visualized.   Entire surface of wound, 8.8 cm2 debrided.  Tissue debrided into the subcutaneous layer.    -Bleeding controlled with manual pressure.    -Wound care completed by wound RN, refer to flowsheet  -Patient tolerated the procedure well, without c/o pain or discomfort.       Pertinent Labs and Diagnostics:    Labs:     A1c: No results found for: HBA1C       IMAGIN2020-CT of abdomen and pelvis with contrast  IMPRESSION:        1.  Presacral mildly enhancing fluid collection, appearance suggests abscess.  2.  Large quantity of stool in the cecum favors changes of constipation. Component of evolving [place]'s or ileus not excluded. Recommend radiographic follow-up to resolution  3.  Bladder wall thickening, consider changes of cystitis, correlate with urinalysis  4.  Bilateral fat-containing inguinal hernias  5.  Small layering left pleural effusion  6.  Fat-containing umbilical hernia  7.  Atherosclerosis and atherosclerotic coronary artery disease.      VASCULAR STUDIES: N/A    LAST  WOUND CULTURE:  DATE : None found in epic             ASSESSMENT AND PLAN:     1. Open wound of abdominal wall, initial encounter  Comments: Dehiscence of surgical wound, status post ileostomy takedown.  Patient developed abscess postoperatively.    1/15/2021: Wound area continues to decrease steadily.  However, periwound irritation noted.  It appears as though there was a bit of a leak that was not enough to set off alarm, resulting in fluid sitting on skin.  -Excisional debridement of wound in clinic today, medically necessary to promote wound healing.  -Patient to return to clinic weekly for assessment and debridement  -VAC on hold, collagen and Hydrofiber applied  today.  -Home health to change dressing 2 times per week in between clinic visits.  Very detailed instructions sent to home health agency.    -We will consider restarting VAC next clinic visit if periwound skin has resolved.  However, if wound continues to improve without NPWT, will discontinue VAC altogether.    Wound care: Collagen to accelerate granulation, Hydrofiber to manage exudate, foam cover dressing, Hypafix tape        2. Pain associated with wound    1/15/2021:  -2% viscous lidocaine applied topically to wound bed for approximately 5 minutes prior to debridement  -Patient tolerated procedure today with no complaints of discomfort.    3. History of colorectal cancer  Comments: Status post chemoradiation therapy, LAR, and ileostomy reversal      PATIENT EDUCATION  - Importance of adequate nutrition for wound healing  -Advised to go to ER for any increased redness, swelling, drainage, or odor, or if patient develops fever, chills, nausea or vomiting.           Please note that this note may have been created using voice recognition software. I have worked with technical experts from [facility] [employer] to optimize the interface.  I have made every reasonable attempt to correct obvious errors, but there may be errors of grammar and possibly content that I did not discover before finalizing the note.    N

## 2021-01-20 NOTE — DOCUMENTATION QUERY
[facility]                                                                       Query Response Note      PATIENT:               DIVYA BEE  ACCT #:                  [id number]  MRN:                     [id number]  :                      1950  ADMIT DATE:       2020 8:07 PM  DISCH DATE:        2020 10:30 AM  RESPONDING  PROVIDER #:        [id number]           QUERY TEXT:    It is unclear whether sepsis was present on admission.  Your help is needed.  Please clarify the POA status.      Doris [email or web address]    The patient's Clinical Indicators include:  Patient admitted with abdominal wall cellulitis at surgical site following recent ileostomy reversal.   Patient was also noted to be septic during admission. Sepsis first documented on , but noted as present on admit.  H&P: pulse 96, resp 22  Zosyn  Options provided:   -- Sepsis was present on admission   -- Sepsis was not present on admission   -- Unable to determine      Query created by: Doris Cote on 2021 7:10 AM    RESPONSE TEXT:    Sepsis was present on admission          Electronically signed by:  SHEYLA DONATO MD 2021 10:02 PM

## 2021-01-22 ENCOUNTER — OFFICE VISIT (OUTPATIENT)
Dept: WOUND CARE | Facility: MEDICAL CENTER | Age: 71
End: 2021-01-22
Attending: FAMILY MEDICINE
Payer: MEDICARE

## 2021-01-22 VITALS
RESPIRATION RATE: 20 BRPM | TEMPERATURE: 97.8 F | SYSTOLIC BLOOD PRESSURE: 117 MMHG | DIASTOLIC BLOOD PRESSURE: 56 MMHG | OXYGEN SATURATION: 95 % | HEART RATE: 81 BPM

## 2021-01-22 DIAGNOSIS — S31.109A OPEN WOUND OF ABDOMEN, INITIAL ENCOUNTER: Primary | ICD-10-CM

## 2021-01-22 DIAGNOSIS — Z98.890 HISTORY OF REVERSAL OF ILEOSTOMY: ICD-10-CM

## 2021-01-22 DIAGNOSIS — T14.8XXA PAIN ASSOCIATED WITH WOUND: ICD-10-CM

## 2021-01-22 DIAGNOSIS — R52 PAIN ASSOCIATED WITH WOUND: ICD-10-CM

## 2021-01-22 DIAGNOSIS — S31.109A OPEN WOUND OF ABDOMINAL WALL, INITIAL ENCOUNTER: ICD-10-CM

## 2021-01-22 PROCEDURE — 99213 OFFICE O/P EST LOW 20 MIN: CPT

## 2021-01-22 PROCEDURE — 17250 CHEM CAUT OF GRANLTJ TISSUE: CPT | Performed by: NURSE PRACTITIONER

## 2021-01-22 PROCEDURE — 17250 CHEM CAUT OF GRANLTJ TISSUE: CPT

## 2021-01-22 ASSESSMENT — ENCOUNTER SYMPTOMS
DIARRHEA: 1
VOMITING: 0
DEPRESSION: 0
NERVOUS/ANXIOUS: 0
FEVER: 0
NAUSEA: 0
COUGH: 0
CHILLS: 0
SHORTNESS OF BREATH: 0
CONSTIPATION: 0

## 2021-01-22 ASSESSMENT — PAIN SCALES - GENERAL: PAINLEVEL: NO PAIN

## 2021-01-22 NOTE — PATIENT INSTRUCTIONS
-Keep your wound dressing clean, dry, and intact.    -Change your dressing if it becomes soiled, soaked, or falls off.    -Should you experience any significant changes in your wound(s), such as infection (redness, swelling, localized heat, increased pain, fever > 101 F, chills) or have any questions regarding your home care instructions, please contact the wound center at [phone]. If after hours, contact your primary care physician or go to the hospital emergency room.

## 2021-01-22 NOTE — PROGRESS NOTES
Provider Encounter- Full Thickness wound    HISTORY OF PRESENT ILLNESS  Wound History:    START OF CARE IN CLINIC: 12/30/2020    REFERRING PROVIDER: Ángel Keenan M.D.     WOUND- Full Thickness Wound   LOCATION: Abdominal right lower quadrant   HISTORY: 70-year-old male with history of low anterior resection and loop ileostomy creation for treatment of colorectal cancer.  After completing chemo and radiation therapy, patient opted to have his ostomy reversed.  He underwent an ileostomy takedown on 12/15/2020 by Dr. Casey Aquino, and was discharged the following day.  3 days later, he presented to the emergency room with abdominal distention and pain.  Surgical site had apparently dehisced, VAC was placed.  CT revealed abdominal wall cellulitis, cecal constipation and presacral abscess.  A flexible sigmoidoscopy was done to check anastomosis, no evidence of stricture.  A CT-guided drain was placed on 12/23.  He was discharged home on 12/29 on p.o. Augmentin and Flagyl, referred to home health and [facility] for management of his wound.    Pertinent Medical History: Colorectal cancer status post chemoradiation therapy, hypertension    TOBACCO USE: He has never smoked or used smokeless tobacco    Patient's problem list, allergies, and current medications reviewed and updated in Epic    Interval History:  12/30/2020: Initial clinic visit with SNOW Donato.  Patient is accompanied by his wife today who provides much of wound history.  Patient states he is feeling well overall, denies fevers, chills, nausea, vomiting, cough or shortness of breath.  Home health is scheduled to come to his home tomorrow for initial evaluation.  Wife has multiple concerns, worried that home health will not be able to manage this wound appropriately.  She was assured that thorough instructions would be sent to home health agency.  I also arranged for [facility] rep, Belinda Connell RN, CWOCN, to be present for VAC dressing change on  Monday.      1/8/2021 : Clinic visit with SNOW Donato, ETHAN, RAMONA, ANGELINE.  Casey is again accompanied by his wife today.  He states he is feeling well,denies fevers, chills, nausea, vomiting, cough or shortness of breath.  He is eating well and bowel movements are returning to normal.  Home health has been changing his VAC, they are satisfied with wound progress.  The tract, noticed last clinic visit, has decreased significantly.    1/15/2021 : Clinic visit with SNOW Donato, ETHAN, RAMONA, ANGELINE.  Hai presents to clinic today, again accompanied by his wife.  He states he is feeling well,denies fevers, chills, nausea, vomiting, cough or shortness of breath.  It is noted that there is fluid collected around the wound, under VAC drape.  Patient denies any alarm from VAC.  Home health has been changing his dressing in between clinic visits.    1/22/2021 : Clinic visit with SNOW Donato, ETHAN, RAMONA, ANGELINE.  Hai presents to clinic today, again accompanied by his wife.  He has been feeling well, denies fevers, chills, nausea, vomiting, cough or shortness of breath.  He reports that his appetite is getting back to normal, and bowel movements less frequent and more formed.  VAC has been off since last visit due to periwound irritation.  Has improved somewhat, patient states very little itching or pain.  Decision made today to discontinue VAC altogether.  Patient is agreeable, and states he will send machine back to [facility].    REVIEW OF SYSTEMS:   Review of Systems   Constitutional: Negative for chills and fever.   Respiratory: Negative for cough and shortness of breath.    Cardiovascular: Positive for leg swelling. Negative for chest pain.        Swelling in both legs since surgery, improving   Gastrointestinal: Positive for diarrhea. Negative for constipation, nausea and vomiting.        Loose stools since ileostomy takedown, beginning to firm   Genitourinary: Negative for dysuria.   Musculoskeletal:  Negative for joint pain.   Skin: Negative for rash.   Psychiatric/Behavioral: Negative for depression. The patient is not nervous/anxious.        PHYSICAL EXAMINATION:   /56   Pulse 81   Temp 36.6 °C (97.8 °F)   Resp 20   SpO2 95%     Physical Exam   Constitutional: He is oriented to person, place, and time and well-developed, well-nourished, and in no distress.   HENT:   Head: Normocephalic.   Eyes: Pupils are equal, round, and reactive to light.   Pulmonary/Chest: Effort normal.   Abdominal: He exhibits distension. There is no abdominal tenderness.   Abdomen slightly distended and firm   Musculoskeletal: Normal range of motion.         General: Edema present.      Comments: Nonpitting edema bilateral lower extremities   Neurological: He is alert and oriented to person, place, and time.   Skin: Skin is warm.   Full-thickness wound to right lower quadrant, tracts x2  Refer to wound flowsheet and photos   Psychiatric: Mood, memory, affect and judgment normal.       WOUND ASSESSMENT             Wound 12/30/20 RLQ abdomen (Active)   Wound Image    01/22/21 0900   Site Assessment Pink;Yellow 01/22/21 0900   Periwound Assessment Blanchable erythema;Rash;Red 01/22/21 0900   Margins Attached edges 01/22/21 0900   Drainage Amount Moderate 01/22/21 0900   Drainage Description Serosanguineous 01/22/21 0900   Treatments Cleansed;Topical Lidocaine;Provider debridement 01/15/21 0940   Wound Cleansing Normal Saline Irrigation 01/22/21 0900   Periwound Protectant Skin Protectant Wipes to Periwound;Barrier Paste 01/22/21 0900   Dressing Cleansing/Solutions Normal Saline 01/22/21 0900   Dressing Options Hydrofiber Silver;Silicone Adhesive Foam 01/22/21 0900   Dressing Changed New 01/22/21 0900   Dressing Status Clean;Dry;Intact 01/22/21 0900   Non-staged Wound Description Full thickness 01/22/21 0900   Wound Length (cm) 1.6 cm 01/22/21 0900   Wound Width (cm) 3.8 cm 01/22/21 0900   Wound Depth (cm) 0.4 cm 01/22/21 0900    Wound Surface Area (cm^2) 6.08 cm^2 21 0900   Wound Volume (cm^3) 2.43 cm^3 21   Post-Procedure Length (cm) 1.6 cm 21   Post-Procedure Width (cm) 3.8 cm 21   Post-Procedure Depth (cm) 0.4 cm 21   Post-Procedure Surface Area (cm^2) 6.08 cm^2 2100   Post-Procedure Volume (cm^3) 2.43 cm^3 21 09   Wound Healing % 76 21   Tunneling (cm) 0 cm 21   Tunneling Clock Position of Wound 3 21 1035   Undermining (cm) 0 cm 21   Wound Odor None 21   Exposed Structures None 21                 PROCEDURE: AgNO3 to hypergranulation of abdominal wound  -2% viscous lidocaine applied topically to wound bed for approximately 5 minutes prior to treatment  -No need for debridement today, wound bed hyper granulated  -AgNO3 to hyper granulation of wound, entire wound bed treated, 6.08 cm²  -Wound care completed by wound RN, refer to flowsheet  -Patient tolerated the procedure well, without c/o pain or discomfort.       Pertinent Labs and Diagnostics:    Labs:     A1c: No results found for: HBA1C       IMAGIN2020-CT of abdomen and pelvis with contrast  IMPRESSION:        1.  Presacral mildly enhancing fluid collection, appearance suggests abscess.  2.  Large quantity of stool in the cecum favors changes of constipation. Component of evolving [place]'s or ileus not excluded. Recommend radiographic follow-up to resolution  3.  Bladder wall thickening, consider changes of cystitis, correlate with urinalysis  4.  Bilateral fat-containing inguinal hernias  5.  Small layering left pleural effusion  6.  Fat-containing umbilical hernia  7.  Atherosclerosis and atherosclerotic coronary artery disease.      VASCULAR STUDIES: N/A    LAST  WOUND CULTURE:  DATE : None found in epic             ASSESSMENT AND PLAN:     1. Open wound of abdominal wall, initial encounter  Comments: Dehiscence of surgical wound, status  post ileostomy takedown.  Patient developed abscess postoperatively.    1/22/2021: Wound area continues to decrease steadily.  Periwound irritation improved with discontinuation of VAC.  Wound bed is quite hyper granulated however.  -AgNO3 to hyper granulation of wound in clinic today, medically necessary to promote wound healing.  -Patient to return to clinic weekly for assessment and debridement  -Discontinue VAC  -Home health to change dressing 1- 2 times per week in between clinic visits.    -We will consider restarting VAC next clinic visit if periwound skin has resolved.  However, if wound continues to improve without NP WT, will discontinue VAC altogether.    Wound care: Collagen to accelerate granulation, Hydrofiber to manage exudate, foam cover dressing, Hypafix tape        2. Pain associated with wound    1/22/2021:  -2% viscous lidocaine applied topically to wound bed for approximately 5 minutes prior to debridement  -Patient tolerated procedure today with no complaints of discomfort.    3. History of colorectal cancer  Comments: Status post chemoradiation therapy, LAR, and ileostomy reversal      PATIENT EDUCATION  - Importance of adequate nutrition for wound healing  -Advised to go to ER for any increased redness, swelling, drainage, or odor, or if patient develops fever, chills, nausea or vomiting.       20 min spent  with patient, time spent counseling, coordinating care, reviewing records, discussing POC, educating patient regarding wound healing and progression.  This time was spent in excess to procedure time.     Please note that this note may have been created using voice recognition software. I have worked with technical experts from [facility] to optimize the interface.  I have made every reasonable attempt to correct obvious errors, but there may be errors of grammar and possibly content that I did not discover before finalizing the note.    N

## 2021-01-29 ENCOUNTER — OFFICE VISIT (OUTPATIENT)
Dept: WOUND CARE | Facility: MEDICAL CENTER | Age: 71
End: 2021-01-29
Attending: FAMILY MEDICINE
Payer: MEDICARE

## 2021-01-29 VITALS
HEART RATE: 78 BPM | DIASTOLIC BLOOD PRESSURE: 62 MMHG | OXYGEN SATURATION: 95 % | TEMPERATURE: 97.4 F | RESPIRATION RATE: 20 BRPM | SYSTOLIC BLOOD PRESSURE: 122 MMHG

## 2021-01-29 DIAGNOSIS — S31.109A OPEN WOUND OF ABDOMEN, INITIAL ENCOUNTER: ICD-10-CM

## 2021-01-29 DIAGNOSIS — T14.8XXA PAIN ASSOCIATED WITH WOUND: ICD-10-CM

## 2021-01-29 DIAGNOSIS — Z85.048 HISTORY OF COLORECTAL CANCER: ICD-10-CM

## 2021-01-29 DIAGNOSIS — R52 PAIN ASSOCIATED WITH WOUND: ICD-10-CM

## 2021-01-29 PROCEDURE — 11042 DBRDMT SUBQ TIS 1ST 20SQCM/<: CPT | Performed by: NURSE PRACTITIONER

## 2021-01-29 PROCEDURE — 11042 DBRDMT SUBQ TIS 1ST 20SQCM/<: CPT

## 2021-01-29 ASSESSMENT — PAIN SCALES - GENERAL: PAINLEVEL: NO PAIN

## 2021-01-29 ASSESSMENT — ENCOUNTER SYMPTOMS
CONSTIPATION: 0
DIARRHEA: 1
NERVOUS/ANXIOUS: 0
DEPRESSION: 0
NAUSEA: 0
COUGH: 0
FEVER: 0
VOMITING: 0
SHORTNESS OF BREATH: 0
CHILLS: 0

## 2021-01-29 NOTE — PROGRESS NOTES
Patient reports that he has returned his wound vac to [facility], and his home health services have been discontinued.

## 2021-01-29 NOTE — PROGRESS NOTES
Provider Encounter- Full Thickness wound    HISTORY OF PRESENT ILLNESS  Wound History:    START OF CARE IN CLINIC: 12/30/2020    REFERRING PROVIDER: Ángel Keenan M.D.     WOUND- Full Thickness Wound   LOCATION: Abdominal right lower quadrant   HISTORY: 70-year-old male with history of low anterior resection and loop ileostomy creation for treatment of colorectal cancer.  After completing chemo and radiation therapy, patient opted to have his ostomy reversed.  He underwent an ileostomy takedown on 12/15/2020 by Dr. Casey Aquino, and was discharged the following day.  3 days later, he presented to the emergency room with abdominal distention and pain.  Surgical site had apparently dehisced, VAC was placed.  CT revealed abdominal wall cellulitis, cecal constipation and presacral abscess.  A flexible sigmoidoscopy was done to check anastomosis, no evidence of stricture.  A CT-guided drain was placed on 12/23.  He was discharged home on 12/29 on p.o. Augmentin and Flagyl, referred to home health and [facility] for management of his wound.    Pertinent Medical History: Colorectal cancer status post chemoradiation therapy, hypertension    TOBACCO USE: He has never smoked or used smokeless tobacco    Patient's problem list, allergies, and current medications reviewed and updated in Epic    Interval History:  12/30/2020: Initial clinic visit with SNOW Donato.  Patient is accompanied by his wife today who provides much of wound history.  Patient states he is feeling well overall, denies fevers, chills, nausea, vomiting, cough or shortness of breath.  Home health is scheduled to come to his home tomorrow for initial evaluation.  Wife has multiple concerns, worried that home health will not be able to manage this wound appropriately.  She was assured that thorough instructions would be sent to home health agency.  I also arranged [facility], Belinda Connell RN, CWOCN, to be present for VAC dressing change on  Monday.      1/8/2021 : Clinic visit with SNOW Donato, ETHAN, RAMONA, ANGELINE.  Casey is again accompanied by his wife today.  He states he is feeling well,denies fevers, chills, nausea, vomiting, cough or shortness of breath.  He is eating well and bowel movements are returning to normal.  Home health has been changing his VAC, they are satisfied with wound progress.  The tract, noticed last clinic visit, has decreased significantly.    1/15/2021 : Clinic visit with SNOW Donato, ETHAN, RAMONA, ANGELINE.  Hai presents to clinic today, again accompanied by his wife.  He states he is feeling well,denies fevers, chills, nausea, vomiting, cough or shortness of breath.  It is noted that there is fluid collected around the wound, under VAC drape.  Patient denies any alarm from VAC.  Home health has been changing his dressing in between clinic visits.    1/22/2021 : Clinic visit with SNOW Donato, ETHAN, RAMONA, ANGELINE.  Hai presents to clinic today, again accompanied by his wife.  He has been feeling well, denies fevers, chills, nausea, vomiting, cough or shortness of breath.  He reports that his appetite is getting back to normal, and bowel movements less frequent and more formed.  VAC has been off since last visit due to periwound irritation.  Has improved somewhat, patient states very little itching or pain.  Decision made today to discontinue VAC altogether.  Patient is agreeable, and states he will send machine back to [facility].    1/29/2021 : Clinic visit with SNOW Donato FNP-BC, RAMONA, ANGELINE.  Hai continues to feel well, denies fevers, chills, nausea, vomiting, cough or shortness of breath.  His appetite is good, weight stable.  He plans to follow-up with Dr. Aquino in the next week or 2.      REVIEW OF SYSTEMS:   Review of Systems   Constitutional: Negative for chills and fever.   Respiratory: Negative for cough and shortness of breath.    Cardiovascular: Positive for leg swelling. Negative for  chest pain.        Swelling in both legs since surgery, improving   Gastrointestinal: Positive for diarrhea. Negative for constipation, nausea and vomiting.        Loose stools since ileostomy takedown, beginning to firm   Genitourinary: Negative for dysuria.   Musculoskeletal: Negative for joint pain.   Skin: Negative for rash.   Psychiatric/Behavioral: Negative for depression. The patient is not nervous/anxious.        PHYSICAL EXAMINATION:   /62   Pulse 78   Temp 36.3 °C (97.4 °F)   Resp 20   SpO2 95%     Physical Exam   Constitutional: He is oriented to person, place, and time and well-developed, well-nourished, and in no distress.   HENT:   Head: Normocephalic.   Eyes: Pupils are equal, round, and reactive to light.   Pulmonary/Chest: Effort normal.   Abdominal: He exhibits distension. There is no abdominal tenderness.   Abdomen slightly distended and firm   Musculoskeletal: Normal range of motion.         General: Edema present.      Comments: Nonpitting edema bilateral lower extremities   Neurological: He is alert and oriented to person, place, and time.   Skin: Skin is warm.   Full-thickness wound to right lower quadrant, tracts x2  Refer to wound flowsheet and photos   Psychiatric: Mood, memory, affect and judgment normal.       WOUND ASSESSMENT       Wound 12/30/20 RLQ abdomen (Active)   Wound Image    01/29/21 1000   Site Assessment Red;Other (Comment) 01/29/21 1000   Periwound Assessment Intact;Rash 01/29/21 1000   Margins Attached edges 01/29/21 1000   Drainage Amount Moderate 01/29/21 1000   Drainage Description Serosanguineous 01/29/21 1000   Treatments Cleansed;Provider debridement;Silver nitrate 01/29/21 1000   Wound Cleansing Normal Saline Irrigation 01/29/21 1000   Periwound Protectant Skin Protectant Wipes to Periwound;Barrier Paste 01/29/21 1000   Dressing Cleansing/Solutions Not Applicable 01/29/21 1000   Dressing Options Hydrofera Blue Ready;Silicone Adhesive Foam 01/29/21 1000    Dressing Changed New 21 1000   Dressing Status Clean;Dry;Intact 21 0900   Non-staged Wound Description Full thickness 21 1000   Wound Length (cm) 1.6 cm 21 1000   Wound Width (cm) 2.1 cm 21 1000   Wound Depth (cm) 0.4 cm 21 0900   Wound Surface Area (cm^2) 3.36 cm^2 21 1000   Wound Volume (cm^3) 2.43 cm^3 21 0900   Post-Procedure Length (cm) 1.6 cm 21 1000   Post-Procedure Width (cm) 2.6 cm 21 1000   Post-Procedure Depth (cm) 0.4 cm 21 0900   Post-Procedure Surface Area (cm^2) 4.16 cm^2 21 1000   Post-Procedure Volume (cm^3) 2.43 cm^3 21 0900   Wound Healing % 76 21 0900   Tunneling (cm) 0 cm 21 1000   Tunneling Clock Position of Wound 3 21 1035   Undermining (cm) 0 cm 21 1000   Wound Odor None 21 1000   Exposed Structures None 21 1000            PROCEDURE: Excisional debridement and AgNO3 to hypergranulation of abdominal wound  -2% viscous lidocaine applied topically to wound bed for approximately 5 minutes prior to treatment  -Curette used to take down hypergranulation, total area debrided 3.36 cm².  Tissue debrided into the subcutaneous layer.  -AgNO3 then used to treat remaining hyper granulation.  -Wound care completed by wound RN, refer to flowsheet  -Patient tolerated the procedure well, without c/o pain or discomfort.       Pertinent Labs and Diagnostics:    Labs:     A1c: No results found for: HBA1C       IMAGIN2020-CT of abdomen and pelvis with contrast  IMPRESSION:        1.  Presacral mildly enhancing fluid collection, appearance suggests abscess.  2.  Large quantity of stool in the cecum favors changes of constipation. Component of evolving Natalia's or ileus not excluded. Recommend radiographic follow-up to resolution  3.  Bladder wall thickening, consider changes of cystitis, correlate with urinalysis  4.  Bilateral fat-containing inguinal hernias  5.  Small layering left  pleural effusion  6.  Fat-containing umbilical hernia  7.  Atherosclerosis and atherosclerotic coronary artery disease.      VASCULAR STUDIES: N/A    LAST  WOUND CULTURE:  DATE : None found in epic             ASSESSMENT AND PLAN:     1. Open wound of abdominal wall, initial encounter  Comments: Dehiscence of surgical wound, status post ileostomy takedown.  Patient developed abscess postoperatively.    1/29/2021: Wound area continues to decrease steadily.  Wound bed still hyper granulating.  --Excisional debridement of hyper granulation and senescent red tissue  in clinic today, medically necessary to promote wound healing.  -AgNO3 to meeting hypergranulation in order to promote epithelialization  -Patient to return to clinic weekly for assessment and debridement  -Home health to change dressing 1- 2 times per week in between clinic visits.    -Consider topical steroid next clinic visit if hypergranulation continues to be an issue.      Wound care: Hydrofera Blue to manage exudate and bioburden, foam cover dressing, Hypafix tape        2. Pain associated with wound    1/29/2021:  -2% viscous lidocaine applied topically to wound bed for approximately 5 minutes prior to debridement  -Patient tolerated procedure today with no complaints of discomfort.    3. History of colorectal cancer  Comments: Status post chemoradiation therapy, LAR, and ileostomy reversal      PATIENT EDUCATION  - Importance of adequate nutrition for wound healing  -Advised to go to ER for any increased redness, swelling, drainage, or odor, or if patient develops fever, chills, nausea or vomiting.           Please note that this note may have been created using voice recognition software. I have worked with technical experts from [facility] to optimize the interface.  I have made every reasonable attempt to correct obvious errors, but there may be errors of grammar and possibly content that I did not discover before finalizing the note.    N

## 2021-01-29 NOTE — PATIENT INSTRUCTIONS
-Keep dressings clean and dry. Change dressings once between wound clinic visits, and if they become over saturated, soiled or fall off.     -Should you experience any significant changes in your wound(s), such as infection (redness, swelling, localized heat, increased pain, fever > 101 F, chills) or have any questions regarding your home care instructions, please contact the wound center at [phone]. If after hours, contact your primary care physician or go to the hospital emergency room.

## 2021-02-05 ENCOUNTER — OFFICE VISIT (OUTPATIENT)
Dept: WOUND CARE | Facility: MEDICAL CENTER | Age: 71
End: 2021-02-05
Attending: FAMILY MEDICINE
Payer: MEDICARE

## 2021-02-05 VITALS
SYSTOLIC BLOOD PRESSURE: 135 MMHG | OXYGEN SATURATION: 94 % | DIASTOLIC BLOOD PRESSURE: 72 MMHG | RESPIRATION RATE: 16 BRPM | TEMPERATURE: 97.7 F | HEART RATE: 77 BPM

## 2021-02-05 DIAGNOSIS — S31.109D OPEN WOUND OF ABDOMEN, SUBSEQUENT ENCOUNTER: ICD-10-CM

## 2021-02-05 DIAGNOSIS — Z85.048 HISTORY OF COLORECTAL CANCER: ICD-10-CM

## 2021-02-05 DIAGNOSIS — T14.8XXA PAIN ASSOCIATED WITH WOUND: ICD-10-CM

## 2021-02-05 DIAGNOSIS — R52 PAIN ASSOCIATED WITH WOUND: ICD-10-CM

## 2021-02-05 PROCEDURE — 17250 CHEM CAUT OF GRANLTJ TISSUE: CPT

## 2021-02-05 PROCEDURE — 17250 CHEM CAUT OF GRANLTJ TISSUE: CPT | Performed by: NURSE PRACTITIONER

## 2021-02-05 ASSESSMENT — ENCOUNTER SYMPTOMS
VOMITING: 0
CONSTIPATION: 0
COUGH: 0
NAUSEA: 0
NERVOUS/ANXIOUS: 0
DEPRESSION: 0
FEVER: 0
DIARRHEA: 1
SHORTNESS OF BREATH: 0
CHILLS: 0

## 2021-02-05 NOTE — PATIENT INSTRUCTIONS
-Keep dressings clean and dry. Change dressings once between wound clinic visits, and if they become over saturated, soiled or fall off.     -Should you experience any significant changes in your wound(s), such as infection (redness, swelling, localized heat, increased pain, fever > 101 F, chills) or have any questions regarding your home care instructions, please contact the wound center at [phone]. If after hours, contact your primary care physician or go to the hospital emergency room.

## 2021-02-05 NOTE — PROGRESS NOTES
Provider Encounter- Full Thickness wound    HISTORY OF PRESENT ILLNESS  Wound History:    START OF CARE IN CLINIC: 12/30/2020    REFERRING PROVIDER: Ángel Keenan M.D.     WOUND- Full Thickness Wound   LOCATION: Abdominal right lower quadrant   HISTORY: 70-year-old male with history of low anterior resection and loop ileostomy creation for treatment of colorectal cancer.  After completing chemo and radiation therapy, patient opted to have his ostomy reversed.  He underwent an ileostomy takedown on 12/15/2020 by Dr. Casey Aquino, and was discharged the following day.  3 days later, he presented to the emergency room with abdominal distention and pain.  Surgical site had apparently dehisced, VAC was placed.  CT revealed abdominal wall cellulitis, cecal constipation and presacral abscess.  A flexible sigmoidoscopy was done to check anastomosis, no evidence of stricture.  A CT-guided drain was placed on 12/23.  He was discharged home on 12/29 on p.o. Augmentin and Flagyl, referred to home health and [facility] for management of his wound.    Pertinent Medical History: Colorectal cancer status post chemoradiation therapy, hypertension    TOBACCO USE: He has never smoked or used smokeless tobacco    Patient's problem list, allergies, and current medications reviewed and updated in Epic    Interval History:  12/30/2020: Initial clinic visit with SNOW Donato.  Patient is accompanied by his wife today who provides much of wound history.  Patient states he is feeling well overall, denies fevers, chills, nausea, vomiting, cough or shortness of breath.  Home health is scheduled to come to his home tomorrow for initial evaluation.  Wife has multiple concerns, worried that home health will not be able to manage this wound appropriately.  She was assured that thorough instructions would be sent to home health agency.  I also arranged for [facility] rep, Belinda Connell RN, CWOCN, to be present for VAC dressing change on  Monday.      1/8/2021 : Clinic visit with SNOW Donato FNP-BC, ANGELINE GREENE.  Casey is again accompanied by his wife today.  He states he is feeling well,denies fevers, chills, nausea, vomiting, cough or shortness of breath.  He is eating well and bowel movements are returning to normal.  Home health has been changing his VAC, they are satisfied with wound progress.  The tract, noticed last clinic visit, has decreased significantly.    1/15/2021 : Clinic visit with SNOW Donato, ETHAN, ANGELINE GREENE.  Hai presents to clinic today, again accompanied by his wife.  He states he is feeling well,denies fevers, chills, nausea, vomiting, cough or shortness of breath.  It is noted that there is fluid collected around the wound, under VAC drape.  Patient denies any alarm from VAC.  Home health has been changing his dressing in between clinic visits.    1/22/2021 : Clinic visit with SNOW Donato FNP-BC, ANGELINE GREENE.  Hai presents to clinic today, again accompanied by his wife.  He has been feeling well, denies fevers, chills, nausea, vomiting, cough or shortness of breath.  He reports that his appetite is getting back to normal, and bowel movements less frequent and more formed.  VAC has been off since last visit due to periwound irritation.  Has improved somewhat, patient states very little itching or pain.  Decision made today to discontinue VAC altogether.  Patient is agreeable, and states he will send machine back to [facility].    1/29/2021 : Clinic visit with SNOW Donato FNP-BC, ANGELINE GREENE.  Hai continues to feel well, denies fevers, chills, nausea, vomiting, cough or shortness of breath.  His appetite is good, weight stable.  He plans to follow-up with Dr. Aquino in the next week or 2.      2/5/2021 : Clinic visit with SNOW Donato FNP-BC, ANGELINE GREENE.  Hai is feeling very well today, denies fevers, chills, nausea, vomiting, cough or shortness of breath.  He was seen by his surgeon  yesterday, and released to resume regular activity, does not need to see surgeon again.    REVIEW OF SYSTEMS:   Review of Systems   Constitutional: Negative for chills and fever.   Respiratory: Negative for cough and shortness of breath.    Cardiovascular: Positive for leg swelling. Negative for chest pain.        Swelling in both legs since surgery, improving   Gastrointestinal: Positive for diarrhea. Negative for constipation, nausea and vomiting.        Loose stools since ileostomy takedown, beginning to firm   Genitourinary: Negative for dysuria.   Musculoskeletal: Negative for joint pain.   Skin: Negative for rash.   Psychiatric/Behavioral: Negative for depression. The patient is not nervous/anxious.        PHYSICAL EXAMINATION:   /72   Pulse 77   Temp 36.5 °C (97.7 °F) (Temporal)   Resp 16   SpO2 94%     Physical Exam   Constitutional: He is oriented to person, place, and time and well-developed, well-nourished, and in no distress.   HENT:   Head: Normocephalic.   Eyes: Pupils are equal, round, and reactive to light.   Pulmonary/Chest: Effort normal.   Abdominal: He exhibits distension. There is no abdominal tenderness.   Abdomen slightly distended and firm   Musculoskeletal: Normal range of motion.         General: Edema present.      Comments: Nonpitting edema bilateral lower extremities   Neurological: He is alert and oriented to person, place, and time.   Skin: Skin is warm.   Full-thickness wound to right lower quadrant, tracts x2  Refer to wound flowsheet and photos   Psychiatric: Mood, memory, affect and judgment normal.       WOUND ASSESSMENT          Wound 12/30/20 RLQ abdomen (Active)   Wound Image   02/05/21 1010   Site Assessment Pink;Other (Comment) 02/05/21 1010   Periwound Assessment Scar tissue 02/05/21 1010   Margins Attached edges 02/05/21 1010   Drainage Amount Moderate 02/05/21 1010   Drainage Description Serosanguineous 02/05/21 1010   Treatments Cleansed;Chemical Debridement  21 1010   Wound Cleansing Normal Saline Irrigation 21 1010   Periwound Protectant Skin Protectant Wipes to Periwound 21 101   Dressing Cleansing/Solutions Not Applicable 21 1010   Dressing Options Hydrofera Blue Ready;Silicone Adhesive Foam 21 101   Dressing Changed Changed 21 1010   Dressing Status Clean;Dry;Intact 21 09   Non-staged Wound Description Full thickness 21 1010   Wound Length (cm) 0.7 cm 21 1010   Wound Width (cm) 1.5 cm 21 1010   Wound Depth (cm) 0.4 cm 21   Wound Surface Area (cm^2) 1.05 cm^2 21 1010   Wound Volume (cm^3) 2.43 cm^3 21 0900   Post-Procedure Length (cm) 0.7 cm 21 1010   Post-Procedure Width (cm) 1.5 cm 21 1010   Post-Procedure Depth (cm) 0.4 cm 21 0900   Post-Procedure Surface Area (cm^2) 1.05 cm^2 21 1010   Post-Procedure Volume (cm^3) 2.43 cm^3 21 0900   Wound Healing % 76 21 0900   Wound Bed Granulation (%) 100 % 21   Wound Bed Epithelium (%) 0 % 21   Wound Bed Slough (%) 0 % 21   Wound Bed Eschar (%) 0 % 21 1010   Tunneling (cm) 0 cm 21 1010   Tunneling Clock Position of Wound 3 21 1035   Undermining (cm) 0 cm 21 1010   Wound Odor None 21 1010   Exposed Structures None 21 1010            PROCEDURE: Excisional debridement and AgNO3 to hypergranulation of abdominal wound  -2% viscous lidocaine applied topically to wound bed for approximately 5 minutes prior to treatment  -No need for excisional debridement today, wound bed hypergranulated  -AgNO3 then used to treat  hyper granulation.  -Wound care completed by wound RN, refer to flowsheet  -Patient tolerated the procedure well, without c/o pain or discomfort.       Pertinent Labs and Diagnostics:    Labs:     A1c: No results found for: HBA1C       IMAGIN2020-CT of abdomen and pelvis with contrast  IMPRESSION:        1.  Presacral  mildly enhancing fluid collection, appearance suggests abscess.  2.  Large quantity of stool in the cecum favors changes of constipation. Component of evolving Bandana's or ileus not excluded. Recommend radiographic follow-up to resolution  3.  Bladder wall thickening, consider changes of cystitis, correlate with urinalysis  4.  Bilateral fat-containing inguinal hernias  5.  Small layering left pleural effusion  6.  Fat-containing umbilical hernia  7.  Atherosclerosis and atherosclerotic coronary artery disease.      VASCULAR STUDIES: N/A    LAST  WOUND CULTURE:  DATE : None found in epic             ASSESSMENT AND PLAN:     1. Open wound of abdominal wall, subsequent encounter  Comments: Dehiscence of surgical wound, status post ileostomy takedown.  Patient developed abscess postoperatively.    2/5/2021: Wound nearly resolved, however continues to hyper granulated.  -AgNO3 to treat hypergranulation in order to promote epithelialization  -Patient to return to clinic weekly for assessment and debridement  -Home health to change dressing 1- 2 times per week in between clinic visits.    -Consider topical steroid next clinic visit if hypergranulation continues to be an issue.      Wound care: Hydrofera Blue to manage exudate and bioburden, foam cover dressing, Hypafix tape        2. Pain associated with wound    2/5/2021: Patient has had very little pain from his wound over the past few weeks  -2% viscous lidocaine applied topically to wound bed for approximately 5 minutes prior to debridement  -Patient tolerated procedure today with no complaints of discomfort.    3. History of colorectal cancer  Comments: Status post chemoradiation therapy, LAR, and ileostomy reversal    2/5/2021: Seen by a surgeon yesterday, was told did not need any further intervention.    PATIENT EDUCATION  - Importance of adequate nutrition for wound healing  -Advised to go to ER for any increased redness, swelling, drainage, or odor, or if  patient develops fever, chills, nausea or vomiting.           Please note that this note may have been created using voice recognition software. I have worked with technical experts from [facility] to optimize the interface.  I have made every reasonable attempt to correct obvious errors, but there may be errors of grammar and possibly content that I did not discover before finalizing the note.    N

## 2021-02-12 ENCOUNTER — OFFICE VISIT (OUTPATIENT)
Dept: WOUND CARE | Facility: MEDICAL CENTER | Age: 71
End: 2021-02-12
Attending: FAMILY MEDICINE
Payer: MEDICARE

## 2021-02-12 VITALS
SYSTOLIC BLOOD PRESSURE: 134 MMHG | OXYGEN SATURATION: 97 % | RESPIRATION RATE: 20 BRPM | HEART RATE: 73 BPM | DIASTOLIC BLOOD PRESSURE: 66 MMHG | TEMPERATURE: 97.7 F

## 2021-02-12 DIAGNOSIS — T14.8XXA PAIN ASSOCIATED WITH WOUND: ICD-10-CM

## 2021-02-12 DIAGNOSIS — S31.109D OPEN WOUND OF ABDOMEN, SUBSEQUENT ENCOUNTER: Primary | ICD-10-CM

## 2021-02-12 DIAGNOSIS — Z85.048 HISTORY OF COLORECTAL CANCER: ICD-10-CM

## 2021-02-12 DIAGNOSIS — R52 PAIN ASSOCIATED WITH WOUND: ICD-10-CM

## 2021-02-12 PROCEDURE — 99213 OFFICE O/P EST LOW 20 MIN: CPT

## 2021-02-12 PROCEDURE — 99213 OFFICE O/P EST LOW 20 MIN: CPT | Performed by: NURSE PRACTITIONER

## 2021-02-12 ASSESSMENT — ENCOUNTER SYMPTOMS
CONSTIPATION: 0
CHILLS: 0
DIARRHEA: 0
NAUSEA: 0
FEVER: 0
NERVOUS/ANXIOUS: 0
VOMITING: 0
DEPRESSION: 0
COUGH: 0
SHORTNESS OF BREATH: 0

## 2021-02-12 ASSESSMENT — PAIN SCALES - GENERAL: PAINLEVEL: NO PAIN

## 2021-02-12 NOTE — PROGRESS NOTES
Provider Encounter- Full Thickness wound    HISTORY OF PRESENT ILLNESS  Wound History:    START OF CARE IN CLINIC: 12/30/2020    REFERRING PROVIDER: Ángel Keenan M.D.     WOUND- Full Thickness Wound   LOCATION: Abdominal right lower quadrant   HISTORY: 70-year-old male with history of low anterior resection and loop ileostomy creation for treatment of colorectal cancer.  After completing chemo and radiation therapy, patient opted to have his ostomy reversed.  He underwent an ileostomy takedown on 12/15/2020 by Dr. Casey Aquino, and was discharged the following day.  3 days later, he presented to the emergency room with abdominal distention and pain.  Surgical site had apparently dehisced, VAC was placed.  CT revealed abdominal wall cellulitis, cecal constipation and presacral abscess.  A flexible sigmoidoscopy was done to check anastomosis, no evidence of stricture.  A CT-guided drain was placed on 12/23.  He was discharged home on 12/29 on p.o. Augmentin and Flagyl, referred to home health and [facility] for management of his wound.    Pertinent Medical History: Colorectal cancer status post chemoradiation therapy, hypertension    TOBACCO USE: He has never smoked or used smokeless tobacco    Patient's problem list, allergies, and current medications reviewed and updated in Epic    Interval History:  12/30/2020: Initial clinic visit with SNOW Donato.  Patient is accompanied by his wife today who provides much of wound history.  Patient states he is feeling well overall, denies fevers, chills, nausea, vomiting, cough or shortness of breath.  Home health is scheduled to come to his home tomorrow for initial evaluation.  Wife has multiple concerns, worried that home health will not be able to manage this wound appropriately.  She was assured that thorough instructions would be sent to home health agency.  I also arranged for [facility] rep, Belinda Connell RN, CWOCN, to be present for VAC dressing change on  Monday.      1/8/2021 : Clinic visit with SNOW Donato FNP-BC, ANGELINE GREENE.  Casey is again accompanied by his wife today.  He states he is feeling well,denies fevers, chills, nausea, vomiting, cough or shortness of breath.  He is eating well and bowel movements are returning to normal.  Home health has been changing his VAC, they are satisfied with wound progress.  The tract, noticed last clinic visit, has decreased significantly.    1/15/2021 : Clinic visit with SNOW Donato, ETHAN, ANGELINE GREENE.  Hai presents to clinic today, again accompanied by his wife.  He states he is feeling well,denies fevers, chills, nausea, vomiting, cough or shortness of breath.  It is noted that there is fluid collected around the wound, under VAC drape.  Patient denies any alarm from VAC.  Home health has been changing his dressing in between clinic visits.    1/22/2021 : Clinic visit with SNOW Donato FNP-BC, ANGELINE GREENE.  Hai presents to clinic today, again accompanied by his wife.  He has been feeling well, denies fevers, chills, nausea, vomiting, cough or shortness of breath.  He reports that his appetite is getting back to normal, and bowel movements less frequent and more formed.  VAC has been off since last visit due to periwound irritation.  Has improved somewhat, patient states very little itching or pain.  Decision made today to discontinue VAC altogether.  Patient is agreeable, and states he will send machine back to [facility].    1/29/2021 : Clinic visit with SNOW Donato FNP-BC, ANGELINE GREENE.  Hai continues to feel well, denies fevers, chills, nausea, vomiting, cough or shortness of breath.  His appetite is good, weight stable.  He plans to follow-up with Dr. Aquino in the next week or 2.      2/5/2021 : Clinic visit with NSOW Donato FNP-BC, ANGELINE GREENE.  Hai is feeling very well today, denies fevers, chills, nausea, vomiting, cough or shortness of breath.  He was seen by his surgeon  yesterday, and released to resume regular activity, does not need to see surgeon again.     2/12/2021: Clinic visit with SNOW Patton. Patient states that they are feeling well today.  Patient denies fever, chills, nausea, vomiting, lightheadedness, dizziness, shortness of breath and chest pain.  No debridement necessary in clinic today.  Patient's wound has 100% epithelialized.  Patient does have some periwound irritation with slightly denuded tissue.  Patient to return next week for evaluation of periwound skin and probable discharge from [facility].           REVIEW OF SYSTEMS:   Review of Systems   Constitutional: Negative for chills and fever.   Respiratory: Negative for cough and shortness of breath.    Cardiovascular: Negative for chest pain.   Gastrointestinal: Negative for constipation, diarrhea, nausea and vomiting.        Loose stools since ileostomy takedown, beginning to firm   Genitourinary: Negative for dysuria.   Musculoskeletal: Negative for joint pain.   Skin: Negative for rash.   Psychiatric/Behavioral: Negative for depression. The patient is not nervous/anxious.        PHYSICAL EXAMINATION:   /66   Pulse 73   Temp 36.5 °C (97.7 °F)   Resp 20   SpO2 97%     Physical Exam   Constitutional: He is oriented to person, place, and time and well-developed, well-nourished, and in no distress.   HENT:   Head: Normocephalic.   Eyes: Pupils are equal, round, and reactive to light.   Pulmonary/Chest: Effort normal.   Abdominal: He exhibits distension. There is no abdominal tenderness.   Abdomen slightly distended    Musculoskeletal:         General: Edema present. Normal range of motion.      Comments: Nonpitting edema bilateral lower extremities   Neurological: He is alert and oriented to person, place, and time.   Skin: Skin is warm.   Wound has epithelialized patient does have some periwound skin irritation and denuded tissue  Refer to wound flowsheet and photos   Psychiatric: Mood, memory,  affect and judgment normal.       WOUND ASSESSMENT            Negative Pressure Wound Therapy 12/23/20 Surgical Abdomen Lower Right (Active)           Wound 12/15/20 Incision Chest Right dermabond (Active)       Wound 12/15/20 Incision Abdomen Right 4x4 and tegaderm (Active)       Wound 12/30/20 RLQ abdomen (Active)   Wound Image   02/12/21 1030   Site Assessment Brown;Other (Comment) 02/12/21 1030   Periwound Assessment Scar tissue 02/12/21 1030   Margins Attached edges 02/12/21 1030   Drainage Amount None 02/12/21 1030   Drainage Description Serosanguineous 02/05/21 1010   Treatments Cleansed;Site care 02/12/21 1030   Wound Cleansing Foam Cleanser/Washcloth 02/12/21 1030   Periwound Protectant Skin Protectant Wipes to Periwound;Barrier Paste 02/12/21 1030   Dressing Cleansing/Solutions Not Applicable 02/05/21 1010   Dressing Options Hydrofiber Silver Strip;Silicone Adhesive Foam 02/12/21 1030   Dressing Changed New 02/12/21 1030   Dressing Status Clean;Dry;Intact 01/22/21 0900   Non-staged Wound Description Full thickness 02/12/21 1030   Wound Length (cm) 0.7 cm 02/05/21 1010   Wound Width (cm) 1.5 cm 02/05/21 1010   Wound Depth (cm) 0.4 cm 01/22/21 0900   Wound Surface Area (cm^2) 1.05 cm^2 02/05/21 1010   Wound Volume (cm^3) 2.43 cm^3 01/22/21 0900   Post-Procedure Length (cm) 0.7 cm 02/05/21 1010   Post-Procedure Width (cm) 1.5 cm 02/05/21 1010   Post-Procedure Depth (cm) 0.4 cm 01/22/21 0900   Post-Procedure Surface Area (cm^2) 1.05 cm^2 02/05/21 1010   Post-Procedure Volume (cm^3) 2.43 cm^3 01/22/21 0900   Wound Healing % 76 01/22/21 0900   Wound Bed Granulation (%) 100 % 02/05/21 1010   Wound Bed Epithelium (%) 0 % 02/05/21 1010   Wound Bed Slough (%) 0 % 02/05/21 1010   Wound Bed Eschar (%) 0 % 02/05/21 1010   Tunneling (cm) 0 cm 02/12/21 1030   Tunneling Clock Position of Wound 3 01/08/21 1035   Undermining (cm) 0 cm 02/12/21 1030   Wound Odor None 02/12/21 1030   Exposed Structures None 02/12/21 1030             PROCEDURE: Patient assessed in clinic today excisional debridement not necessary.  Patient has some periwound skin irritation and denuded tissue we will have the patient come back 1 more week to monitor this area for resolution.  Wound bed has 100% epithelialized hopeful discharge following next clinical appointment.      Pertinent Labs and Diagnostics:    Labs:     A1c: No results found for: HBA1C       IMAGIN2020-CT of abdomen and pelvis with contrast  IMPRESSION:        1.  Presacral mildly enhancing fluid collection, appearance suggests abscess.  2.  Large quantity of stool in the cecum favors changes of constipation. Component of evolving [place]'s or ileus not excluded. Recommend radiographic follow-up to resolution  3.  Bladder wall thickening, consider changes of cystitis, correlate with urinalysis  4.  Bilateral fat-containing inguinal hernias  5.  Small layering left pleural effusion  6.  Fat-containing umbilical hernia  7.  Atherosclerosis and atherosclerotic coronary artery disease.      VASCULAR STUDIES: N/A    LAST  WOUND CULTURE:  DATE : None found in epic             ASSESSMENT AND PLAN:     1. Open wound of abdominal wall, subsequent encounter  Comments: Dehiscence of surgical wound, status post ileostomy takedown.  Patient developed abscess postoperatively.    2021: Resolved, periwound irritation/denuded tissue  -Patient to return to clinic weekly for assessment   -Home health to change dressing 1 time per week in between clinic visits.        Wound care: Zinc paste, Hydrofiber silver strip, silicone adhesive foam        2. Pain associated with wound    2021: Patient has had very little pain from his wound over the past few weeks  -2% viscous lidocaine applied topically to wound bed for approximately 5 minutes prior to debridement  -Patient tolerated procedure today with no complaints of discomfort.    3. History of colorectal cancer  Comments: Status post chemoradiation  therapy, LAR, and ileostomy reversal    2/12/2021: Has been seen by surgery and is not in need of further surgical follow-up.    PATIENT EDUCATION  - Importance of adequate nutrition for wound healing  -Advised to go to ER for any increased redness, swelling, drainage, or odor, or if patient develops fever, chills, nausea or vomiting.     >20 minutes spent reviewing the patient's medical records and prior wound care notes.        Please note that this note may have been created using voice recognition software. I have worked with technical experts from [employer] to optimize the interface.  I have made every reasonable attempt to correct obvious errors, but there may be errors of grammar and possibly content that I did not discover before finalizing the note.    N

## 2021-02-12 NOTE — PATIENT INSTRUCTIONS
-Keep your wound dressing clean, dry, and intact.    -Change your dressing every 3 to 4 days or if it becomes soiled, soaked, or falls off.    -Should you experience any significant changes in your wound(s), such as infection (redness, swelling, localized heat, increased pain, fever > 101 F, chills) or have any questions regarding your home care instructions, please contact the wound center at [phone]. If after hours, contact your primary care physician or go to the hospital emergency room.

## 2021-02-19 ENCOUNTER — OFFICE VISIT (OUTPATIENT)
Dept: WOUND CARE | Facility: MEDICAL CENTER | Age: 71
End: 2021-02-19
Attending: FAMILY MEDICINE
Payer: MEDICARE

## 2021-02-19 VITALS
RESPIRATION RATE: 16 BRPM | TEMPERATURE: 98.1 F | SYSTOLIC BLOOD PRESSURE: 120 MMHG | HEART RATE: 76 BPM | DIASTOLIC BLOOD PRESSURE: 70 MMHG | OXYGEN SATURATION: 95 %

## 2021-02-19 DIAGNOSIS — T14.8XXA PAIN ASSOCIATED WITH WOUND: ICD-10-CM

## 2021-02-19 DIAGNOSIS — S31.109D OPEN WOUND OF ABDOMEN, SUBSEQUENT ENCOUNTER: ICD-10-CM

## 2021-02-19 DIAGNOSIS — R52 PAIN ASSOCIATED WITH WOUND: ICD-10-CM

## 2021-02-19 DIAGNOSIS — Z85.048 HISTORY OF COLORECTAL CANCER: ICD-10-CM

## 2021-02-19 PROCEDURE — 99212 OFFICE O/P EST SF 10 MIN: CPT

## 2021-02-19 PROCEDURE — 99212 OFFICE O/P EST SF 10 MIN: CPT | Performed by: NURSE PRACTITIONER

## 2021-02-19 ASSESSMENT — ENCOUNTER SYMPTOMS
DIARRHEA: 0
FEVER: 0
CONSTIPATION: 0
COUGH: 0
SHORTNESS OF BREATH: 0
VOMITING: 0
CHILLS: 0
NAUSEA: 0
DEPRESSION: 0
NERVOUS/ANXIOUS: 0

## 2021-02-19 ASSESSMENT — PAIN SCALES - GENERAL: PAINLEVEL: NO PAIN

## 2021-02-19 NOTE — PROGRESS NOTES
Provider Encounter- Full Thickness wound    HISTORY OF PRESENT ILLNESS  Wound History:    START OF CARE IN CLINIC: 12/30/2020    REFERRING PROVIDER: Ángel Keenan M.D.     WOUND- Full Thickness Wound   LOCATION: Abdominal right lower quadrant   HISTORY: 70-year-old male with history of low anterior resection and loop ileostomy creation for treatment of colorectal cancer.  After completing chemo and radiation therapy, patient opted to have his ostomy reversed.  He underwent an ileostomy takedown on 12/15/2020 by Dr. Casey Aquino, and was discharged the following day.  3 days later, he presented to the emergency room with abdominal distention and pain.  Surgical site had apparently dehisced, VAC was placed.  CT revealed abdominal wall cellulitis, cecal constipation and presacral abscess.  A flexible sigmoidoscopy was done to check anastomosis, no evidence of stricture.  A CT-guided drain was placed on 12/23.  He was discharged home on 12/29 on p.o. Augmentin and Flagyl, referred to home health and [facility] for management of his wound.    Pertinent Medical History: Colorectal cancer status post chemoradiation therapy, hypertension    TOBACCO USE: He has never smoked or used smokeless tobacco    Patient's problem list, allergies, and current medications reviewed and updated in Epic    Interval History:  12/30/2020: Initial clinic visit with SNOW Donato.  Patient is accompanied by his wife today who provides much of wound history.  Patient states he is feeling well overall, denies fevers, chills, nausea, vomiting, cough or shortness of breath.  Home health is scheduled to come to his home tomorrow for initial evaluation.  Wife has multiple concerns, worried that home health will not be able to manage this wound appropriately.  She was assured that thorough instructions would be sent to home health agency.  I also arranged for [facility] rep, Belinda Connell RN, CWOCN, to be present for VAC dressing change on  Monday.      1/8/2021 : Clinic visit with SNOW Donato FNP-BC, ANGELINE GREENE.  Casey is again accompanied by his wife today.  He states he is feeling well,denies fevers, chills, nausea, vomiting, cough or shortness of breath.  He is eating well and bowel movements are returning to normal.  Home health has been changing his VAC, they are satisfied with wound progress.  The tract, noticed last clinic visit, has decreased significantly.    1/15/2021 : Clinic visit with SNOW Donato, ETHAN, ANGELINE GREENE.  Hai presents to clinic today, again accompanied by his wife.  He states he is feeling well,denies fevers, chills, nausea, vomiting, cough or shortness of breath.  It is noted that there is fluid collected around the wound, under VAC drape.  Patient denies any alarm from VAC.  Home health has been changing his dressing in between clinic visits.    1/22/2021 : Clinic visit with SNOW Donato FNP-BC, ANGELINE GREENE.  Hai presents to clinic today, again accompanied by his wife.  He has been feeling well, denies fevers, chills, nausea, vomiting, cough or shortness of breath.  He reports that his appetite is getting back to normal, and bowel movements less frequent and more formed.  VAC has been off since last visit due to periwound irritation.  Has improved somewhat, patient states very little itching or pain.  Decision made today to discontinue VAC altogether.  Patient is agreeable, and states he will send machine back to [facility].    1/29/2021 : Clinic visit with SNOW Donato FNP-BC, ANGELINE GREENE.  Hai continues to feel well, denies fevers, chills, nausea, vomiting, cough or shortness of breath.  His appetite is good, weight stable.  He plans to follow-up with Dr. Aquino in the next week or 2.      2/5/2021 : Clinic visit with SNOW Donato FNP-BC, ANGELINE GREENE.  Hai is feeling very well today, denies fevers, chills, nausea, vomiting, cough or shortness of breath.  He was seen by his surgeon  yesterday, and released to resume regular activity, does not need to see surgeon again.     2/12/2021: Clinic visit with SNOW Patton. Patient states that they are feeling well today.  Patient denies fever, chills, nausea, vomiting, lightheadedness, dizziness, shortness of breath and chest pain.  No debridement necessary in clinic today.  Patient's wound has 100% epithelialized.  Patient does have some periwound irritation with slightly denuded tissue.  Patient to return next week for evaluation of periwound skin and probable discharge from [facility].    2/19/2021 : Clinic visit with SNOW Donato, ETHAN, RAMONA, CFCHRISTIAN.  Hai states he is feeling well, denies fevers, chills, nausea, vomiting, cough or shortness of breath.  He has had no drainage from his wound over the past week, and appears to be fully reepithelialized.  Discharge from [facility].  Time spent today discussing remodeling of scar, monitoring, and indications for return to clinic.           REVIEW OF SYSTEMS:   Review of Systems   Constitutional: Negative for chills and fever.   Respiratory: Negative for cough and shortness of breath.    Cardiovascular: Negative for chest pain.   Gastrointestinal: Negative for constipation, diarrhea, nausea and vomiting.        Loose stools since ileostomy takedown, beginning to firm   Genitourinary: Negative for dysuria.   Musculoskeletal: Negative for joint pain.   Skin: Negative for rash.   Psychiatric/Behavioral: Negative for depression. The patient is not nervous/anxious.        PHYSICAL EXAMINATION:   /70   Pulse 76   Temp 36.7 °C (98.1 °F)   Resp 16   SpO2 95%     Physical Exam   Constitutional: He is oriented to person, place, and time and well-developed, well-nourished, and in no distress.   HENT:   Head: Normocephalic.   Eyes: Pupils are equal, round, and reactive to light.   Pulmonary/Chest: Effort normal.   Abdominal: Soft. He exhibits no distension. There is no abdominal tenderness.    Musculoskeletal:         General: Normal range of motion.      Comments: Nonpitting edema bilateral lower extremities   Neurological: He is alert and oriented to person, place, and time.   Skin: Skin is warm.   Abdominal wound resolved  Refer to wound flowsheet and photos   Psychiatric: Mood, memory, affect and judgment normal.       WOUND ASSESSMENT                           PROCEDURE:   Wound resolved, left open to air.      Pertinent Labs and Diagnostics:    Labs:     A1c: No results found for: HBA1C       IMAGIN2020-CT of abdomen and pelvis with contrast  IMPRESSION:        1.  Presacral mildly enhancing fluid collection, appearance suggests abscess.  2.  Large quantity of stool in the cecum favors changes of constipation. Component of evolving [place]'s or ileus not excluded. Recommend radiographic follow-up to resolution  3.  Bladder wall thickening, consider changes of cystitis, correlate with urinalysis  4.  Bilateral fat-containing inguinal hernias  5.  Small layering left pleural effusion  6.  Fat-containing umbilical hernia  7.  Atherosclerosis and atherosclerotic coronary artery disease.      VASCULAR STUDIES: N/A    LAST  WOUND CULTURE:  DATE : None found in epic             ASSESSMENT AND PLAN:     1. Open wound of abdominal wall, subsequent encounter  Comments: Dehiscence of surgical wound, status post ileostomy takedown.  Patient developed abscess postoperatively.    2021: Wound resolved  -Discharge from [facility]  -Patient to return to clinic ASAP if wound recurs, or if he/she develops new wounds  -Advised to moisturize and massage scar tissue.  Informed scar would remodel for the next 6 to 12 months    Wound care: Open to air        2. Pain associated with wound    2021: Complaints of pain or discomfort today    3. History of colorectal cancer  Comments: Status post chemoradiation therapy, LAR, and ileostomy reversal    2021: Has been seen by surgery and is not in need of  further surgical follow-up.    PATIENT EDUCATION  - Importance of adequate nutrition for wound healing  -Advised to go to ER for any increased redness, swelling, drainage, or odor, or if patient develops fever, chills, nausea or vomiting.     15 min spent  with patient, time spent counseling, coordinating care, reviewing records, discussing POC, educating patient regarding wound healing and progression.  This time was spent in excess to procedure time.         Please note that this note may have been created using voice recognition software. I have worked with technical experts from [employer] to optimize the interface.  I have made every reasonable attempt to correct obvious errors, but there may be errors of grammar and possibly content that I did not discover before finalizing the note.    N

## 2021-02-26 ENCOUNTER — APPOINTMENT (OUTPATIENT)
Dept: WOUND CARE | Facility: MEDICAL CENTER | Age: 71
End: 2021-02-26
Attending: FAMILY MEDICINE
Payer: MEDICARE

## 2021-07-19 ENCOUNTER — PRE-ADMISSION TESTING (OUTPATIENT)
Dept: ADMISSIONS | Facility: MEDICAL CENTER | Age: 71
End: 2021-07-19
Attending: SURGERY
Payer: MEDICARE

## 2021-07-19 DIAGNOSIS — Z01.812 PRE-OPERATIVE LABORATORY EXAMINATION: ICD-10-CM

## 2021-07-19 LAB
BASOPHILS # BLD AUTO: 0.4 % (ref 0–1.8)
BASOPHILS # BLD: 0.02 K/UL (ref 0–0.12)
EKG IMPRESSION: NORMAL
EOSINOPHIL # BLD AUTO: 0.13 K/UL (ref 0–0.51)
EOSINOPHIL NFR BLD: 2.4 % (ref 0–6.9)
ERYTHROCYTE [DISTWIDTH] IN BLOOD BY AUTOMATED COUNT: 54.9 FL (ref 35.9–50)
HCT VFR BLD AUTO: 38.7 % (ref 42–52)
HGB BLD-MCNC: 12.6 G/DL (ref 14–18)
IMM GRANULOCYTES # BLD AUTO: 0.02 K/UL (ref 0–0.11)
IMM GRANULOCYTES NFR BLD AUTO: 0.4 % (ref 0–0.9)
LYMPHOCYTES # BLD AUTO: 0.75 K/UL (ref 1–4.8)
LYMPHOCYTES NFR BLD: 13.7 % (ref 22–41)
MCH RBC QN AUTO: 35.4 PG (ref 27–33)
MCHC RBC AUTO-ENTMCNC: 32.6 G/DL (ref 33.7–35.3)
MCV RBC AUTO: 108.7 FL (ref 81.4–97.8)
MONOCYTES # BLD AUTO: 0.81 K/UL (ref 0–0.85)
MONOCYTES NFR BLD AUTO: 14.8 % (ref 0–13.4)
NEUTROPHILS # BLD AUTO: 3.73 K/UL (ref 1.82–7.42)
NEUTROPHILS NFR BLD: 68.3 % (ref 44–72)
NRBC # BLD AUTO: 0 K/UL
NRBC BLD-RTO: 0 /100 WBC
PLATELET # BLD AUTO: 151 K/UL (ref 164–446)
PMV BLD AUTO: 9.4 FL (ref 9–12.9)
RBC # BLD AUTO: 3.56 M/UL (ref 4.7–6.1)
WBC # BLD AUTO: 5.5 K/UL (ref 4.8–10.8)

## 2021-07-19 PROCEDURE — 93005 ELECTROCARDIOGRAM TRACING: CPT

## 2021-07-19 PROCEDURE — 93010 ELECTROCARDIOGRAM REPORT: CPT | Performed by: INTERNAL MEDICINE

## 2021-07-19 PROCEDURE — 36415 COLL VENOUS BLD VENIPUNCTURE: CPT

## 2021-07-19 PROCEDURE — 85025 COMPLETE CBC W/AUTO DIFF WBC: CPT

## 2021-07-19 RX ORDER — M-VIT,TX,IRON,MINS/CALC/FOLIC 27MG-0.4MG
1 TABLET ORAL DAILY
COMMUNITY
End: 2022-10-11

## 2021-07-19 ASSESSMENT — FIBROSIS 4 INDEX: FIB4 SCORE: 1.44

## 2021-07-26 NOTE — OR NURSING
COVID-19 Pre-surgery screenin. Do you have an undiagnosed respiratory illness or symptoms such as coughing or sneezing? No  a. Onset of Sx No  b. Acute vs. chronic respiratory illness No    2. Do you have an unexplained fever greater than 100.4 degrees Fahrenheit or 38 degrees Celsius?     No    3. Have you had direct exposure to a patient who tested positive for Covid-19?    No    4. Have you had any loss of your sense of taste or smell, N/V or sore throat? No    Patient has been informed of visitor policy and asked to wear a mask upon entering the hospital. Yes

## 2021-07-27 ENCOUNTER — ANESTHESIA (OUTPATIENT)
Dept: SURGERY | Facility: MEDICAL CENTER | Age: 71
End: 2021-07-27
Payer: MEDICARE

## 2021-07-27 ENCOUNTER — HOSPITAL ENCOUNTER (OUTPATIENT)
Facility: MEDICAL CENTER | Age: 71
End: 2021-07-27
Attending: SURGERY | Admitting: SURGERY
Payer: MEDICARE

## 2021-07-27 ENCOUNTER — ANESTHESIA EVENT (OUTPATIENT)
Dept: SURGERY | Facility: MEDICAL CENTER | Age: 71
End: 2021-07-27
Payer: MEDICARE

## 2021-07-27 VITALS
DIASTOLIC BLOOD PRESSURE: 79 MMHG | RESPIRATION RATE: 16 BRPM | HEIGHT: 69 IN | TEMPERATURE: 97.4 F | OXYGEN SATURATION: 93 % | HEART RATE: 81 BPM | SYSTOLIC BLOOD PRESSURE: 153 MMHG | WEIGHT: 214.51 LBS | BODY MASS INDEX: 31.77 KG/M2

## 2021-07-27 DIAGNOSIS — G89.18 ACUTE POST-OPERATIVE PAIN: ICD-10-CM

## 2021-07-27 PROBLEM — K43.2 RECURRENT VENTRAL HERNIA: Status: ACTIVE | Noted: 2021-07-27

## 2021-07-27 LAB
ANION GAP SERPL CALC-SCNC: 12 MMOL/L (ref 7–16)
BUN SERPL-MCNC: 20 MG/DL (ref 8–22)
CALCIUM SERPL-MCNC: 9.7 MG/DL (ref 8.5–10.5)
CHLORIDE SERPL-SCNC: 106 MMOL/L (ref 96–112)
CO2 SERPL-SCNC: 24 MMOL/L (ref 20–33)
CREAT SERPL-MCNC: 0.93 MG/DL (ref 0.5–1.4)
GLUCOSE SERPL-MCNC: 79 MG/DL (ref 65–99)
POTASSIUM SERPL-SCNC: 4.8 MMOL/L (ref 3.6–5.5)
SODIUM SERPL-SCNC: 142 MMOL/L (ref 135–145)

## 2021-07-27 PROCEDURE — 500868 HCHG NEEDLE, SURGI(VARES): Performed by: SURGERY

## 2021-07-27 PROCEDURE — 500002 HCHG ADHESIVE, DERMABOND: Performed by: SURGERY

## 2021-07-27 PROCEDURE — 160025 RECOVERY II MINUTES (STATS): Performed by: SURGERY

## 2021-07-27 PROCEDURE — 80048 BASIC METABOLIC PNL TOTAL CA: CPT

## 2021-07-27 PROCEDURE — A9270 NON-COVERED ITEM OR SERVICE: HCPCS | Performed by: ANESTHESIOLOGY

## 2021-07-27 PROCEDURE — 160002 HCHG RECOVERY MINUTES (STAT): Performed by: SURGERY

## 2021-07-27 PROCEDURE — 501838 HCHG SUTURE GENERAL: Performed by: SURGERY

## 2021-07-27 PROCEDURE — C1781 MESH (IMPLANTABLE): HCPCS | Performed by: SURGERY

## 2021-07-27 PROCEDURE — 502714 HCHG ROBOTIC SURGERY SERVICES: Performed by: SURGERY

## 2021-07-27 PROCEDURE — 160048 HCHG OR STATISTICAL LEVEL 1-5: Performed by: SURGERY

## 2021-07-27 PROCEDURE — 160031 HCHG SURGERY MINUTES - 1ST 30 MINS LEVEL 5: Performed by: SURGERY

## 2021-07-27 PROCEDURE — 700101 HCHG RX REV CODE 250: Performed by: ANESTHESIOLOGY

## 2021-07-27 PROCEDURE — 160047 HCHG PACU  - EA ADDL 30 MINS PHASE II: Performed by: SURGERY

## 2021-07-27 PROCEDURE — 700105 HCHG RX REV CODE 258: Performed by: SURGERY

## 2021-07-27 PROCEDURE — 700101 HCHG RX REV CODE 250: Performed by: SURGERY

## 2021-07-27 PROCEDURE — A9270 NON-COVERED ITEM OR SERVICE: HCPCS | Performed by: SURGERY

## 2021-07-27 PROCEDURE — 700111 HCHG RX REV CODE 636 W/ 250 OVERRIDE (IP): Performed by: ANESTHESIOLOGY

## 2021-07-27 PROCEDURE — 160042 HCHG SURGERY MINUTES - EA ADDL 1 MIN LEVEL 5: Performed by: SURGERY

## 2021-07-27 PROCEDURE — 160035 HCHG PACU - 1ST 60 MINS PHASE I: Performed by: SURGERY

## 2021-07-27 PROCEDURE — 501570 HCHG TROCAR, SEPARATOR: Performed by: SURGERY

## 2021-07-27 PROCEDURE — 160046 HCHG PACU - 1ST 60 MINS PHASE II: Performed by: SURGERY

## 2021-07-27 PROCEDURE — 160009 HCHG ANES TIME/MIN: Performed by: SURGERY

## 2021-07-27 PROCEDURE — 700102 HCHG RX REV CODE 250 W/ 637 OVERRIDE(OP): Performed by: ANESTHESIOLOGY

## 2021-07-27 PROCEDURE — 501583 HCHG TROCAR, THRD CAN&SEAL 5X100: Performed by: SURGERY

## 2021-07-27 DEVICE — MESH VENTRALIGHT ST 6 X 8 - W/ECHO (1EA/CA): Type: IMPLANTABLE DEVICE | Site: ABDOMEN | Status: FUNCTIONAL

## 2021-07-27 RX ORDER — DIPHENHYDRAMINE HYDROCHLORIDE 50 MG/ML
12.5 INJECTION INTRAMUSCULAR; INTRAVENOUS
Status: DISCONTINUED | OUTPATIENT
Start: 2021-07-27 | End: 2021-07-27 | Stop reason: HOSPADM

## 2021-07-27 RX ORDER — ONDANSETRON 2 MG/ML
INJECTION INTRAMUSCULAR; INTRAVENOUS PRN
Status: DISCONTINUED | OUTPATIENT
Start: 2021-07-27 | End: 2021-07-27 | Stop reason: SURG

## 2021-07-27 RX ORDER — MEPERIDINE HYDROCHLORIDE 25 MG/ML
12.5 INJECTION INTRAMUSCULAR; INTRAVENOUS; SUBCUTANEOUS
Status: DISCONTINUED | OUTPATIENT
Start: 2021-07-27 | End: 2021-07-27 | Stop reason: HOSPADM

## 2021-07-27 RX ORDER — LOSARTAN POTASSIUM 100 MG/1
100 TABLET ORAL EVERY EVENING
COMMUNITY
Start: 2021-06-15 | End: 2022-10-11

## 2021-07-27 RX ORDER — ACETAMINOPHEN 500 MG
1000 TABLET ORAL ONCE
Status: COMPLETED | OUTPATIENT
Start: 2021-07-27 | End: 2021-07-27

## 2021-07-27 RX ORDER — GABAPENTIN 300 MG/1
300 CAPSULE ORAL 3 TIMES DAILY
COMMUNITY

## 2021-07-27 RX ORDER — SODIUM CHLORIDE, SODIUM LACTATE, POTASSIUM CHLORIDE, CALCIUM CHLORIDE 600; 310; 30; 20 MG/100ML; MG/100ML; MG/100ML; MG/100ML
INJECTION, SOLUTION INTRAVENOUS CONTINUOUS
Status: ACTIVE | OUTPATIENT
Start: 2021-07-27 | End: 2021-07-27

## 2021-07-27 RX ORDER — OXYCODONE HCL 5 MG/5 ML
5 SOLUTION, ORAL ORAL
Status: COMPLETED | OUTPATIENT
Start: 2021-07-27 | End: 2021-07-27

## 2021-07-27 RX ORDER — CYANOCOBALAMIN 1000 UG/ML
1000 INJECTION, SOLUTION INTRAMUSCULAR; SUBCUTANEOUS
COMMUNITY
Start: 2021-04-23 | End: 2022-10-11

## 2021-07-27 RX ORDER — HALOPERIDOL 5 MG/ML
1 INJECTION INTRAMUSCULAR
Status: DISCONTINUED | OUTPATIENT
Start: 2021-07-27 | End: 2021-07-27 | Stop reason: HOSPADM

## 2021-07-27 RX ORDER — ONDANSETRON 2 MG/ML
4 INJECTION INTRAMUSCULAR; INTRAVENOUS
Status: COMPLETED | OUTPATIENT
Start: 2021-07-27 | End: 2021-07-27

## 2021-07-27 RX ORDER — LANOLIN ALCOHOL/MO/W.PET/CERES
325 CREAM (GRAM) TOPICAL DAILY
Status: ON HOLD | COMMUNITY
End: 2021-07-27

## 2021-07-27 RX ORDER — ROCURONIUM BROMIDE 10 MG/ML
INJECTION, SOLUTION INTRAVENOUS PRN
Status: DISCONTINUED | OUTPATIENT
Start: 2021-07-27 | End: 2021-07-27 | Stop reason: SURG

## 2021-07-27 RX ORDER — ROSUVASTATIN CALCIUM 20 MG/1
20 TABLET, COATED ORAL EVERY EVENING
COMMUNITY
Start: 2021-05-10

## 2021-07-27 RX ORDER — DEXAMETHASONE SODIUM PHOSPHATE 4 MG/ML
INJECTION, SOLUTION INTRA-ARTICULAR; INTRALESIONAL; INTRAMUSCULAR; INTRAVENOUS; SOFT TISSUE PRN
Status: DISCONTINUED | OUTPATIENT
Start: 2021-07-27 | End: 2021-07-27 | Stop reason: SURG

## 2021-07-27 RX ORDER — OXYCODONE HCL 10 MG/1
10 TABLET, FILM COATED, EXTENDED RELEASE ORAL ONCE
Status: COMPLETED | OUTPATIENT
Start: 2021-07-27 | End: 2021-07-27

## 2021-07-27 RX ORDER — OXYCODONE HCL 5 MG/5 ML
10 SOLUTION, ORAL ORAL
Status: COMPLETED | OUTPATIENT
Start: 2021-07-27 | End: 2021-07-27

## 2021-07-27 RX ORDER — BUPIVACAINE HYDROCHLORIDE 5 MG/ML
INJECTION, SOLUTION EPIDURAL; INTRACAUDAL
Status: DISCONTINUED | OUTPATIENT
Start: 2021-07-27 | End: 2021-07-27 | Stop reason: HOSPADM

## 2021-07-27 RX ORDER — CEFAZOLIN SODIUM 1 G/3ML
INJECTION, POWDER, FOR SOLUTION INTRAMUSCULAR; INTRAVENOUS PRN
Status: DISCONTINUED | OUTPATIENT
Start: 2021-07-27 | End: 2021-07-27 | Stop reason: SURG

## 2021-07-27 RX ORDER — OXYCODONE HYDROCHLORIDE AND ACETAMINOPHEN 5; 325 MG/1; MG/1
1 TABLET ORAL EVERY 6 HOURS PRN
Qty: 12 TABLET | Refills: 0 | Status: SHIPPED | OUTPATIENT
Start: 2021-07-27 | End: 2021-07-30

## 2021-07-27 RX ORDER — AMLODIPINE BESYLATE 10 MG/1
10 TABLET ORAL EVERY EVENING
Status: ON HOLD | COMMUNITY
End: 2021-07-27

## 2021-07-27 RX ADMIN — PROPOFOL 50 MG: 10 INJECTION, EMULSION INTRAVENOUS at 10:05

## 2021-07-27 RX ADMIN — ROCURONIUM BROMIDE 10 MG: 10 INJECTION, SOLUTION INTRAVENOUS at 10:04

## 2021-07-27 RX ADMIN — FENTANYL CITRATE 50 MCG: 50 INJECTION, SOLUTION INTRAMUSCULAR; INTRAVENOUS at 11:11

## 2021-07-27 RX ADMIN — ROCURONIUM BROMIDE 40 MG: 10 INJECTION, SOLUTION INTRAVENOUS at 09:13

## 2021-07-27 RX ADMIN — DEXAMETHASONE SODIUM PHOSPHATE 4 MG: 4 INJECTION, SOLUTION INTRA-ARTICULAR; INTRALESIONAL; INTRAMUSCULAR; INTRAVENOUS; SOFT TISSUE at 09:13

## 2021-07-27 RX ADMIN — FENTANYL CITRATE 100 MCG: 50 INJECTION, SOLUTION INTRAMUSCULAR; INTRAVENOUS at 09:13

## 2021-07-27 RX ADMIN — CEFAZOLIN 3 G: 330 INJECTION, POWDER, FOR SOLUTION INTRAMUSCULAR; INTRAVENOUS at 09:13

## 2021-07-27 RX ADMIN — ROCURONIUM BROMIDE 30 MG: 10 INJECTION, SOLUTION INTRAVENOUS at 10:15

## 2021-07-27 RX ADMIN — PROPOFOL 100 MG: 10 INJECTION, EMULSION INTRAVENOUS at 09:30

## 2021-07-27 RX ADMIN — PROPOFOL 150 MG: 10 INJECTION, EMULSION INTRAVENOUS at 09:13

## 2021-07-27 RX ADMIN — OXYCODONE HYDROCHLORIDE 10 MG: 5 SOLUTION ORAL at 11:03

## 2021-07-27 RX ADMIN — ONDANSETRON 4 MG: 2 INJECTION INTRAMUSCULAR; INTRAVENOUS at 08:53

## 2021-07-27 RX ADMIN — FENTANYL CITRATE 50 MCG: 50 INJECTION, SOLUTION INTRAMUSCULAR; INTRAVENOUS at 11:31

## 2021-07-27 RX ADMIN — FENTANYL CITRATE 50 MCG: 50 INJECTION, SOLUTION INTRAMUSCULAR; INTRAVENOUS at 11:02

## 2021-07-27 RX ADMIN — SODIUM CHLORIDE, POTASSIUM CHLORIDE, SODIUM LACTATE AND CALCIUM CHLORIDE: 600; 310; 30; 20 INJECTION, SOLUTION INTRAVENOUS at 08:33

## 2021-07-27 RX ADMIN — ONDANSETRON 4 MG: 2 INJECTION INTRAMUSCULAR; INTRAVENOUS at 13:54

## 2021-07-27 RX ADMIN — FENTANYL CITRATE 50 MCG: 50 INJECTION, SOLUTION INTRAMUSCULAR; INTRAVENOUS at 10:04

## 2021-07-27 RX ADMIN — FENTANYL CITRATE 50 MCG: 50 INJECTION, SOLUTION INTRAMUSCULAR; INTRAVENOUS at 09:50

## 2021-07-27 RX ADMIN — POVIDONE IODINE 15 ML: 100 SOLUTION TOPICAL at 08:31

## 2021-07-27 RX ADMIN — PROPOFOL 50 MG: 10 INJECTION, EMULSION INTRAVENOUS at 09:42

## 2021-07-27 RX ADMIN — FENTANYL CITRATE 50 MCG: 50 INJECTION, SOLUTION INTRAMUSCULAR; INTRAVENOUS at 11:21

## 2021-07-27 RX ADMIN — OXYCODONE HYDROCHLORIDE 10 MG: 10 TABLET, FILM COATED, EXTENDED RELEASE ORAL at 08:31

## 2021-07-27 RX ADMIN — ACETAMINOPHEN 1000 MG: 500 TABLET ORAL at 08:31

## 2021-07-27 RX ADMIN — FENTANYL CITRATE 50 MCG: 50 INJECTION, SOLUTION INTRAMUSCULAR; INTRAVENOUS at 13:54

## 2021-07-27 ASSESSMENT — PAIN DESCRIPTION - PAIN TYPE
TYPE: SURGICAL PAIN

## 2021-07-27 ASSESSMENT — PAIN SCALES - GENERAL: PAIN_LEVEL: 3

## 2021-07-27 ASSESSMENT — FIBROSIS 4 INDEX: FIB4 SCORE: 2.19

## 2021-07-27 NOTE — ANESTHESIA TIME REPORT
Anesthesia Start and Stop Event Times     Date Time Event    7/27/2021 0900 Anesthesia Start     1059 Anesthesia Stop        Responsible Staff  07/27/21    Name Role Begin End    Kiara Valle M.D. Anesth 0900 1059        Preop Diagnosis (Free Text):  Pre-op Diagnosis     INCISIONAL HERNIA        Preop Diagnosis (Codes):    Post op Diagnosis  Ventral hernia      Premium Reason  Non-Premium    Comments:

## 2021-07-27 NOTE — DISCHARGE INSTRUCTIONS
ACTIVITY: Rest and take it easy for the first 24 hours.  A responsible adult is recommended to remain with you during that time.  It is normal to feel sleepy.  We encourage you to not do anything that requires balance, judgment or coordination.    MILD FLU-LIKE SYMPTOMS ARE NORMAL. YOU MAY EXPERIENCE GENERALIZED MUSCLE ACHES, THROAT IRRITATION, HEADACHE AND/OR SOME NAUSEA.    FOR 24 HOURS DO NOT:  Drive, operate machinery or run household appliances.  Drink beer or alcoholic beverages.   Make important decisions or sign legal documents.    SPECIAL INSTRUCTIONS:     Hernia Repair Discharge Instructions:    1. DIET: After discharge from the hospital you may resume your normal preoperative diet without restrictions.    2. ACTIVITIES: After discharge from the hospital, you may resume full routine activities. Heavy lifting (over 10 pounds) and strenuous (jumping) activities will make your incision sore and should be avoided for one month after surgery. Routine activities of daily living such as walking, going up and down stairs are acceptable.    3. DRIVING: You may drive whenever you are no longer taking narcotic pain medications and are able to perform the activities needed to drive safely, i.e. turning, bending, twisting, etc.    4. BATHING: You may get the wound wet at any time after leaving the hospital. You may shower, but do not submerge in a bath or pool until seen for follow up.     5. BOWEL FUNCTION: The combination of pain medication and decreased activity level can cause constipation in otherwise normal patients. If you feel this is occurring, take a laxative (Milk of Magnesia, Ex-Lax, Senokot, Miralax, Magnesium Citrate) until the problem has resolved.    6. PAIN MEDICATION: You will be given a prescription for pain medication at discharge. Please take these as directed. It is advisable to take your medication around the clock for the first 24 to 48 hours. You may continue any non-steroidal  anti-inflammatory medications (NSAIDs) such as Advil in the post-operative period. These may and should be taken with your narcotic pain medication. It is important to remember not to take medications on an empty stomach as this may cause nausea. Do not consume alcohol while taking pain medication.    7. WHAT TO EXPECT: The incisions on your stomach are closed with absorbable sutures on the inside, they will not need to be removed.  There is a surgical glue on the skin surface.  It is ok to shower with this, but do not scrub it off until after you have seen your physician at your follow up appointment.     8. CALL IF YOU HAVE: (1) Fevers to more than 101F, (2) Unusual chest or leg pain, (3) Drainage or fluid from incision that may be foul smelling, increased tenderness or soreness at the wound or the wound edges are no longer together, redness or swelling at the incision site.  (4) Profound swelling at the incision site or in the genitals.    9. FOLLOW-UP: Call and schedule a follow up appointment in 2 weeks.    If you have any additional questions at all, please do not hesitate to call the office and speak to my medical assistant, myself, or the physician on call.    [address]  [place] [address]  [facility] [place]  [facility]  Colon and Rectal Surgery  [phone]      DIET: To avoid nausea, slowly advance diet as tolerated, avoiding spicy or greasy foods for the first day.  Add more substantial food to your diet according to your physician's instructions.  Babies can be fed formula or breast milk as soon as they are hungry.  INCREASE FLUIDS AND FIBER TO AVOID CONSTIPATION.    SURGICAL DRESSING/BATHING: You may shower. Pat incision sites dry.  Do not take a bath, use a hot tub, or immerse the incision sites in water.    FOLLOW-UP APPOINTMENT:  A follow-up appointment should be arranged with your doctor in 1-2 weeks; call to schedule.    You should CALL YOUR PHYSICIAN if you develop:  Fever  greater than 101 degrees F.  Pain not relieved by medication, or persistent nausea or vomiting.  Excessive bleeding (blood soaking through dressing) or unexpected drainage from the wound.  Extreme redness or swelling around the incision site, drainage of pus or foul smelling drainage.  Inability to urinate or empty your bladder within 8 hours.  Problems with breathing or chest pain.    You should call 911 if you develop problems with breathing or chest pain.  If you are unable to contact your doctor or surgical center, you should go to the nearest emergency room or urgent care center.    Physician's telephone #: [phone] Dr. Aquino    If any questions arise, call your doctor.  If your doctor is not available, please feel free to call the Surgical Center at [phone]. The Contact Center is open Monday through Friday 7AM to 5PM and may speak to a nurse at [phone], or toll free at [phone].     A registered nurse may call you a few days after your surgery to see how you are doing after your procedure.    MEDICATIONS: Resume taking daily medication.  Take prescribed pain medication with food.  If no medication is prescribed, you may take non-aspirin pain medication if needed.  PAIN MEDICATION CAN BE VERY CONSTIPATING.  Take a stool softener or laxative such as senokot, pericolace, or milk of magnesia if needed.    Prescription given for pain at [facility]/pharmacy #[id number] -   [address] [address] Phone:  [phone]  Fax:  [phone]  .      Last pain medication given at 11:03am.    If your physician has prescribed pain medication that includes Acetaminophen (Tylenol), do not take additional Acetaminophen (Tylenol) while taking the prescribed medication.    Depression / Suicide Risk    As you are discharged from this [facility] facility, it is important to learn how to keep safe from harming yourself.    Recognize the warning signs:  · Abrupt changes in personality, positive or  negative- including increase in energy   · Giving away possessions  · Change in eating patterns- significant weight changes-  positive or negative  · Change in sleeping patterns- unable to sleep or sleeping all the time   · Unwillingness or inability to communicate  · Depression  · Unusual sadness, discouragement and loneliness  · Talk of wanting to die  · Neglect of personal appearance   · Rebelliousness- reckless behavior  · Withdrawal from people/activities they love  · Confusion- inability to concentrate     If you or a loved one observes any of these behaviors or has concerns about self-harm, here's what you can do:  · Talk about it- your feelings and reasons for harming yourself  · Remove any means that you might use to hurt yourself (examples: pills, rope, extension cords, firearm)  · Get professional help from the community (Mental Health, Substance Abuse, psychological counseling)  · Do not be alone:Call your Safe Contact- someone whom you trust who will be there for you.  · Call your local CRISIS HOTLINE 613-3889 or 261-696-0756  · Call your local Children's Mobile Crisis Response Team [place] (048) 165-1434 or www.Memoright  · Call the toll free National Suicide Prevention Hotlines   · National Suicide Prevention Lifeline 308-727-GDVX (4245)  National Hope Line Network 800-SUICIDE (175-0462)

## 2021-07-27 NOTE — ANESTHESIA PREPROCEDURE EVALUATION
Relevant Problems   CARDIAC   (positive) Hypertension       Physical Exam    Airway   Mallampati: I  TM distance: >3 FB  Neck ROM: full       Cardiovascular - normal exam  Rhythm: regular  Rate: normal     Dental - normal exam           Pulmonary - normal exam  Breath sounds clear to auscultation     Abdominal   Abdomen: soft  Bowel sounds: normal   Neurological              Anesthesia Plan    ASA 2       Plan - general       Airway plan will be ETT        Plan Factors:   Patient was previously instructed to abstain from smoking on day of procedure.  Patient did not smoke on day of procedure.      Induction: intravenous    Postoperative Plan: Postoperative administration of opioids is intended.    Pertinent diagnostic labs and testing reviewed    Informed Consent:    Anesthetic plan and risks discussed with patient.    Use of blood products discussed with: whom consented to blood products.

## 2021-07-27 NOTE — OR NURSING
Report given to Leandra JO via SBAR reviewed orders and patient history, no questions at this time.  Pt alert and oriented, resp even and nonlabored, in NAD, pt has minimal pain and medicated , no  nausea at this time. Pt moves all ext, follows commands and verbalized understanding  of poc and discharge/admission. Family notified via text/phone patient is moving to phase II/room. Will con't to monitor until patient has transitioned.  Attempted to call wife x2 VM

## 2021-07-27 NOTE — ANESTHESIA POSTPROCEDURE EVALUATION
Patient: Casey Arroyo    Procedure Summary     Date: 07/27/21 Room / Location: [place] [address] / SURGERY [facility]    Anesthesia Start: 0900 Anesthesia Stop: 1059    Procedure: REPAIR, HERNIA, VENTRAL, ROBOT-ASSISTED, USING DA SHILPI XI - FOR INCISIONAL HERNIA WITH MESH. (Abdomen) Diagnosis: (INCISIONAL HERNIA x2)    Surgeons: Casey Aquino M.D. Responsible Provider: Kiara Valle M.D.    Anesthesia Type: general ASA Status: 2          Final Anesthesia Type: general  Last vitals  BP   Blood Pressure : 109/53    Temp   36.6 °C (97.8 °F)    Pulse   73   Resp   16    SpO2   90 %      Anesthesia Post Evaluation    Patient location during evaluation: bedside  Patient participation: complete - patient participated  Level of consciousness: awake  Pain score: 3    Airway patency: patent  Anesthetic complications: no  Cardiovascular status: adequate  Respiratory status: acceptable  Hydration status: acceptable    PONV: none          There were no known complications for this encounter.     Nurse Pain Score: 0 (NPRS)

## 2021-07-27 NOTE — OP REPORT
DATE OF OPERATION: 7/27/2021    PREOPERATIVE DIAGNOSIS:  Incisional hernia at ileostomy reversal site and umbilical incision  History of rectal cancer    POSTOPERATIVE DIAGNOSIS:    Incisional hernia without obstruction or incarceration  History of rectal cancer    PROCEDURE PERFORMED:  1.  Robotic-assisted laparoscopic incisional hernia repair with mesh      SURGEON: Casey Aquino M.D.    ASSISTANT: Trina ADAMSON  The indications for a surgical assistant in this surgery were indicated due to complexity of the procedure. Their role included aiding in incision, retraction, holding devices including camera for laparoscopic procedure, and closure of the wound.      ANESTHESIA:  General endotracheal anesthesia.    ASA CLASSIFICATION: I.    INDICATION:  The patient is a 70 y.o. male with history of rectal cancer status post resection now with incisional hernia at his ileostomy reversal site. He is taken to the operating room for robotic-assisted laparoscopic incisional hernia repair with mesh.    FINDINGS:  4 cm incisional hernia at the ileostomy reversal site and a small 1 cm hernia at the lateral umbilical incision repaired with a 15 x 20 composite mesh in a intraperitoneal onlay mesh fashion.    WOUND CLASSIFICATION: Class I, Clean.    SPECIMEN:  none.    ESTIMATED BLOOD LOSS:  10 mL.    PROCEDURE:    After informed witnessed consent was obtained, the patient was taken to the operating room and underwent general endotracheal anesthesia without incident.  Preoperative antibiotics were administered.  Bilateral lower sequential compression devices were placed.  The abdomen was prepped and draped in the usual standard sterile fashion.  A timeout was performed verifying the correct patient, procedure, site, positioning in availability of equipment prior to starting surgery.  I began by making a 5 mm incision in the left upper quadrant.using an Optiview technique and entered the abdominal cavity without injury  to underlying organs.  The abdomen was insufflated to 15 mmHg carbon dioxide and tolerated well.  Inspection revealed no contraindications to proceeding with repair.  Left lateral abdomen as well as left lower quadrant and a 5 mm port was exchanged for an 8 mm port.  I reduced all of the omental contents of the umbilical and incisional right lower quadrant incisional hernia.  I then closed both hernia defects with #1 running continuous locked suture.  I then reinforced the repair with a 15 x 20 composite mesh an intraperitoneal onlay mesh fashioned and sewn in with 2-0 stratafix sutures.  There was no injury to the underlying bowel.  The abdomen was desufflated.  Patient tolerated the procedure well.all sponges counts were correct ×2.  The patient was extubated and taken to the postanesthesia care unit in stable condition.  Casey Aquino M.D. PhD  [facility]  Colon and Rectal Surgery  (Office) [phone]  (cell) [phone]

## 2021-07-27 NOTE — ANESTHESIA PROCEDURE NOTES
Airway    Date/Time: 7/27/2021 9:02 AM  Performed by: Kiara Valle M.D.  Authorized by: Kiara Valle M.D.     Location:  OR  Urgency:  Elective  Difficult Airway: No    Indications for Airway Management:  Anesthesia      Spontaneous Ventilation: absent    Sedation Level:  Deep  Preoxygenated: Yes    Patient Position:  Sniffing  MILS Maintained Throughout: Yes    Mask Difficulty Assessment:  1 - vent by mask  Final Airway Type:  Endotracheal airway  Final Endotracheal Airway:  ETT  Cuffed: Yes    Technique Used for Successful ETT Placement:  Direct laryngoscopy    Insertion Site:  Oral  Blade Type:  Navi  Laryngoscope Blade/Videolaryngoscope Blade Size:  4  ETT Size (mm):  7.0  Measured from:  Lips  ETT to Lips (cm):  22  Placement Verified by: auscultation    Cormack-Lehane Classification:  Grade I - full view of glottis  Number of Attempts at Approach:  1  Ventilation Between Attempts:  BVM  Number of Other Approaches Attempted:  0

## 2021-07-27 NOTE — H&P
Surgery General History & Physical Note    Date  7/27/2021    Primary Care Physician  Isidro Maldonado M.D.    CC  Abdominal pain    HPI  This is a 70 y.o. male who presented with a history of rectal cancer status post ileostomy creation and closure now with an incisional hernia at his umbilicus and at the ileostomy closure site.  He is scheduled for robotic assisted laparoscopic incisional hernia repair with mesh and has not had any new physicians or emergency room visits or medication changes since his clinic visit.    Past Medical History:   Diagnosis Date   • Cancer (HCC) 02/04/2020    rectal cancer   • Cataract 2019    bilat iol   • Hypertension    • Snoring        Past Surgical History:   Procedure Laterality Date   • PB COLONOSCOPY,DIAGNOSTIC N/A 12/24/2020    Procedure: FLEXIBLE SIGMOIDOSCOPY;  Surgeon: Quinn Sawyer M.D.;  Location: [facility];  Service: Gastroenterology   • ILEOSTOMY  12/15/2020    Procedure: CREATION, ILEOSTOMY - FOR CLOSURE OF DIVERTING LOOP ILEOSTOMY;  Surgeon: Casey Aquino M.D.;  Location: [facility];  Service: General   • CATH PLACEMENT Right 12/15/2020    Procedure: INSERTION, CATHETER/Medi Port removal;  Surgeon: Casey Aquino M.D.;  Location: [facility];  Service: General   • OTHER ABDOMINAL SURGERY  12/2020    illeostomy take down   • OTHER  12/2020    port removal   • PB COLONOSCOPY,DIAGNOSTIC  2/19/2020    Procedure: COLONOSCOPY;  Surgeon: Carlyle Gonsalez M.D.;  Location: [facility];  Service: Gastroenterology   • COLONOSCOPY FLEX W/ENDO US  2/19/2020    Procedure: COLONOSCOPY, FLEXIBLE, WITH ENDOSCOPIC US-LOWER RADIAL;  Surgeon: Carlyle Gonsalez M.D.;  Location: [facility];  Service: Gastroenterology   • COLONOSCOPY  02/04/2020   • ILEOSTOMY  2020   • OTHER  2020    port placement R chest   • APPENDECTOMY  1966   • CATARACT EXTRACTION WITH IOL Bilateral        Current Facility-Administered Medications    Medication Dose Route Frequency Provider Last Rate Last Admin   • lidocaine (XYLOCAINE) 1 % injection 0.5 mL  0.5 mL Intradermal Once PRN Casey Aquino M.D.       • lactated ringers infusion   Intravenous Continuous Casey Aquino M.D. 10 mL/hr at 07/27/21 0833 New Bag at 07/27/21 0833       Social History     Socioeconomic History   • Marital status:      Spouse name: Not on file   • Number of children: Not on file   • Years of education: Not on file   • Highest education level: Not on file   Occupational History   • Not on file   Tobacco Use   • Smoking status: Never Smoker   • Smokeless tobacco: Never Used   Vaping Use   • Vaping Use: Never used   Substance and Sexual Activity   • Alcohol use: Yes     Comment: 3/day   • Drug use: Never   • Sexual activity: Not on file   Other Topics Concern   • Not on file   Social History Narrative   • Not on file     Social Determinants of Health     Financial Resource Strain:    • Difficulty of Paying Living Expenses:    Food Insecurity:    • Worried About Running Out of Food in the Last Year:    • Ran Out of Food in the Last Year:    Transportation Needs:    • Lack of Transportation (Medical):    • Lack of Transportation (Non-Medical):    Physical Activity:    • Days of Exercise per Week:    • Minutes of Exercise per Session:    Stress:    • Feeling of Stress :    Social Connections:    • Frequency of Communication with Friends and Family:    • Frequency of Social Gatherings with Friends and Family:    • Attends Taoist Services:    • Active Member of Clubs or Organizations:    • Attends Club or Organization Meetings:    • Marital Status:    Intimate Partner Violence:    • Fear of Current or Ex-Partner:    • Emotionally Abused:    • Physically Abused:    • Sexually Abused:        Family History   Problem Relation Age of Onset   • Cancer Mother    • Stroke Brother    • Arthritis Maternal Grandmother    • Heart Disease Maternal Grandfather         Allergies  Patient has no known allergies.    Review of Systems  Negative    Physical Exam  Vitals reviewed.   HENT:      Head: Normocephalic.      Nose: Nose normal.      Mouth/Throat:      Mouth: Mucous membranes are moist.   Eyes:      Extraocular Movements: Extraocular movements intact.   Cardiovascular:      Pulses: Normal pulses.   Pulmonary:      Effort: Pulmonary effort is normal.   Abdominal:      General: Abdomen is flat.      Comments: Reducible incisional hernia at the ileostomy closure site and umbilicus   Musculoskeletal:         General: Normal range of motion.      Cervical back: Normal range of motion.   Skin:     General: Skin is warm and dry.   Neurological:      Mental Status: He is alert and oriented to person, place, and time.   Psychiatric:         Mood and Affect: Mood normal.         Behavior: Behavior normal.         Thought Content: Thought content normal.         Judgment: Judgment normal.         Vital Signs  Blood Pressure : 141/68   Temperature: 36.4 °C (97.5 °F)   Pulse: 81   Respiration: 18   Pulse Oximetry: 97 %       Labs:                    Radiology:  No orders to display         Assessment/Plan:  Incisional hernia  Procedure(s):  REPAIR, HERNIA, VENTRAL, ROBOT-ASSISTED, USING DA SHILPI XI - FOR INCISIONAL HERNIA WITH MESH.   I reviewed all of the risks and indications of the procedure with the patient and his wife and they wish to proceed with surgery.  Casey Aquino MD PhD  [facility]  Colon and Rectal Surgeon  [phone]

## 2022-10-11 ENCOUNTER — PRE-ADMISSION TESTING (OUTPATIENT)
Dept: ADMISSIONS | Facility: MEDICAL CENTER | Age: 72
End: 2022-10-11
Attending: INTERNAL MEDICINE
Payer: MEDICARE

## 2022-10-11 DIAGNOSIS — Z01.812 PRE-OPERATIVE LABORATORY EXAMINATION: ICD-10-CM

## 2022-10-11 DIAGNOSIS — Z01.810 PRE-OPERATIVE CARDIOVASCULAR EXAMINATION: ICD-10-CM

## 2022-10-11 LAB
ALBUMIN SERPL BCP-MCNC: 4.2 G/DL (ref 3.2–4.9)
ALBUMIN/GLOB SERPL: 1.6 G/DL
ALP SERPL-CCNC: 79 U/L (ref 30–99)
ALT SERPL-CCNC: 51 U/L (ref 2–50)
ANION GAP SERPL CALC-SCNC: 9 MMOL/L (ref 7–16)
AST SERPL-CCNC: 58 U/L (ref 12–45)
BILIRUB SERPL-MCNC: 0.8 MG/DL (ref 0.1–1.5)
BUN SERPL-MCNC: 18 MG/DL (ref 8–22)
CALCIUM SERPL-MCNC: 9.8 MG/DL (ref 8.4–10.2)
CHLORIDE SERPL-SCNC: 103 MMOL/L (ref 96–112)
CO2 SERPL-SCNC: 27 MMOL/L (ref 20–33)
CREAT SERPL-MCNC: 0.92 MG/DL (ref 0.5–1.4)
EKG IMPRESSION: NORMAL
ERYTHROCYTE [DISTWIDTH] IN BLOOD BY AUTOMATED COUNT: 53 FL (ref 35.9–50)
GFR SERPLBLD CREATININE-BSD FMLA CKD-EPI: 87 ML/MIN/1.73 M 2
GLOBULIN SER CALC-MCNC: 2.7 G/DL (ref 1.9–3.5)
GLUCOSE SERPL-MCNC: 88 MG/DL (ref 65–99)
HCT VFR BLD AUTO: 42.2 % (ref 42–52)
HGB BLD-MCNC: 13.5 G/DL (ref 14–18)
MCH RBC QN AUTO: 35.2 PG (ref 27–33)
MCHC RBC AUTO-ENTMCNC: 32 G/DL (ref 33.7–35.3)
MCV RBC AUTO: 109.9 FL (ref 81.4–97.8)
PLATELET # BLD AUTO: 125 K/UL (ref 164–446)
PMV BLD AUTO: 10.6 FL (ref 9–12.9)
POTASSIUM SERPL-SCNC: 4 MMOL/L (ref 3.6–5.5)
PROT SERPL-MCNC: 6.9 G/DL (ref 6–8.2)
RBC # BLD AUTO: 3.84 M/UL (ref 4.7–6.1)
SODIUM SERPL-SCNC: 139 MMOL/L (ref 135–145)
WBC # BLD AUTO: 4.5 K/UL (ref 4.8–10.8)

## 2022-10-11 PROCEDURE — 85027 COMPLETE CBC AUTOMATED: CPT

## 2022-10-11 PROCEDURE — 80053 COMPREHEN METABOLIC PANEL: CPT

## 2022-10-11 PROCEDURE — 93010 ELECTROCARDIOGRAM REPORT: CPT | Performed by: INTERNAL MEDICINE

## 2022-10-11 PROCEDURE — 36415 COLL VENOUS BLD VENIPUNCTURE: CPT

## 2022-10-11 PROCEDURE — 93005 ELECTROCARDIOGRAM TRACING: CPT

## 2022-10-11 RX ORDER — FUROSEMIDE 40 MG/1
40 TABLET ORAL
COMMUNITY
Start: 2022-07-13

## 2022-10-11 RX ORDER — CHOLESTYRAMINE 4 G/9G
1 POWDER, FOR SUSPENSION ORAL 3 TIMES DAILY PRN
COMMUNITY

## 2022-10-11 RX ORDER — LANOLIN ALCOHOL/MO/W.PET/CERES
1000 CREAM (GRAM) TOPICAL DAILY
COMMUNITY

## 2022-10-11 RX ORDER — LOSARTAN POTASSIUM 25 MG/1
25 TABLET ORAL
COMMUNITY
Start: 2022-07-30

## 2022-10-11 ASSESSMENT — FIBROSIS 4 INDEX: FIB4 SCORE: 2.22

## 2022-10-11 NOTE — OR NURSING
Dr Santos reviewed patients medical history. Based upon information available, Dr Santos indicates:     Ok to proceed with procedure on 10/19/22.

## 2022-10-11 NOTE — PREPROCEDURE INSTRUCTIONS
"Pre-admit appointment completed. \"Preparing for your procedure\" sheet given to pt along with verbal and written instructions. Pt instructed to continue regularly prescribed medications through the day before surgery. Pt instructed to take the following medications the day of surgery with a sip of water, per anesthesia protocol;  neurontin.    MET's= >4.    Dr Santos notified via email of procedure due to hx of CHF (pt reports he has not had to use any lasix for any edema since last year), HTN, cancer, and other co morbidities.   "

## 2022-10-19 ENCOUNTER — HOSPITAL ENCOUNTER (OUTPATIENT)
Facility: MEDICAL CENTER | Age: 72
End: 2022-10-19
Attending: INTERNAL MEDICINE | Admitting: INTERNAL MEDICINE
Payer: MEDICARE

## 2022-10-19 ENCOUNTER — ANESTHESIA EVENT (OUTPATIENT)
Dept: SURGERY | Facility: MEDICAL CENTER | Age: 72
End: 2022-10-19
Payer: MEDICARE

## 2022-10-19 ENCOUNTER — ANESTHESIA (OUTPATIENT)
Dept: SURGERY | Facility: MEDICAL CENTER | Age: 72
End: 2022-10-19
Payer: MEDICARE

## 2022-10-19 VITALS
TEMPERATURE: 97.9 F | OXYGEN SATURATION: 97 % | DIASTOLIC BLOOD PRESSURE: 75 MMHG | WEIGHT: 191.03 LBS | BODY MASS INDEX: 28.29 KG/M2 | HEIGHT: 69 IN | HEART RATE: 56 BPM | RESPIRATION RATE: 18 BRPM | SYSTOLIC BLOOD PRESSURE: 159 MMHG

## 2022-10-19 PROCEDURE — 160048 HCHG OR STATISTICAL LEVEL 1-5: Performed by: INTERNAL MEDICINE

## 2022-10-19 PROCEDURE — 700105 HCHG RX REV CODE 258: Performed by: INTERNAL MEDICINE

## 2022-10-19 PROCEDURE — 700111 HCHG RX REV CODE 636 W/ 250 OVERRIDE (IP): Performed by: ANESTHESIOLOGY

## 2022-10-19 PROCEDURE — 160025 RECOVERY II MINUTES (STATS): Performed by: INTERNAL MEDICINE

## 2022-10-19 PROCEDURE — 160046 HCHG PACU - 1ST 60 MINS PHASE II: Performed by: INTERNAL MEDICINE

## 2022-10-19 PROCEDURE — 00811 ANES LWR INTST NDSC NOS: CPT | Performed by: ANESTHESIOLOGY

## 2022-10-19 PROCEDURE — 160202 HCHG ENDO MINUTES - 1ST 30 MINS LEVEL 3: Performed by: INTERNAL MEDICINE

## 2022-10-19 PROCEDURE — 160002 HCHG RECOVERY MINUTES (STAT): Performed by: INTERNAL MEDICINE

## 2022-10-19 PROCEDURE — 160035 HCHG PACU - 1ST 60 MINS PHASE I: Performed by: INTERNAL MEDICINE

## 2022-10-19 PROCEDURE — 99100 ANES PT EXTEME AGE<1 YR&>70: CPT | Performed by: ANESTHESIOLOGY

## 2022-10-19 PROCEDURE — 160009 HCHG ANES TIME/MIN: Performed by: INTERNAL MEDICINE

## 2022-10-19 RX ORDER — LABETALOL HYDROCHLORIDE 5 MG/ML
5 INJECTION, SOLUTION INTRAVENOUS
Status: DISCONTINUED | OUTPATIENT
Start: 2022-10-19 | End: 2022-10-19 | Stop reason: HOSPADM

## 2022-10-19 RX ORDER — ONDANSETRON 2 MG/ML
4 INJECTION INTRAMUSCULAR; INTRAVENOUS
Status: DISCONTINUED | OUTPATIENT
Start: 2022-10-19 | End: 2022-10-19 | Stop reason: HOSPADM

## 2022-10-19 RX ORDER — DIPHENHYDRAMINE HYDROCHLORIDE 50 MG/ML
12.5 INJECTION INTRAMUSCULAR; INTRAVENOUS
Status: DISCONTINUED | OUTPATIENT
Start: 2022-10-19 | End: 2022-10-19 | Stop reason: HOSPADM

## 2022-10-19 RX ORDER — OXYCODONE HCL 5 MG/5 ML
5 SOLUTION, ORAL ORAL
Status: DISCONTINUED | OUTPATIENT
Start: 2022-10-19 | End: 2022-10-19 | Stop reason: HOSPADM

## 2022-10-19 RX ORDER — OXYCODONE HCL 5 MG/5 ML
10 SOLUTION, ORAL ORAL
Status: DISCONTINUED | OUTPATIENT
Start: 2022-10-19 | End: 2022-10-19 | Stop reason: HOSPADM

## 2022-10-19 RX ORDER — SODIUM CHLORIDE, SODIUM LACTATE, POTASSIUM CHLORIDE, CALCIUM CHLORIDE 600; 310; 30; 20 MG/100ML; MG/100ML; MG/100ML; MG/100ML
INJECTION, SOLUTION INTRAVENOUS CONTINUOUS
Status: DISCONTINUED | OUTPATIENT
Start: 2022-10-19 | End: 2022-10-19 | Stop reason: HOSPADM

## 2022-10-19 RX ORDER — HALOPERIDOL 5 MG/ML
1 INJECTION INTRAMUSCULAR
Status: DISCONTINUED | OUTPATIENT
Start: 2022-10-19 | End: 2022-10-19 | Stop reason: HOSPADM

## 2022-10-19 RX ORDER — ACETAMINOPHEN 500 MG
1000 TABLET ORAL EVERY 4 HOURS PRN
Status: DISCONTINUED | OUTPATIENT
Start: 2022-10-19 | End: 2022-10-19 | Stop reason: HOSPADM

## 2022-10-19 RX ADMIN — PROPOFOL 100 MG: 10 INJECTION, EMULSION INTRAVENOUS at 13:57

## 2022-10-19 RX ADMIN — SODIUM CHLORIDE, POTASSIUM CHLORIDE, SODIUM LACTATE AND CALCIUM CHLORIDE: 600; 310; 30; 20 INJECTION, SOLUTION INTRAVENOUS at 13:52

## 2022-10-19 RX ADMIN — PROPOFOL 100 MG: 10 INJECTION, EMULSION INTRAVENOUS at 13:58

## 2022-10-19 RX ADMIN — PROPOFOL 50 MG: 10 INJECTION, EMULSION INTRAVENOUS at 14:02

## 2022-10-19 RX ADMIN — PROPOFOL 50 MG: 10 INJECTION, EMULSION INTRAVENOUS at 14:05

## 2022-10-19 ASSESSMENT — PAIN DESCRIPTION - PAIN TYPE: TYPE: ACUTE PAIN

## 2022-10-19 ASSESSMENT — FIBROSIS 4 INDEX: FIB4 SCORE: 4.74

## 2022-10-19 ASSESSMENT — PAIN SCALES - GENERAL: PAIN_LEVEL: 0

## 2022-10-19 NOTE — DISCHARGE INSTRUCTIONS
What to Expect Post Anesthesia    Rest and take it easy for the first 24 hours.  A responsible adult is recommended to remain with you during that time.  It is normal to feel sleepy.  We encourage you to not do anything that requires balance, judgment or coordination.    FOR 24 HOURS DO NOT:  Drive, operate machinery or run household appliances.  Drink beer or alcoholic beverages.  Make important decisions or sign legal documents.    To avoid nausea, slowly advance diet as tolerated, avoiding spicy or greasy foods for the first day.  Add more substantial food to your diet according to your provider's instructions.  Babies can be fed formula or breast milk as soon as they are hungry.  INCREASE FLUIDS AND FIBER TO AVOID CONSTIPATION.    MILD FLU-LIKE SYMPTOMS ARE NORMAL.  YOU MAY EXPERIENCE GENERALIZED MUSCLE ACHES, THROAT IRRITATION, HEADACHE AND/OR SOME NAUSEA.    ENDOSCOPY HOME CARE INSTRUCTIONS      COLONOSCOPY OR FLEXIBLE SIGMOIDOSCOPY  1. If you received a barium enema, take a mild laxative such as dulcolax, Sushila's M.O., or Milk of Magnesia to clean out the barium.  2. Drink plenty of fluids. Eat a diet high in fiber (whole-grain breads, fresh fruit and vegetables).  3. You may notice a few drops of blood with your first bowel movement. If you develop any large amount of bleeding, black stools, a fever, or abdominal pain, call your doctor right away.  4. Call your doctor for test results in  .  5. Don't drive or drink alcohol for 24 hours. The medication you received will make you too drowsy.     7. Additional instructions:   8. Prescriptions:     You should call 911 if you develop problems with breathing or chest pain.  If any questions arise, call your doctor. If your doctor is not available, please feel free to call [phone]. You can also call the [facility] HOTLINE open 24 hours/day, 7 days/week and speak to a nurse at [phone], or toll free [phone].    Dr. Hughes  [facility]  [place]  [place]  [phone]     Flexible sigmoidoscopy     EUS lower     Indication:        History of rectal cancer treated with LAR, chemotherapy and radiation therapy now for surveillance     Sedation:         MAC (Dr. Jimenez)     Findings:              Flexible sigmoidoscopy                          ELIEL                                      No masses                                      Firm surrounding distal rectal tissue consistent with history of radiation therapy                          Sigmoid colon                                      Single diverticulum seen                                      Unremarkable mucosa                          Rectum                                      Narrow and tubular consistent with prior radiation therapy                                      Mild friability                                      No evidence of tumor recurrence                                      Anastomosis at 10 cm                 EUS lower                          Iliac vessels                                      No adenopathy                          Perisigmoid space                                      No adenopathy                          Perirectal space                                      No adenopathy                                      Generous size prostate and seminal vesicles                          Presacral space                                      Mild benign appearing tissue thickening     Plan: Continue surveillance with MRI versus EUS every 6 to 12 months for 3 years total

## 2022-10-19 NOTE — OR SURGEON
Flexible sigmoidoscopy and endoscopic ultrasound lower operative note    PreOp Diagnosis: History of rectal cancer now for surveillance      PostOp Diagnosis: Same      Procedure(s):  ENDOSCOPIC ULTRASOUND LOWER, HISTORY TREATED RECTAL CANCER NOW FOR SURVEILLANCE FLEX SIGMOIDOSCOPY, RADIAL ENDOSCOPIC ULTRASOUND - Wound Class: Clean Contaminated    Surgeon(s):  Mat Hughes M.D.    Anesthesiologist/Type of Anesthesia:  Anesthesiologist: Raimundo Jimenez M.D./MAC    Surgical Staff:  Circulator: Charles Duggan R.N.  Endoscopy Technician: Renaldo Stevens; Surekha Bell; Yamilex Cohen    Specimens removed if any:  * No specimens in log *    Dr. Hughes  [facility]  [facility]  [phone]    Flexible sigmoidoscopy    EUS lower    Indication: History of rectal cancer treated with LAR, chemotherapy and radiation therapy now for surveillance    Sedation: MAC (Dr. Jimenez)    Findings:   Flexible sigmoidoscopy    ELIEL     No masses     Firm surrounding distal rectal tissue consistent with history of radiation therapy    Sigmoid colon     Single diverticulum seen     Unremarkable mucosa    Rectum     Narrow and tubular consistent with prior radiation therapy     Mild friability     No evidence of tumor recurrence     Anastomosis at 10 cm     EUS lower    Iliac vessels     No adenopathy    Perisigmoid space     No adenopathy    Perirectal space     No adenopathy     Generous size prostate and seminal vesicles    Presacral space     Mild benign appearing tissue thickening    Plan:  Continue surveillance with MRI versus EUS every 6 to 12 months for 3 years total      Procedure detail:    Prior to procedure, informed consent was obtained.  Risks, benefits, alternatives, including but not limited to risk of bleeding, infection, perforation, adverse reaction to sedating medicine, failure to identify pathology and death were explained to the patient who accepted all risks.    Patient was prepped in the left  lateral position and IV propofol sedation was provided by anesthesia.    Digital rectal exam demonstrated firm tissue in the perirectal space consistent with history of previous radiation therapy.  Otherwise unremarkable.    Flexible sigmoidoscopy  Scope tip of the Olympus flexible gastroscope was introduced into the anus and passed from the anus through to the proximal sigmoid colon.  Air was insufflated and the scope slowly withdrawn visualizing the mucosa in a methodical 360 degree manner.  1 diverticulum was seen in the sigmoid: Otherwise the mucosa was unremarkable.  At 10 cm from the anus a anastomosis was encountered.  The tissue distal to this was tubular and somewhat firm making retroflex slightly more difficult.  Retroflexed view of the anus was unremarkable.  Scope was gently straightened and the rectum was visualized.  There was an anastomosis at 10 cm and distal to this was tubular and slightly friable tissue with a benign appearance.  The gastroscope was withdrawn and we proceeded with endoscopic ultrasound.    EUS  The flexible radial echoendoscope was passed through to the sigmoid colon without difficulty.  The perisigmoid space was well visualized and there was no adenopathy.  The iliac vessels were identified and there was no adenopathy.  The scope was slowly withdrawn to visualize the perirectal space.  The rectal wall had a mild thickening to it consistent with previous radiation therapy but this was symmetric and consistent without any suspicious features.  The perirectal space was free of adenopathy.  The prostate and seminal vesicles were generous in size but benign in appearance.  The presacral space showed thickening of tissue consistent with scar tissue but no suspicion of invasive tumor.  Multiple passes were made with the same findings.  The procedure was deemed complete.  Scope was withdrawn.  Air and liquid were suctioned.  Patient tolerated the procedure well and was sent to recovery  without immediate complications.    10/19/2022 2:12 PM Mat Hughes M.D.

## 2022-10-19 NOTE — ANESTHESIA TIME REPORT
Anesthesia Start and Stop Event Times     Date Time Event    10/19/2022 1341 Ready for Procedure     1352 Anesthesia Start     1415 Anesthesia Stop        Responsible Staff  10/19/22    Name Role Begin End    Raimundo Jimenez M.D. Anesth 1352 1415        Overtime Reason:  no overtime (within assigned shift)    Comments:

## 2022-10-19 NOTE — ANESTHESIA PREPROCEDURE EVALUATION
Case: [id number] Date/Time: 10/19/22 1345    Procedures:       ENDOSCOPIC ULTRASOUND LOWER, HISTORY TREATED RECTAL CANCER NOW FOR SURVEILLANCE FLEX SIGMOIDOSCOPY, RADIAL ENDOSCOPIC ULTRASOUND      EGD, WITH ENDOSCOPIC US      SIGMOIDOSCOPY, FLEXIBLE    Anesthesia type: MAC    Pre-op diagnosis: MALIGNANT NEOPLASM OF RECTUM    Location:  ENDOSCOPIC ULTRASOUND ROOM / SURGERY [facility]    Surgeons: Mat Hughes M.D.          Relevant Problems   CARDIAC   (positive) Hypertension       Physical Exam    Airway   Mallampati: II  TM distance: >3 FB  Neck ROM: full       Cardiovascular - normal exam  Rhythm: regular  Rate: normal  (-) murmur     Dental - normal exam           Pulmonary - normal exam  Breath sounds clear to auscultation     Abdominal    Neurological - normal exam                 Anesthesia Plan    ASA 3       Plan - general       Airway plan will be natural airway                    Informed Consent:        chf

## 2022-10-19 NOTE — ANESTHESIA POSTPROCEDURE EVALUATION
Patient: Casey Arroyo    Procedure Summary     Date: 10/19/22 Room / Location:  ENDOSCOPIC ULTRASOUND ROOM / SURGERY [facility]    Anesthesia Start: 1352 Anesthesia Stop: 1415    Procedure: ENDOSCOPIC ULTRASOUND LOWER, HISTORY TREATED RECTAL CANCER NOW FOR SURVEILLANCE FLEX SIGMOIDOSCOPY, RADIAL ENDOSCOPIC ULTRASOUND Diagnosis:       Malignant neoplasm of rectum      (MALIGNANT NEOPLASM OF RECTUM)    Surgeons: Mat Hughes M.D. Responsible Provider: Raimundo Jimenez M.D.    Anesthesia Type: general ASA Status: 3          Final Anesthesia Type: general  Last vitals  BP   Blood Pressure : (!) 148/75    Temp   36.1 °C (96.9 °F)    Pulse   65   Resp   18    SpO2   97 %      Anesthesia Post Evaluation    Patient location during evaluation: PACU  Patient participation: complete - patient participated  Level of consciousness: awake and alert  Pain score: 0    Airway patency: patent  Anesthetic complications: no  Cardiovascular status: hemodynamically stable  Respiratory status: acceptable  Hydration status: euvolemic    PONV: none          No notable events documented.     Nurse Pain Score: 0 (NPRS)

## 2022-10-19 NOTE — OR NURSING
1414: To PACU post ENDOSCOPIC ULTRASOUND LOWER, HISTORY TREATED RECTAL CANCER NOW FOR SURVEILLANCE FLEX SIGMOIDOSCOPY, RADIAL ENDOSCOPIC ULTRASOUND. Pt is extubated, breathing is spontaneous and unlabored.    1420: Pt more awake. Denies pain or nausea.    1450: Meets criteria for stage ll.

## 2022-11-08 ENCOUNTER — PATIENT MESSAGE (OUTPATIENT)
Dept: HEALTH INFORMATION MANAGEMENT | Facility: OTHER | Age: 72
End: 2022-11-08

## 2023-11-29 ENCOUNTER — PATIENT MESSAGE (OUTPATIENT)
Dept: HEALTH INFORMATION MANAGEMENT | Facility: OTHER | Age: 73
End: 2023-11-29

## 2025-03-04 ENCOUNTER — HOSPITAL ENCOUNTER (OUTPATIENT)
Facility: MEDICAL CENTER | Age: 75
End: 2025-03-04
Attending: STUDENT IN AN ORGANIZED HEALTH CARE EDUCATION/TRAINING PROGRAM
Payer: MEDICARE

## 2025-03-04 LAB
APPEARANCE UR: CLEAR
BILIRUB UR QL STRIP.AUTO: NEGATIVE
COLOR UR: YELLOW
GLUCOSE UR STRIP.AUTO-MCNC: NEGATIVE MG/DL
KETONES UR STRIP.AUTO-MCNC: ABNORMAL MG/DL
LEUKOCYTE ESTERASE UR QL STRIP.AUTO: NEGATIVE
MICRO URNS: ABNORMAL
NITRITE UR QL STRIP.AUTO: NEGATIVE
PH UR STRIP.AUTO: 5.5 [PH] (ref 5–8)
PROT UR QL STRIP: NEGATIVE MG/DL
RBC UR QL AUTO: NEGATIVE
SP GR UR STRIP.AUTO: 1.02
UROBILINOGEN UR STRIP.AUTO-MCNC: 0.2 EU/DL

## 2025-03-04 PROCEDURE — 81003 URINALYSIS AUTO W/O SCOPE: CPT

## (undated) DEVICE — NEEDLE DRIVER MEGA SUTURECUT DA VINCI 15X'S REUSABLE

## (undated) DEVICE — REDUCER XI STAPLER 12MM TO 8MM (6EA/BX)

## (undated) DEVICE — SUTURE 3-0 CHROMIC GUT SH 27 (36PK/BX)"

## (undated) DEVICE — PACK MAJOR BASIN - (2EA/CA)

## (undated) DEVICE — NEPTUNE 4 PORT MANIFOLD - (20/PK)

## (undated) DEVICE — GOWN WARMING STANDARD FLEX - (30/CA)

## (undated) DEVICE — SYRINGE 12 CC LUER TIP - (80/BX) OBSOLETE ITEM

## (undated) DEVICE — DERMABOND ADVANCED - (12EA/BX)

## (undated) DEVICE — SET EXTENSION WITH 2 PORTS (48EA/CA) ***PART #2C8610 IS A SUBSTITUTE*****

## (undated) DEVICE — CHLORAPREP 26 ML APPLICATOR - ORANGE TINT(25/CA)

## (undated) DEVICE — SUCTION INSTRUMENT YANKAUER BULBOUS TIP W/O VENT (50EA/CA)

## (undated) DEVICE — GLOVE BIOGEL INDICATOR SZ 7SURGICAL PF LTX - (50/BX 4BX/CA)

## (undated) DEVICE — CANNULA W/SEAL 5X100 Z-THRE - ADED KII (12/BX)

## (undated) DEVICE — CATHETER IV SAFETY 20 GA X 1-1/4 (50/BX)

## (undated) DEVICE — TROCAR 5X100 NON BLADED Z-TH - READ KII (6/BX)

## (undated) DEVICE — ELECTRODE DUAL RETURN W/ CORD - (50/PK)

## (undated) DEVICE — SEAL CANNULA STAPLER 12 MM (10EA/BX)

## (undated) DEVICE — SUTURE 2-0 VICRYL PLUS CT-1 36 (36PK/BX)"

## (undated) DEVICE — MASK ANESTHESIA ADULT  - (100/CA)

## (undated) DEVICE — SUTURE 0 COATED VICRYL 6-18IN - (12PK/BX)

## (undated) DEVICE — KIT ANESTHESIA W/CIRCUIT & 3/LT BAG W/FILTER (20EA/CA)

## (undated) DEVICE — SUTURE 3-0 VICRYL PLUS SH - 27 INCH (36/BX)

## (undated) DEVICE — MASK WITH FACE SHIELD (25/BX 4BX/CA)

## (undated) DEVICE — GOWN SURGEONS X-LARGE - DISP. (30/CA)

## (undated) DEVICE — ROBOTIC SURGERY SERVICES

## (undated) DEVICE — HEAD HOLDER JUNIOR/ADULT

## (undated) DEVICE — TOWELS CLOTH SURGICAL - (4/PK 20PK/CA)

## (undated) DEVICE — TOWEL STOP TIMEOUT SAFETY FLAG (40EA/CA)

## (undated) DEVICE — Device

## (undated) DEVICE — SPONGE GAUZESTER 4 X 4 4PLY - (128PK/CA)

## (undated) DEVICE — SUTURE 1 STRATAFIX SYMMETRIC PDS CTX 45CM (12EA/BX)

## (undated) DEVICE — ELECTRODE 850 FOAM ADHESIVE - HYDROGEL RADIOTRNSPRNT (50/PK)

## (undated) DEVICE — PENCIL ELECTSURG 10FT HLSTR - WITH BLADE (50EA/CA)

## (undated) DEVICE — PROTECTOR ULNA NERVE - (36PR/CA)

## (undated) DEVICE — BOVIE BLADE COATED - (50/PK)

## (undated) DEVICE — PAD LAP STERILE 18 X 18 - (5/PK 40PK/CA)

## (undated) DEVICE — GLOVE SZ 6.5 BIOGEL PI MICRO - PF LF (50PR/BX)

## (undated) DEVICE — GLOVE BIOGEL SZ 8 SURGICAL PF LTX - (50PR/BX 4BX/CA)

## (undated) DEVICE — BLADE SURGICAL #15 - (50/BX 3BX/CA)

## (undated) DEVICE — SYRINGE DISP. 60 CC LL - (30/BX, 12BX/CA)**WHEN THESE ARE GONE ORDER #500206**

## (undated) DEVICE — GLOVE SZ 6 BIOGEL PI MICRO - PF LF (50PR/BX 4BX/CA)

## (undated) DEVICE — OBTURATOR BLADELESS STANDARD 8MM (6EA/BX)

## (undated) DEVICE — BLOCK BITE ENDOSCOPIC 2809 - (100/BX) INTERMEDIATE

## (undated) DEVICE — SUTURE 2-0 SILK SH (36PK/BX)

## (undated) DEVICE — SET LEADWIRE 5 LEAD BEDSIDE DISPOSABLE ECG (1SET OF 5/EA)

## (undated) DEVICE — DRESSING TRANSPARENT FILM TEGADERM 4 X 4.75" (50EA/BX)"

## (undated) DEVICE — TUBING CLEARLINK DUO-VENT - C-FLO (48EA/CA)

## (undated) DEVICE — TUBE CONNECT SUCTION CLEAR 120 X 1/4" (50EA/CA)"

## (undated) DEVICE — SLEEVE, VASO, THIGH, MED

## (undated) DEVICE — SUTURE 3-0 VICRYL PLUS REEL 54 (12PK/BX)"

## (undated) DEVICE — SET TUBING PNEUMOCLEAR HIGH FLOW SMOKE EVACUATION (10EA/BX)

## (undated) DEVICE — CANNULA W/ SUPPLY TUBING O2 - (50/CA)

## (undated) DEVICE — TUBE SUCTION YANKAUER  1/4 X 6FT (20EA/CA)"

## (undated) DEVICE — DRAIN PENROSE STERILE 1/4 X - 18 IN  (25EA/BX)

## (undated) DEVICE — STAPLE 75MM LINEAR (12EA/BX)

## (undated) DEVICE — KIT  I.V. START (100EA/CA)

## (undated) DEVICE — FORCEPS MARYLAND BIPOLAR DA VINCI 10X'S REUSABLE

## (undated) DEVICE — SYRINGE SAFETY 10 ML 18 GA X 1 1/2 BLUNT LL (100/BX 4BX/CA)

## (undated) DEVICE — LIGASURE TISSUE FUSION  - SINGLE USE (6/CA)

## (undated) DEVICE — TUBE CONNECTING SUCTION - CLEAR PLASTIC STERILE 72 IN (50EA/CA)

## (undated) DEVICE — GLOVE SZ 7.5 BIOGEL PI MICRO - PF LF (50PR/BX)

## (undated) DEVICE — LACTATED RINGERS INJ 1000 ML - (14EA/CA 60CA/PF)

## (undated) DEVICE — SENSOR OXIMETER ADULT SPO2 RD SET (20EA/BX)

## (undated) DEVICE — DRAPE COLUMN  BOX OF 20

## (undated) DEVICE — CANISTER SUCTION RIGID RED 1500CC (40EA/CA)

## (undated) DEVICE — GLOVE BIOGEL PI INDICATOR SZ 6.5 SURGICAL PF LF - (50/BX 4BX/CA)

## (undated) DEVICE — SENSOR SPO2 NEO LNCS ADHESIVE (20/BX) SEE USER NOTES

## (undated) DEVICE — SUTURE 4-0 MONOCRYL PLUS PS-2 - 27 INCH (36/BX)

## (undated) DEVICE — CANISTER SUCTION 3000ML MECHANICAL FILTER AUTO SHUTOFF MEDI-VAC NONSTERILE LF DISP  (40EA/CA)

## (undated) DEVICE — GLOVE BIOGEL PI INDICATOR SZ 7.5 SURGICAL PF LF -(50/BX 4BX/CA)

## (undated) DEVICE — STAPLER 60MM BLUE 3.5MM WITH - STAPLE (3EA/BX)

## (undated) DEVICE — BITE BLOCK ADULT 60FR (100EA/CA)

## (undated) DEVICE — GLOVE BIOGEL SZ 7.5 SURGICAL PF LTX - (50PR/BX 4BX/CA)

## (undated) DEVICE — GLOVE, LITE (PAIR)

## (undated) DEVICE — STAPLER 75MM LINEAR OPEN (3EA/BX)

## (undated) DEVICE — CANNULA O2 COMFORT SOFT EAR ADULT 7 FT TUBING (50/CA)

## (undated) DEVICE — SYRINGE DISP. 50CC LS - (40/BX)

## (undated) DEVICE — GLOVE BIOGEL SZ 6.5 SURGICAL PF LTX (50PR/BX 4BX/CA)

## (undated) DEVICE — SYRINGE SAFETY 5 ML 18 GA X 1-1/2 BLUNT LL (100/BX 4BX/CA)

## (undated) DEVICE — MEDICINE CUP STERILE 2 OZ - (100/CA)

## (undated) DEVICE — SYRINGE SAFETY 3 ML 18 GA X 1 1/2 BLUNT LL (100/BX 8BX/CA)

## (undated) DEVICE — FORCEP RADIAL JAW 4 STANDARD CAPACITY W/NEEDLE 240CM (40EA/BX)

## (undated) DEVICE — SUTURE 1 PDS PLUS CT-1 36IN (36EA/BX)

## (undated) DEVICE — SPONGE GAUZE NON-STERILE 4X4 - (2000/CA 10PK/CA)

## (undated) DEVICE — KIT CUSTOM PROCEDURE SINGLE FOR ENDO  (15/CA)

## (undated) DEVICE — FILM CASSETTE ENDO

## (undated) DEVICE — DRAPE ARM  BOX OF 20

## (undated) DEVICE — PAD PREP 24 X 48 CUFFED - (100/CA)

## (undated) DEVICE — COVER TIP ENDOWRIST HOT SHEAR - (10EA/BX) DA VINCI

## (undated) DEVICE — SODIUM CHL IRRIGATION 0.9% 1000ML (12EA/CA)

## (undated) DEVICE — GOWN SURGEONS LARGE - (32/CA)

## (undated) DEVICE — DRAPE MAYO STAND - (30/CA)

## (undated) DEVICE — GLOVE BIOGEL PI INDICATOR SZ 7.0 SURGICAL PF LF - (50/BX 4BX/CA)

## (undated) DEVICE — SHEARS MONOPOLAR CURVED  DA VINCI 10X'S REUSABLE

## (undated) DEVICE — SUTURE GENERAL

## (undated) DEVICE — SUTURE2-0 27IN VCRL ANTI VIOL (36PK/BX)

## (undated) DEVICE — SUTURE 0 LIGATING REEL VICRYL PLUS (12PK/BX)

## (undated) DEVICE — WATER IRRIGATION STERILE 1000ML (12EA/CA)

## (undated) DEVICE — STAPLER SKIN DISP - (6/BX 10BX/CA) VISISTAT

## (undated) DEVICE — DRAPE STRLE REG TOWEL 18X24 - (10/BX 4BX/CA)"

## (undated) DEVICE — GRAFT MESH SEPRAFILM - 10/BX

## (undated) DEVICE — SENSOR SPO2 ADULT LNCS ADTX (20/BX) ORDER ITEM #19593

## (undated) DEVICE — NEEDLE INSFL 120MM 14GA VRRS - (20/BX)

## (undated) DEVICE — SEAL 5MM-8MM UNIVERSAL  BOX OF 10